# Patient Record
Sex: FEMALE | Race: WHITE | NOT HISPANIC OR LATINO | Employment: FULL TIME | URBAN - METROPOLITAN AREA
[De-identification: names, ages, dates, MRNs, and addresses within clinical notes are randomized per-mention and may not be internally consistent; named-entity substitution may affect disease eponyms.]

---

## 2017-01-24 ENCOUNTER — OFFICE VISIT (OUTPATIENT)
Dept: MEDICAL GROUP | Facility: MEDICAL CENTER | Age: 32
End: 2017-01-24
Payer: COMMERCIAL

## 2017-01-24 VITALS
WEIGHT: 153.66 LBS | BODY MASS INDEX: 21.51 KG/M2 | HEART RATE: 100 BPM | OXYGEN SATURATION: 98 % | DIASTOLIC BLOOD PRESSURE: 76 MMHG | SYSTOLIC BLOOD PRESSURE: 110 MMHG | HEIGHT: 71 IN | RESPIRATION RATE: 12 BRPM | TEMPERATURE: 98.2 F

## 2017-01-24 DIAGNOSIS — R19.5 CHANGE IN STOOL: ICD-10-CM

## 2017-01-24 PROCEDURE — 99214 OFFICE O/P EST MOD 30 MIN: CPT | Performed by: FAMILY MEDICINE

## 2017-01-24 RX ORDER — LACTOBACILLUS RHAMNOSUS GG 10B CELL
1 CAPSULE ORAL DAILY
Qty: 30 CAP | Refills: 2 | Status: SHIPPED | OUTPATIENT
Start: 2017-01-24 | End: 2018-08-14

## 2017-01-24 NOTE — PROGRESS NOTES
"Subjective:   Amelia Love is a 31 y.o. female here today for   Chief Complaint   Patient presents with   • Stool Color Change       1. Change in stool  This is a new problem. Patient complains of about 3 weeks of change in her stool pattern. She has had yellowish sticky stool. It started with several bouts of diarrhea. Now the stool is more formed and that it does follow to the top of the toilet bowl and his spouse smelling. She sees food particles that are undigested. She denies any abdominal pain, nausea, vomiting. Generally, she has one to 2 bowel movements per day. She does not feel constipated, however, she does have a sensation of incomplete defecation every time she uses the bathroom. She does eat a generally healthy diet. She does not remove her eating anything bad.    Current medicines (including changes today)  Current Outpatient Prescriptions   Medication Sig Dispense Refill   • Lactobacillus-Inulin ([facility] DIGESTIVE [facility]) Cap Take 1 Cap by mouth every day. 30 Cap 2   • levonorgestrel-ethinyl estradiol (AVIANE, ALESSE, LESSINA) 0.1-20 MG-MCG per tablet Take 1 Tab by mouth every day. 84 Tab 4     No current facility-administered medications for this visit.     She  has a past medical history of Seasonal allergic rhinitis (11/15/2013).    ROS   No chest pain, no shortness of breath, no abdominal pain       Objective:     Blood pressure 110/76, pulse 100, temperature 36.8 °C (98.2 °F), resp. rate 12, height 1.803 m (5' 10.98\"), weight 69.7 kg (153 lb 10.6 oz), SpO2 98 %. Body mass index is 21.44 kg/(m^2).   Physical Exam:  Constitutional: Alert, no distress.  Skin: Warm, dry, good turgor, no rashes in visible areas.  Eye: Equal, round and reactive, conjunctiva clear, lids normal.  ENMT: Lips without lesions, good dentition, oropharynx clear.  Neck: Trachea midline, no masses, no thyromegaly. No cervical or supraclavicular lymphadenopathy  Respiratory: Unlabored respiratory effort, lungs clear " to auscultation, no wheezes, no ronchi.  Cardiovascular: Normal S1, S2, no murmur, no edema.  Abdomen: Soft, non-tender, no masses, no hepatosplenomegaly.  Psych: Alert and oriented x3, normal affect and mood.        Assessment and Plan:   The following treatment plan was discussed    1. Change in stool  This is a new problem. Etiology at this point is unclear. I do suspect an acute gastroenteritis that has changed her gut danielito. Other differential diagnoses include celiac disease and irritable bowel, pancreatic insufficiency   Prescription for lactobacillus  Increased fiber intake  Return in 4 weeks or sooner if no improvement  - Lactobacillus-Inulin ([facility] DIGESTIVE [facility]) Cap; Take 1 Cap by mouth every day.  Dispense: 30 Cap; Refill: 2      Followup: Return in about 4 weeks (around 2/21/2017) for stool habits.

## 2017-01-24 NOTE — MR AVS SNAPSHOT
"        Amelia Love   2017 9:40 AM   Office Visit   MRN: [id number]    Department:  [place] [address]   Dept Phone:  [phone]    Description:  Female : 1985   Provider:  Glenny Ni M.D.           Reason for Visit     Stool Color Change           Allergies as of 2017     Allergen Noted Reactions    Minocycline 11/15/2013       Tetracycline 11/15/2013         You were diagnosed with     Change in stool   [[id number]]         Vital Signs     Blood Pressure Pulse Temperature Respirations Height Weight    110/76 mmHg 100 36.8 °C (98.2 °F) 12 1.803 m (5' 10.98\") 69.7 kg (153 lb 10.6 oz)    Body Mass Index Oxygen Saturation Smoking Status             21.44 kg/m2 98% Never Smoker          Basic Information     Date Of Birth Sex Race Ethnicity Preferred Language    1985 Female White Non- English      Your appointments     2017  9:00 AM   Established Patient with Glenny Ni M.D.   [facility])    [address] [facility] [address]  [facility] [id number] [phone]           You will be receiving a confirmation call a few days before your appointment from our automated call confirmation system.            2017 10:00 AM   Established Patient with Glenny Ni M.D.   [facility])    [address]  [facility] [id number] [phone]           You will be receiving a confirmation call a few days before your appointment from our automated call confirmation system.              Problem List              ICD-10-CM Priority Class Noted - Resolved    Abnormal uterine bleeding N93.9   2016 - Present    Multiple nevi D22.9   2016 - Present      Health Maintenance        Date Due Completion Dates    IMM DTaP/Tdap/Td Vaccine (1 - Tdap) 2004 ---    IMM INFLUENZA (1) 2016 ---    PAP SMEAR 2019, 11/15/2013            "   Current Immunizations     No immunizations on file.      Below and/or attached are the medications your provider expects you to take. Review all of your home medications and newly ordered medications with your provider and/or pharmacist. Follow medication instructions as directed by your provider and/or pharmacist. Please keep your medication list with you and share with your provider. Update the information when medications are discontinued, doses are changed, or new medications (including over-the-counter products) are added; and carry medication information at all times in the event of emergency situations     Allergies:  MINOCYCLINE - (reactions not documented)     TETRACYCLINE - (reactions not documented)               Medications  Valid as of: January 24, 2017 - 10:16 AM    Generic Name Brand Name Tablet Size Instructions for use    Lactobacillus-Inulin (Cap) CULTURELLE DIGESTIVE [facility]  Take 1 Cap by mouth every day.        Levonorgestrel-Ethinyl Estrad (Tab) AVIANE, ALEVENKATESH, LESSINA 0.1-20 MG-MCG Take 1 Tab by mouth every day.        .                 Medicines prescribed today were sent to:     [facility][id number] - [facility] [address] [facility]    [address] [address]    Phone: [phone] Fax: [phone]    Open 24 Hours?: No      Medication refill instructions:       If your prescription bottle indicates you have medication refills left, it is not necessary to call your provider’s office. Please contact your pharmacy and they will refill your medication.    If your prescription bottle indicates you do not have any refills left, you may request refills at any time through one of the following ways: The online Tixers system (except Urgent Care), by calling your provider’s office, or by asking your pharmacy to contact your provider’s office with a refill request. Medication refills are processed only during regular business hours and may not be available until the next business day. Your  provider may request additional information or to have a follow-up visit with you prior to refilling your medication.   *Please Note: Medication refills are assigned a new Rx number when refilled electronically. Your pharmacy may indicate that no refills were authorized even though a new prescription for the same medication is available at the pharmacy. Please request the medicine by name with the pharmacy before contacting your provider for a refill.           MyChart Access Code: Activation code not generated  Current Omnil[employer] Systemshart Status: Active

## 2017-08-11 ENCOUNTER — HOSPITAL ENCOUNTER (OUTPATIENT)
Facility: MEDICAL CENTER | Age: 32
End: 2017-08-11
Attending: FAMILY MEDICINE
Payer: COMMERCIAL

## 2017-08-11 ENCOUNTER — OFFICE VISIT (OUTPATIENT)
Dept: MEDICAL GROUP | Facility: LAB | Age: 32
End: 2017-08-11
Payer: COMMERCIAL

## 2017-08-11 VITALS
BODY MASS INDEX: 20.44 KG/M2 | OXYGEN SATURATION: 94 % | TEMPERATURE: 98.2 F | WEIGHT: 146 LBS | HEART RATE: 104 BPM | RESPIRATION RATE: 16 BRPM | SYSTOLIC BLOOD PRESSURE: 120 MMHG | DIASTOLIC BLOOD PRESSURE: 78 MMHG | HEIGHT: 71 IN

## 2017-08-11 DIAGNOSIS — Z01.419 WELL WOMAN EXAM WITH ROUTINE GYNECOLOGICAL EXAM: ICD-10-CM

## 2017-08-11 DIAGNOSIS — Z00.00 HEALTH MAINTENANCE EXAMINATION: ICD-10-CM

## 2017-08-11 DIAGNOSIS — Z30.8 ENCOUNTER FOR OTHER CONTRACEPTIVE MANAGEMENT: ICD-10-CM

## 2017-08-11 DIAGNOSIS — B07.9 VIRAL WARTS, UNSPECIFIED TYPE: ICD-10-CM

## 2017-08-11 DIAGNOSIS — Z11.51 SCREENING FOR HPV (HUMAN PAPILLOMAVIRUS): ICD-10-CM

## 2017-08-11 DIAGNOSIS — Z12.4 SCREENING FOR MALIGNANT NEOPLASM OF CERVIX: ICD-10-CM

## 2017-08-11 DIAGNOSIS — N93.9 ABNORMAL UTERINE BLEEDING: ICD-10-CM

## 2017-08-11 PROCEDURE — 88175 CYTOPATH C/V AUTO FLUID REDO: CPT

## 2017-08-11 PROCEDURE — 99213 OFFICE O/P EST LOW 20 MIN: CPT | Mod: 25 | Performed by: FAMILY MEDICINE

## 2017-08-11 PROCEDURE — 99000 SPECIMEN HANDLING OFFICE-LAB: CPT | Performed by: FAMILY MEDICINE

## 2017-08-11 PROCEDURE — 87624 HPV HI-RISK TYP POOLED RSLT: CPT

## 2017-08-11 PROCEDURE — 17110 DESTRUCTION B9 LES UP TO 14: CPT | Performed by: FAMILY MEDICINE

## 2017-08-11 RX ORDER — LEVONORGESTREL AND ETHINYL ESTRADIOL 0.1-0.02MG
1 KIT ORAL DAILY
Qty: 84 TAB | Refills: 4 | Status: SHIPPED | OUTPATIENT
Start: 2017-08-11 | End: 2017-12-07 | Stop reason: SDUPTHER

## 2017-08-11 ASSESSMENT — PATIENT HEALTH QUESTIONNAIRE - PHQ9: CLINICAL INTERPRETATION OF PHQ2 SCORE: 0

## 2017-08-11 NOTE — PROGRESS NOTES
SUBJECTIVE: 32 y.o.  female for annual routine gynecologic exam    1. Well woman exam with routine gynecological exam  2. Screening for malignant neoplasm of cervix  3. Screening for HPV (human papillomavirus)  4. Health maintenance examination    Obstetric History       T0      TAB0   SAB0   E0   M0   L0       Last Pap: 1 year ago   History   Sexual Activity   • Sexual Activity:   • Partners: Male   • Birth Control/ Protection: Pill     Sexual history: currently sexually active, single partner   H/O Abnormal Pap yes  She  reports that she has never smoked. She has never used smokeless tobacco.        Allergies: Minocycline and Tetracycline     ROS:      PREMENOPAUSAL  Menses every month with 7 days heavy bleeding   Cramping is moderate.       No significant bloating/fluid retention, pelvic pain, or dyspareunia. No vaginal discharge   No breast tenderness, mass, nipple discharge, changes in size or contour, or abnormal cyclic discomfort.  No urinary tract symptoms, no incontinence, no polydipsia, polyuria,  No abdominal pain, change in bowel habits, black or bloody stools.    No unusual headaches, no visual changes, menstrual migraines   No prolonged cough. No dyspnea or chest pain on exertion.  No depression, labile mood, anxiety, libido changes, insomnia.  No temperature intolerance.  No new/concerning skin lesions, concerns.     Exercise: sporadic irregular exercise    5. Abnormal uterine bleeding  This is chronic. Patient reports 1-1/2 years of heavy menstrual bleeding. She is having menses every month now that she is on birth control but there are clots and she is bleeding through tampons. There is no significant pain.    6. Viral warts, unspecified type  Nutrition discussed. Patient has a wart on her left hand. She has been trying over-the-counter medications which have not helped. It is not painful. No discharge.    7. Encounter for other contraceptive management  Currently on combined oral  "contraceptives. No side effects. She continues to have heavy menstrual bleeding.  No family or personal history of clotting disorders, PEs, or DVTs.  Patient does not smoke. She is not sedentary.           Current medicines (including changes today)  Current Outpatient Prescriptions   Medication Sig Dispense Refill   • levonorgestrel-ethinyl estradiol (AVIANE, ALESSE, LESSINA) 0.1-20 MG-MCG per tablet Take 1 Tab by mouth every day. 84 Tab 4   • Lactobacillus-Inulin ([facility] DIGESTIVE [facility]) Cap Take 1 Cap by mouth every day. 30 Cap 2     No current facility-administered medications for this visit.     She  has a past medical history of Seasonal allergic rhinitis (11/15/2013).  She  has past surgical history that includes dental extraction(s).     Family History:   Family History   Problem Relation Age of Onset   • Hypertension Father    • Cancer Maternal Grandmother 50     breast   • Alcohol/Drug Neg Hx    • Diabetes Neg Hx    • Heart Disease Neg Hx    • Psychiatry Neg Hx    • Stroke Neg Hx    • Thyroid Neg Hx    • Allergies Sister    • Hyperlipidemia Mother    • Other Mother 45     MS        OBJECTIVE:   /78 mmHg  Pulse 104  Temp(Src) 36.8 °C (98.2 °F)  Resp 16  Ht 1.803 m (5' 10.98\")  Wt 66.225 kg (146 lb)  BMI 20.37 kg/m2  SpO2 94%  Body mass index is 20.37 kg/(m^2).    HEENT: NC/AT, MMM, oropharynx clear  NECK:  No cervical or supraclavicular JULIETH  NEURO: Cranial nerves II-XII grossly intact  CARDIOVASCULAR:  Regular rate and rhythm.  S1 and S2 normal.  No edema  LUNGS: CTAB  ABDOMEN:  Soft without tenderness, guarding, mass or organomegaly.  No CVA tenderness or inguinal adenopathy.   EXTREMITIES:  peripheral pulses are 2+.   SKIN: color normal, vascularity normal, temperature normal. No rashes or suspicious skin lesions noted.  BREAST: Performed with instruction during examination. No axillary lymphadenopathy, no skin changes, no dominant masses. No nipple retraction  Pelvic Exam -  Normal " external genitalia with no lesions. Normal vaginal mucosa with normal rugation and scant discharge. Cervix with no visible lesions. No cervical motion tenderness. Uterus is normal sized with no masses. No adnexal tenderness or enlargement appreciated. Thin Prep Pap is obtained, vaginal swab is not obtained and specimen(s) sent to lab    <ASSESSMENT and PLAN>  1. Well woman exam with routine gynecological exam  2. Screening for malignant neoplasm of cervix  3. Screening for HPV (human papillomavirus)  Pap, breast exam done today. Healthcare maintenance up-to-date  - THINPREP PAP WITH HPV; Future    4. Health maintenance examination  Labs ordered  - LIPID PROFILE; Future  - COMP METABOLIC PANEL; Future  - CBC WITH DIFFERENTIAL; Future  - TSH WITH REFLEX TO FT4; Future    5. Abnormal uterine bleeding  Chronic, check labs, check transvaginal ultrasound if labs are normal  - TSH WITH REFLEX TO FT4; Future  - US-GYN-PELVIS TRANSVAGINAL; Future    6. Viral warts, unspecified type  CRYOTHERAPY:  Discussed risks and benefits of cryotherapy. Patient verbally agreed. 3 applications of cryotherapy were applied to wart lesion on left hand. Patient tolerated procedure well. Aftercare instructions given.    7. Encounter for other contraceptive management  Discussed OCPs at length. Instructed on importance of taking pill at the same time every day. Use back-up for up to 2 weeks. Discussed risks associated with OCPs including: blood clot, heart attack, and stroke. Advised not to smoke while on hormonal birth control.  - levonorgestrel-ethinyl estradiol (AVIANE, ALESSE, LESSINA) 0.1-20 MG-MCG per tablet; Take 1 Tab by mouth every day.  Dispense: 84 Tab; Refill: 4      Discussed  breast self exam, use and side effects of OCP's, family planning choices, adequate intake of calcium and vitamin D, diet and exercise   Follow-up in 3 years for next Pap if results are normal.   Next office visit for recheck of chronic medical conditions is  due in  1 year

## 2017-08-11 NOTE — MR AVS SNAPSHOT
"        Amelia Love   2017 10:00 AM   Office Visit   MRN: [id number]    Department:  [facility]   Dept Phone:  [phone]    Description:  Female : 1985   Provider:  Glenny Ni M.D.           Reason for Visit     Annual Exam           Allergies as of 2017     Allergen Noted Reactions    Minocycline 11/15/2013       Tetracycline 11/15/2013         You were diagnosed with     Well woman exam with routine gynecological exam   [099043]       Screening for malignant neoplasm of cervix   [113155]       Screening for HPV (human papillomavirus)   [617938]       Health maintenance examination   [583364]       Abnormal uterine bleeding   [472140]       Viral warts, unspecified type   [6087173]       Encounter for other contraceptive management   [0741454]         Vital Signs     Blood Pressure Pulse Temperature Respirations Height Weight    120/78 mmHg 104 36.8 °C (98.2 °F) 16 1.803 m (5' 10.98\") 66.225 kg (146 lb)    Body Mass Index Oxygen Saturation Smoking Status             20.37 kg/m2 94% Never Smoker          Basic Information     Date Of Birth Sex Race Ethnicity Preferred Language    1985 Female White Non- English      Your appointments     Aug 14, 2018  8:20 AM   ANNUAL EXAM PREVENTATIVE with Glenny Ni M.D.   [facility] (--)    [address] [id number] [phone]              Problem List              ICD-10-CM Priority Class Noted - Resolved    Abnormal uterine bleeding N93.9   2016 - Present    Multiple nevi D22.9   2016 - Present      Health Maintenance        Date Due Completion Dates    IMM DTaP/Tdap/Td Vaccine (1 - Tdap) 2004 ---    IMM INFLUENZA (1) 2017 ---    PAP SMEAR 2019, 11/15/2013            Current Immunizations     No immunizations on file.      Below and/or attached are the medications your provider expects you to take. Review all of your " home medications and newly ordered medications with your provider and/or pharmacist. Follow medication instructions as directed by your provider and/or pharmacist. Please keep your medication list with you and share with your provider. Update the information when medications are discontinued, doses are changed, or new medications (including over-the-counter products) are added; and carry medication information at all times in the event of emergency situations     Allergies:  MINOCYCLINE - (reactions not documented)     TETRACYCLINE - (reactions not documented)               Medications  Valid as of: August 11, 2017 - 10:38 AM    Generic Name Brand Name Tablet Size Instructions for use    Lactobacillus-Inulin (Cap) CULTURELLE DIGESTIVE [facility]  Take 1 Cap by mouth every day.        Levonorgestrel-Ethinyl Estrad (Tab) JOB MCBRIDE LESSINA 0.1-20 MG-MCG Take 1 Tab by mouth every day.        .                 Medicines prescribed today were sent to:     [facility]/PHARM[facility]Y #[id number] - [place] - [address]    [address] [facility] [address]    Phone: [phone] Fax: [phone]    Open 24 Hours?: No      Medication refill instructions:       If your prescription bottle indicates you have medication refills left, it is not necessary to call your provider’s office. Please contact your pharmacy and they will refill your medication.    If your prescription bottle indicates you do not have any refills left, you may request refills at any time through one of the following ways: The online [employer]at[employer] system (except Urgent Care), by calling your provider’s office, or by asking your pharmacy to contact your provider’s office with a refill request. Medication refills are processed only during regular business hours and may not be available until the next business day. Your provider may request additional information or to have a follow-up visit with you prior to refilling your medication.   *Please Note: Medication refills are  assigned a new Rx number when refilled electronically. Your pharmacy may indicate that no refills were authorized even though a new prescription for the same medication is available at the pharmacy. Please request the medicine by name with the pharmacy before contacting your provider for a refill.        Your To Do List     Future Labs/Procedures Complete By Expires    CBC WITH DIFFERENTIAL  As directed 8/12/2018    COMP METABOLIC PANEL  As directed 8/12/2018    LIPID PROFILE  As directed 8/12/2018    THINPREP PAP WITH HPV  As directed 8/12/2018    TSH WITH REFLEX TO FT4  As directed 8/11/2018    US-GYN-PELVIS TRANSVAGINAL  As directed 2/11/2018         Remarkhart Access Code: Activation code not generated  Current [email or web address] (D[employer]) Status: Active

## 2017-08-15 ENCOUNTER — HOSPITAL ENCOUNTER (OUTPATIENT)
Dept: LAB | Facility: MEDICAL CENTER | Age: 32
End: 2017-08-15
Attending: FAMILY MEDICINE
Payer: COMMERCIAL

## 2017-08-15 DIAGNOSIS — N93.9 ABNORMAL UTERINE BLEEDING: ICD-10-CM

## 2017-08-15 DIAGNOSIS — E03.8 SUBCLINICAL HYPOTHYROIDISM: ICD-10-CM

## 2017-08-15 DIAGNOSIS — Z00.00 HEALTH MAINTENANCE EXAMINATION: ICD-10-CM

## 2017-08-15 LAB
ALBUMIN SERPL BCP-MCNC: 4.1 G/DL (ref 3.2–4.9)
ALBUMIN/GLOB SERPL: 1.6 G/DL
ALP SERPL-CCNC: 51 U/L (ref 30–99)
ALT SERPL-CCNC: 12 U/L (ref 2–50)
ANION GAP SERPL CALC-SCNC: 7 MMOL/L (ref 0–11.9)
AST SERPL-CCNC: 12 U/L (ref 12–45)
BASOPHILS # BLD AUTO: 0.9 % (ref 0–1.8)
BASOPHILS # BLD: 0.05 K/UL (ref 0–0.12)
BILIRUB SERPL-MCNC: 0.4 MG/DL (ref 0.1–1.5)
BUN SERPL-MCNC: 9 MG/DL (ref 8–22)
CALCIUM SERPL-MCNC: 9.1 MG/DL (ref 8.5–10.5)
CHLORIDE SERPL-SCNC: 107 MMOL/L (ref 96–112)
CHOLEST SERPL-MCNC: 164 MG/DL (ref 100–199)
CO2 SERPL-SCNC: 26 MMOL/L (ref 20–33)
CREAT SERPL-MCNC: 0.78 MG/DL (ref 0.5–1.4)
CYTOLOGY REG CYTOL: NORMAL
EOSINOPHIL # BLD AUTO: 0.13 K/UL (ref 0–0.51)
EOSINOPHIL NFR BLD: 2.3 % (ref 0–6.9)
ERYTHROCYTE [DISTWIDTH] IN BLOOD BY AUTOMATED COUNT: 38.2 FL (ref 35.9–50)
GFR SERPL CREATININE-BSD FRML MDRD: >60 ML/MIN/1.73 M 2
GLOBULIN SER CALC-MCNC: 2.6 G/DL (ref 1.9–3.5)
GLUCOSE SERPL-MCNC: 83 MG/DL (ref 65–99)
HCT VFR BLD AUTO: 43.5 % (ref 37–47)
HDLC SERPL-MCNC: 52 MG/DL
HGB BLD-MCNC: 14.7 G/DL (ref 12–16)
HPV HR 12 DNA CVX QL NAA+PROBE: NEGATIVE
HPV16 DNA SPEC QL NAA+PROBE: NEGATIVE
HPV18 DNA SPEC QL NAA+PROBE: NEGATIVE
IMM GRANULOCYTES # BLD AUTO: 0.01 K/UL (ref 0–0.11)
IMM GRANULOCYTES NFR BLD AUTO: 0.2 % (ref 0–0.9)
LDLC SERPL CALC-MCNC: 94 MG/DL
LYMPHOCYTES # BLD AUTO: 1.53 K/UL (ref 1–4.8)
LYMPHOCYTES NFR BLD: 27 % (ref 22–41)
MCH RBC QN AUTO: 30.5 PG (ref 27–33)
MCHC RBC AUTO-ENTMCNC: 33.8 G/DL (ref 33.6–35)
MCV RBC AUTO: 90.2 FL (ref 81.4–97.8)
MONOCYTES # BLD AUTO: 0.35 K/UL (ref 0–0.85)
MONOCYTES NFR BLD AUTO: 6.2 % (ref 0–13.4)
NEUTROPHILS # BLD AUTO: 3.59 K/UL (ref 2–7.15)
NEUTROPHILS NFR BLD: 63.4 % (ref 44–72)
NRBC # BLD AUTO: 0 K/UL
NRBC BLD AUTO-RTO: 0 /100 WBC
PLATELET # BLD AUTO: 170 K/UL (ref 164–446)
PMV BLD AUTO: 13 FL (ref 9–12.9)
POTASSIUM SERPL-SCNC: 3.9 MMOL/L (ref 3.6–5.5)
PROT SERPL-MCNC: 6.7 G/DL (ref 6–8.2)
RBC # BLD AUTO: 4.82 M/UL (ref 4.2–5.4)
SODIUM SERPL-SCNC: 140 MMOL/L (ref 135–145)
SPECIMEN SOURCE: NORMAL
T4 FREE SERPL-MCNC: 1.09 NG/DL (ref 0.53–1.43)
TRIGL SERPL-MCNC: 89 MG/DL (ref 0–149)
TSH SERPL DL<=0.005 MIU/L-ACNC: 3.97 UIU/ML (ref 0.3–3.7)
WBC # BLD AUTO: 5.7 K/UL (ref 4.8–10.8)

## 2017-08-15 PROCEDURE — 85025 COMPLETE CBC W/AUTO DIFF WBC: CPT

## 2017-08-15 PROCEDURE — 80061 LIPID PANEL: CPT

## 2017-08-15 PROCEDURE — 80053 COMPREHEN METABOLIC PANEL: CPT

## 2017-08-15 PROCEDURE — 84439 ASSAY OF FREE THYROXINE: CPT

## 2017-08-15 PROCEDURE — 36415 COLL VENOUS BLD VENIPUNCTURE: CPT

## 2017-08-15 PROCEDURE — 84443 ASSAY THYROID STIM HORMONE: CPT

## 2017-08-15 NOTE — PROGRESS NOTES
Quick Note:    [email or web address] message sent to the patient    Glenny Ni M.D.    Subclinical hypothyroid    ______

## 2017-12-05 ENCOUNTER — APPOINTMENT (RX ONLY)
Dept: URBAN - METROPOLITAN AREA CLINIC 31 | Facility: CLINIC | Age: 32
Setting detail: DERMATOLOGY
End: 2017-12-05

## 2017-12-05 DIAGNOSIS — L82.1 OTHER SEBORRHEIC KERATOSIS: ICD-10-CM

## 2017-12-05 DIAGNOSIS — L91.0 HYPERTROPHIC SCAR: ICD-10-CM

## 2017-12-05 DIAGNOSIS — D18.0 HEMANGIOMA: ICD-10-CM

## 2017-12-05 DIAGNOSIS — D22 MELANOCYTIC NEVI: ICD-10-CM

## 2017-12-05 DIAGNOSIS — L81.4 OTHER MELANIN HYPERPIGMENTATION: ICD-10-CM

## 2017-12-05 DIAGNOSIS — L56.5 DISSEMINATED SUPERFICIAL ACTINIC POROKERATOSIS (DSAP): ICD-10-CM

## 2017-12-05 DIAGNOSIS — Z71.89 OTHER SPECIFIED COUNSELING: ICD-10-CM

## 2017-12-05 PROBLEM — D18.01 HEMANGIOMA OF SKIN AND SUBCUTANEOUS TISSUE: Status: ACTIVE | Noted: 2017-12-05

## 2017-12-05 PROBLEM — D22.72 MELANOCYTIC NEVI OF LEFT LOWER LIMB, INCLUDING HIP: Status: ACTIVE | Noted: 2017-12-05

## 2017-12-05 PROBLEM — Q82.8 OTHER SPECIFIED CONGENITAL MALFORMATIONS OF SKIN: Status: ACTIVE | Noted: 2017-12-05

## 2017-12-05 PROBLEM — D22.9 MELANOCYTIC NEVI, UNSPECIFIED: Status: ACTIVE | Noted: 2017-12-05

## 2017-12-05 PROBLEM — D22.5 MELANOCYTIC NEVI OF TRUNK: Status: ACTIVE | Noted: 2017-12-05

## 2017-12-05 PROCEDURE — 99395 PREV VISIT EST AGE 18-39: CPT

## 2017-12-05 PROCEDURE — ? COUNSELING

## 2017-12-05 PROCEDURE — ? OBSERVATION AND MEASURE

## 2017-12-05 PROCEDURE — ? ADDITIONAL NOTES

## 2017-12-05 PROCEDURE — ? PRESCRIPTION

## 2017-12-05 RX ORDER — POLYDIMETHYLSILOXANES/SILICON
GEL (GRAM) TOPICAL
Qty: 1 | Refills: 1 | Status: ERX

## 2017-12-05 ASSESSMENT — LOCATION ZONE DERM
LOCATION ZONE: LEG
LOCATION ZONE: FACE
LOCATION ZONE: TRUNK
LOCATION ZONE: TOE

## 2017-12-05 ASSESSMENT — LOCATION SIMPLE DESCRIPTION DERM
LOCATION SIMPLE: LEFT GREAT TOE
LOCATION SIMPLE: LEFT UPPER BACK
LOCATION SIMPLE: RIGHT UPPER BACK
LOCATION SIMPLE: RIGHT THIGH
LOCATION SIMPLE: ABDOMEN
LOCATION SIMPLE: LEFT THIGH
LOCATION SIMPLE: RIGHT PRETIBIAL REGION
LOCATION SIMPLE: RIGHT FOREHEAD
LOCATION SIMPLE: LEFT 4TH TOE

## 2017-12-05 ASSESSMENT — LOCATION DETAILED DESCRIPTION DERM
LOCATION DETAILED: RIGHT ANTERIOR PROXIMAL THIGH
LOCATION DETAILED: LEFT ANTERIOR PROXIMAL THIGH
LOCATION DETAILED: LEFT DORSAL 4TH TOE
LOCATION DETAILED: RIGHT SUPERIOR LATERAL FOREHEAD
LOCATION DETAILED: RIGHT DISTAL PRETIBIAL REGION
LOCATION DETAILED: RIGHT SUPERIOR MEDIAL UPPER BACK
LOCATION DETAILED: EPIGASTRIC SKIN
LOCATION DETAILED: LEFT MID-UPPER BACK
LOCATION DETAILED: RIGHT MEDIAL UPPER BACK
LOCATION DETAILED: LEFT DORSAL GREAT TOE

## 2017-12-05 NOTE — HPI: SCAR
Is This A New Presentation, Or A Follow-Up?: Scars
Additional History: Pt had biopsies last year and formed thicker scars, per pt tried otc topical and pads with no difference.  The scars are not tender or itching, just raised.

## 2017-12-05 NOTE — PROCEDURE: OBSERVATION
Size Of Lesion: 9 mm
Detail Level: Detailed
Size Of Lesion: 6 mm
Size Of Lesion: 3 mm
Size Of Lesion: 4 mm

## 2017-12-07 DIAGNOSIS — Z30.8 ENCOUNTER FOR OTHER CONTRACEPTIVE MANAGEMENT: ICD-10-CM

## 2017-12-07 RX ORDER — LEVONORGESTREL AND ETHINYL ESTRADIOL 0.1-0.02MG
1 KIT ORAL DAILY
Qty: 84 TAB | Refills: 4 | Status: SHIPPED | OUTPATIENT
Start: 2017-12-07 | End: 2018-01-04 | Stop reason: SDUPTHER

## 2018-01-04 DIAGNOSIS — Z30.8 ENCOUNTER FOR OTHER CONTRACEPTIVE MANAGEMENT: ICD-10-CM

## 2018-01-04 RX ORDER — LEVONORGESTREL AND ETHINYL ESTRADIOL 0.1-0.02MG
1 KIT ORAL DAILY
Qty: 84 TAB | Refills: 4 | Status: SHIPPED | OUTPATIENT
Start: 2018-01-04 | End: 2018-03-14

## 2018-02-05 ENCOUNTER — SLEEP CENTER VISIT (OUTPATIENT)
Dept: SLEEP MEDICINE | Facility: MEDICAL CENTER | Age: 33
End: 2018-02-05
Payer: COMMERCIAL

## 2018-02-05 VITALS
OXYGEN SATURATION: 96 % | WEIGHT: 160 LBS | SYSTOLIC BLOOD PRESSURE: 130 MMHG | BODY MASS INDEX: 22.9 KG/M2 | RESPIRATION RATE: 16 BRPM | HEART RATE: 70 BPM | HEIGHT: 70 IN | DIASTOLIC BLOOD PRESSURE: 80 MMHG

## 2018-02-05 DIAGNOSIS — G47.30 SLEEP DISORDER BREATHING: ICD-10-CM

## 2018-02-05 PROCEDURE — 99203 OFFICE O/P NEW LOW 30 MIN: CPT | Performed by: FAMILY MEDICINE

## 2018-02-05 RX ORDER — POLYDIMETHYLSILOXANES/SILICON
GEL (GRAM) TOPICAL
COMMUNITY
Start: 2017-12-06 | End: 2018-03-14

## 2018-02-05 NOTE — PROGRESS NOTES
"     [facility]  Consult Note     Date: 2/5/2018 / Time: 11:18 AM    Patient ID:   Name:             Amelia Love   YOB: 1985  Age:                 32 y.o.  female   MRN:               [id number]      Thank you for requesting a sleep medicine consultation on Amelia Love at the sleep center. she presents today with the chief complaints of snoring, gasping/choking occasional excessive daytime sleepiness (EDS). She is slef referred.     HISTORY OF PRESENT ILLNESS:       At night,  Amelia Love goes to bed around 8:30 pm on weekdays and around 10 pm on weekends. She gets out of bed at 4:30 on weekdays and at 8 am on weekends. She  averages 8 hrs of sleep on a good night Pt has bad nights rarely. She falls asleep within few minutes. She awakens occasionally. It takes her few min to fall back asleep.    She is aware of snoring/witnessed apneas/gasping or choking in sleep.  She  denies any symptoms of restless legs syndrome such as an \"urge to move\"  She  legs in the evening or bedtime. She  denies any symptoms of narcolepsy such as sleep paralysis or cataplexy, or any symptoms to suggest parasomnias such as sleep walking or acting out of dreams. She  has not used any medications for her sleep problem.    Overall,  She doesnot finds her sleep refreshing. When She  wakes up in the morning, She  does does feel tired. In terms of  excessive daytime sleepiness,  She complains of sleepiness while  at work, while reading or watching TV and occasionally while driving. She does take regular naps on the weekends. The naps are usually 1-2 min long.      REVIEW OF SYSTEMS:       Constitutional: Denies fevers, Denies weight changes  Eyes: Denies changes in vision, no eye pain  Ears/Nose/Throat/Mouth: Denies nasal congestion or sore throat   Cardiovascular: Denies chest pain or palpitations   Respiratory: Denies shortness of breath , Denies cough  Gastrointestinal/Hepatic: Denies abdominal " "pain, nausea, vomiting, diarrhea, constipation or GI bleeding   Genitourinary: Denies bladder dysfunction, dysuria or frequency  Musculoskeletal/Rheum: Denies  joint pain and swelling   Skin/Breast: Denies rash, denies breast lumps or discharge  Neurological: Denies headache, confusion, memory loss or focal weakness/parasthesias  Psychiatric: denies mood disorder     Comprehensive review of systems form is reviewed with the patient and is attached in the EMR.     PMH:  has a past medical history of Seasonal allergic rhinitis (11/15/2013).  MEDS:   Current Outpatient Prescriptions:   •  Scar Treatment Products (RECEDO) Gel, , Disp: , Rfl:   •  SRONYX 0.1-20 MG-MCG per tablet, TAKE 1 TAB BY MOUTH EVERY DAY., Disp: 84 Tab, Rfl: 4  •  Lactobacillus-Inulin ([employer] DIGESTIVE HEALTH) Cap, Take 1 Cap by mouth every day., Disp: 30 Cap, Rfl: 2  ALLERGIES:   Allergies   Allergen Reactions   • Minocycline    • Tetracycline      SURGHX:   Past Surgical History:   Procedure Laterality Date   • DENTAL EXTRACTION(S)       SOCHX:  reports that she has never smoked. She has never used smokeless tobacco. She reports that she drinks about 0.5 oz of alcohol per week . She reports that she does not use drugs.   FH:   Family History   Problem Relation Age of Onset   • Hypertension Father    • Hyperlipidemia Mother    • Other Mother 45     MS    • Cancer Maternal Grandmother 50     breast   • Allergies Sister    • Alcohol/Drug Neg Hx    • Diabetes Neg Hx    • Heart Disease Neg Hx    • Psychiatry Neg Hx    • Thyroid Neg Hx            Physical Exam:  Vitals/ General Appearance:   Weight/BMI: Body mass index is 22.96 kg/m².  Blood pressure 130/80, pulse 70, resp. rate 16, height 1.778 m (5' 10\"), weight 72.6 kg (160 lb), SpO2 96 %.  Vitals:    02/05/18 1056   BP: 130/80   Pulse: 70   Resp: 16   SpO2: 96%   Weight: 72.6 kg (160 lb)   Height: 1.778 m (5' 10\")       Pt. is alert and oriented to time, place and person. Cooperative and in no " apparent distress.       1. Head: Atraumatic, normocephalic. There is no mandibular hypoplasia or maxillary over-jut.   2. Ears: Normal tympanic membrane and no discharge  3. Nose: No inferior turbinate hypertophy, no septal deviation, no polyp.   4. Throat: Oropharynx appears crowded in that the palate is overhanging (Malam Destinee scale 4. Tonsils are not enlarged, uvula is large, pharynx not inflamed. Tongue is large. She has intact dentition and oral hygiene is fair.  5. Neck: Supple. No thyromegaly  6. Chest: Trachea central, no spine deformity (Scoliosis/Lordosis/Kyphosis)  7. Lungs auscultation: B/L good air entry, vesicular breath sounds, no adventitious sounds  8. Heart auscultation: 1st and 2nd heart sounds normal, regular rhythm. No appreciable murmur.  9. Abdomen: Soft, non tender, no organomegaly. Bowel sounds present  10. Extremities: No, no deformity, no clubbing, no pedal edema.   Peripheral pulses felt.  11. Skin: No rash  12. NEUROLOGICAL EXAMINATION: On neurological exam, the patient was alert and oriented x3. speech was clear and fluent without dysarthria.  Cranial nerve exam II through XII was normal. Motor exam revealed normal bulk, tone and strength 5/5, which was symmetrical in the upper and lower extremities bilaterally.  Deep tendon reflexes are 2+ and symmetric throughout.    INVESTIGATIONS:       ASSESSMENT AND PLAN     1. She  has symptoms of Obstructive Sleep Apnea (ENRRIQUE). She  has excessive daytime sleepiness that interferes with activites of daily living. She  risk factors for ENRRIQUE include crowded oropharynx.     The pathophysiology of ENRRIQUE and the increased risk of cardiovascular morbidity from untreated ENRRIQUE is discussed in detail with the patient.    We have discussed diagnostic options including in-laboratory, attended polysomnography and home sleep testing. We have also discussed treatment options including airway pressurization, reconstructive otolaryngologic surgery, dental  appliances and weight management.     She  will be scheduled for an overnight PSG to assess sleep related breathing disorder.     Subsequently,treatment options will be discussed based on the diagnostic study. Meanwhile, She is urged to avoid supine sleep, weight gain and alcoholic beverages since all of these can worsen ENRRIQUE. She is cautioned against drowsy driving. If She feels sleepy while driving, She must pull over for a break/nap, rather than persist on the road, in the interest of She own safety and that of others on the road.    2.  Regarding treatment of other past medical problems and general health maintenance,  She is urged to follow up with PCP.        Answers for HPI/ROS submitted by the patient on 1/22/2018   Year of your last physical exam: 2017  Results of exam: underactive thyroid  Occupation :   Height: 5'11''  Current weight: 155  6 months ago: 153  At age 20: 120  What is the reason for your visit today?: Snoring, possible sleep apnea?  Name of person referring you to the Sleep Center: No referral; but I discussed my visit with my primary care physician, Rupali Calvin  Have you ever been hospitalized?: No  Have you ever had problems with anesthesia?: No  Have you experienced post-operative delirium?: No  Any complications with surgery?: No  What year did you receive your last Flu shot?: None  What year did you receive you last Pneumonia shot?: None  Have you had a TB skin test? If so, please list the year and result:: No  Have you had Allergy skin testing? If so, please list the year and result:: Yes, 2014, negative  Please briefly describe your sleep problem and how old you were when it began.: I snore off and on throughout the night, sometimes very loudly. Sometimes I wake myself up gasping for air (possible apnea?). I have had these symptoms the last 2-3 years, but it has gotten worse the past 1 year to 6 months or so.  How does this affect your daily life and  activities? Please also rate how serious of a problem this is (1 = Not at all, 10 = Very Serious).: 4  Have you had any previous evaluations, examinations, or treatment for this sleep problem or any other problems with your sleep? If so, please describe the evaluation, treatment, and results.: No  Have you used any medications (prescribed or otherwise) to help your sleep problem? If yes, include name, amount, frequency, and the prescribing physician.: No  If employed, what time do you usually start and end work?: 6am-5pm  Do you ever change work shifts? If yes, describe how often (never, infrequently, regularly).: no  What time do you usually go to bed and wake up on: Weekdays? Weekends?: Weekdays: 8:30pm; Weekends: 10pm  Do you have a regular bed partner?: Yes  How many minutes does it usually take to fall asleep at night after turning off the lights?: Immediately; never any issues with falling alseep  What do you ordinarily do just prior to turning out the lights and attempting to go to sleep (e.g., reading, TV, baths, etc.)?: Usually tv/ipad and then brush teeth, wash face, etc.  On average, how many times do you wake up during the night?: 1 to 2; sometimes never that I know of  On average, how many times do you wake up to use the bathroom?: Occassionally I will get up before my alarm to use the bathroom, but it does not happen everynight. Maybe once a week at most.  Do you often wake up too early in the morning and are unable to return to sleep?: No  On average, how many hours of sleep do you get per night?: 8 to 10, sometimes 11 to 12 on weekends  How do you usually awaken?  Alarm, spontaneously, or other?: Alarm and/or my cat  Is it difficult for you to awaken and get out of bed after sleeping? (Not at all, Sometimes, Very): Sometimes  Do you nap or return to bed after arising?: Sometimes  Are you bothered by sleepiness during the day?: Yes  Do you feel that you get too much sleep at night?: No, but other  "people tell me I \"sleep too much\"  Do you feel that you get too little sleep at night?: Sometimes  Do you usually feel tired during the day? If so, what do you attribute this to?: Yes. I have always had low energy as long as I can remember  Do you find yourself falling asleep when you don't mean to? : Yes  If yes, how long does your sleep episode last?: Sometimes I just need 15-30 minute rest and that helps with my energy level  Do you feel rested or refreshed after the sleep episode?: Yes  Have you ever suddenly fallen?: No  Have you ever experienced sudden body weakness?: No  Have you ever experienced weakness or paralysis upon going to sleep?: Yes  Have you ever experienced weakness or paralysis upon awakening from sleep?: Yes  If yes for either of the above two questions, please indicate how many times per week does this occur?: Occassionally, I have not kept track. But I do recall sometimes not being able to move as in sleep but that my mind is somewhat awake as I fall in/out of sleep  Have you ever experienced seeing things or hearing voices/noises: That weren't real? On going to sleep? During the night? On awakening from sleep? During the day?: Yes. On going to sleep sounds are louder than they seem. For example, the other night I heard a loud bang as I was falling asleep and I lept out of bed but I could not find the source of the sound. This same occurence has happened to me multiple times.  Do you have difficulty breathing at night? If yes, briefly describe.: Yes; I sometimes wake up gasping for air  How many times per week?: I have not kept track, it seems to happen at least once a week  How did you become aware of this, at what age did this first occur, and how many years has this occurred?: I first remember this sensation a few years ago; probably 2013 or 2014?  Have you been told you snore while asleep? If so, does it disturb a bed partner (or someone in the same room), or someone in the next room?: " "Yes; yes it disturbs him significantly  Have you ever experienced doing something without being aware of the action? If yes, please describe.: I have been told by my partner I sometimes stick my arm straight up in the air when I am sleeping; I have no recollection of this.  How many times per week does this occur?: I'm not sure; occassionally  Have you ever experienced upon lying in bed before sleep or on awakening from sleep: Restlessness of legs, \"nervous legs\", \"creeping crawling\" sensation of legs, or twitching of legs?: No. But sometimes my extremeties, sometimes a foot or mostly my hands\"fall alseep\" because I am cutting of circulation due to sleep positions and I am jolted awake by the tingly sensation and try to get feeling back in them.  How many times per week does this occur, and how many minutes does the sensation last?: 2-3; 1-2 minutes or so until the blood returns to my extremeties  Does anything relieve the sensations (e.g., getting out of bed, medication, massage)?: Yes  At what age did this first occur, and how many years has this occurred?: I'm not sure, possibly the last 2 years?  Have you ever been told that your arms or legs jerk or twitch while you are asleep? If yes, how many times per night does this occur?: No  Do you know, or have you ever been told that you do any of the following while sleeping: talk, walk, grit teeth, wet the bed, wake up screaming or seemingly afraid, have disturbing dreams, have unusual movements, wake up with headaches, (males) have erections? If yes to any of these, please indicate how many times per week, age started, last occurrence, treatment received.: Yes, unusual movements such as placing my arm in the air; on occassion. The last time this happened was maybe 2 weeks ago.  Has anyone in your family been known to have any sleep problems? If yes, please list the type of problem (e.g., trouble getting to sleep, too sleepy, bed wetting, etc.), the relationship of " this person to you, and the treatment received.: No

## 2018-03-03 ENCOUNTER — SLEEP STUDY (OUTPATIENT)
Dept: SLEEP MEDICINE | Facility: MEDICAL CENTER | Age: 33
End: 2018-03-03
Attending: FAMILY MEDICINE
Payer: COMMERCIAL

## 2018-03-03 DIAGNOSIS — G47.30 SLEEP DISORDER BREATHING: ICD-10-CM

## 2018-03-03 PROCEDURE — 95810 POLYSOM 6/> YRS 4/> PARAM: CPT | Performed by: FAMILY MEDICINE

## 2018-03-05 NOTE — PROCEDURES
Clinical Comments:  The patient underwent a comprehensive polysomnogram using the standard montage for measurement of parameters of sleep, respiratory events, movement abnormalities, heart rate and rhythm. A microphone was used to monitor snoring.      INTERPRETATION:  The study was started at 9:14 PM.  The total recording time was 525.1 minutes with a sleep period of 511.1 minutes and the total sleep time was 384.5 minutes with a sleep efficiency of 73.2%.  The sleep latency was 14.0 minutes, and REM latency was 173.5 minutes.  The patient experienced 97 arousals in total, for an arousal index of 15.1    RESPIRATORY: The patient had 5 apneas in total.  Of these, 3 were obstructive apneas, and 2 were central apneas.  This resulted in an apnea index (AI) of 0.8.  The patient had 36 hypopneas, for a hypopnea index of 5.6.  The overall AHI was 6.4, while the AHI during Stage R sleep was 7.7.  AHI while supine was 12.1.    OXIMETRY: Oxygen saturation monitoring showed a mean SpO2 of 95.0%, with a minimum oxygen saturation of 91.0%.  Oxygen saturations were less than or = 89% for 0.0 minutes of sleep time.    CARDIAC: The highest heart rate during the recording was 110.0 beats per minute.  The average heart rate during sleep was 64.7 bpm.    LIMB MOVEMENTS: There were a total of 19 PLMs during sleep, of which 3 were PLMs arousals.  This resulted in a PLMS index of 3.0.    Technical summary:The patient underwent a diagnostic polysomnogram. This was a 16 channel montage study to include a 6 channel EEG, a 2 channel EOG, and chin EMG, left and right leg EMG, a snore channel, a nasal pressure transducer, and a nasal oral airflow semester.   Respiratory effort was assessed with the use of a thoracic and abdominal monitor and overnight oximetry was obtained. Audio and video recordings were reviewed. This was a fully attended study and sleep stage scoring was performed. The test was technically adequate.     General sleep  summary:  General sleep summary:  During the overnight study, the sleep latency was 14 min which is normal. The REM latency was 93 which is increased. The total sleep time was 384 min and sleep efficiency was 73% which is decreased.  Sleep stage proportions showed decreased REM, increased N# and increased WASO of 126 min.  In regards to sleep quality there was a mild degree of sleep fragmentation as shown by the arousal index of 15 an hour which is increased. The arousals were due to spontaneous arousal.    Respiratory summary: During the overnight study, the patient demonstrated mild obstructive sleep apnea as shown by the apnea hypopnea index of 6.4/hr. There was a total of 5 apneas and 36 hypopneas. In the supine position the respiratory disturbance index was 12.6 an hour and in the non-supine position the respiratory disturbance index was 4.3 per hour. The respiratory events were associated with O2 brad of 91% and averahe O2 saturation of 95%. There was mild snoring. The patient demonstrated a NSR with an average heartbeat of 65 bpm.     Periodic limb movement summary: The patient demonstrated an normal degree of periodic limb movements as shown by the PLM index of 0.2 per hour.      Impression:  1.  Mild obstructive sleep apnea      Recommendations:  Recommend the patient return for a CPAP titration vs Auto CPAP trial. In some cases alternative treatment options may prove effective in resolving sleep apnea and these options include upper airway surgery, the use of a dental orthotic or weight loss and positional therapy. Clinical correlation is required. In general patients with sleep apnea are advised to avoid alcohol and sedatives and to not operate a motor vehicle while drowsy and are at a greater risk for cardiovascular disease.

## 2018-03-14 ENCOUNTER — OFFICE VISIT (OUTPATIENT)
Dept: MEDICAL GROUP | Facility: PHYSICIAN GROUP | Age: 33
End: 2018-03-14
Payer: COMMERCIAL

## 2018-03-14 VITALS
TEMPERATURE: 98.6 F | BODY MASS INDEX: 22.62 KG/M2 | HEART RATE: 82 BPM | RESPIRATION RATE: 16 BRPM | HEIGHT: 70 IN | WEIGHT: 158 LBS | SYSTOLIC BLOOD PRESSURE: 118 MMHG | OXYGEN SATURATION: 97 % | DIASTOLIC BLOOD PRESSURE: 60 MMHG

## 2018-03-14 DIAGNOSIS — J01.40 ACUTE NON-RECURRENT PANSINUSITIS: ICD-10-CM

## 2018-03-14 PROCEDURE — 99214 OFFICE O/P EST MOD 30 MIN: CPT | Performed by: FAMILY MEDICINE

## 2018-03-14 RX ORDER — AZITHROMYCIN 250 MG/1
TABLET, FILM COATED ORAL
Qty: 6 TAB | Refills: 0 | Status: SHIPPED | OUTPATIENT
Start: 2018-03-14 | End: 2018-08-14

## 2018-03-14 ASSESSMENT — PAIN SCALES - GENERAL: PAINLEVEL: NO PAIN

## 2018-03-14 NOTE — PATIENT INSTRUCTIONS
Sinus Rinse  WHAT IS A SINUS RINSE?  A sinus rinse is a home treatment. It rinses your sinuses with a mixture of salt and water (saline solution). Sinuses are air-filled spaces in your skull behind the bones of your face and forehead. They open into your nasal cavity.  To do a sinus rinse, you will need:  · Saline solution.  · Neti pot or spray bottle. This releases the saline solution into your nose and through your sinuses. You can buy neti pots and spray bottles at:  ¨ Your local pharmacy.  ¨ A [employer] food store.  ¨ Online.  WHEN WOULD I DO A SINUS RINSE?  A sinus rinse can help to clear your nasal cavity. It can clear:  · Mucus.  · Dirt.  · Dust.  · Pollen.  You may do a sinus rinse when you have:  · A cold.  · A virus.  · Allergies.  · A sinus infection.  · A stuffy nose.  If you are considering a sinus rinse:  · Ask your child's doctor before doing a sinus rinse on your child.  · Do not do a sinus rinse if you have had:  ¨ Ear or nasal surgery.  ¨ An ear infection.  ¨ Blocked ears.  HOW DO I DO A SINUS RINSE?  · Wash your hands.  · Disinfect your device using the directions that came with the device.  · Dry your device.  · Use the solution that comes with your device or one that is sold separately in stores. Follow the mixing directions on the package.  · Fill your device with the amount of saline solution as stated in the device instructions.  · Stand over a sink and tilt your head sideways over the sink.  · Place the spout of the device in your upper nostril (the one closer to the ceiling).  · Gently pour or squeeze the saline solution into the nasal cavity. The liquid should drain to the lower nostril if you are not too congested.  · Gently blow your nose. Blowing too hard may cause ear pain.  · Repeat in the other nostril.  · Clean and rinse your device with clean water.  · Air-dry your device.  ARE THERE RISKS OF A SINUS RINSE?  Sinus rinse is normally very safe and helpful. However, there are a few  risks, which include:  · A burning feeling in the sinuses. This may happen if you do not make the saline solution as instructed. Make sure to follow all directions when making the saline solution.  · Infection from unclean water. This is rare, but possible.  · Nasal irritation.  This information is not intended to replace advice given to you by your health care provider. Make sure you discuss any questions you have with your health care provider.  Document Released: 07/15/2015 Document Revised: 08/13/2017 Document Reviewed: 05/05/2015  Elsevier Interactive Patient Education © 2017 [employer]

## 2018-03-14 NOTE — PROGRESS NOTES
"Chief Complaint   Patient presents with   • Sinusitis     poss sinus infection        HPI: 10+ days of illness including: nasal congestion, rhinorrhea, facial pain, ear pain. Mucus is: green.  Symptoms negative for fever, chills, chest pain, dyspnea, wheezing.   Similarly ill exposures: yes.  Medical history negative for recurrent OM, tonsillitis, sinusitis, asthma, bronchitis, pneumonia.   She  reports that she has never smoked. She has never used smokeless tobacco.     Patient made her appointment today, because she will be traveling to [place] for a couple of weeks to visit her sister. She wanted to make sure that she was well enough to travel.    ROS:  No fever, cough, nausea, changes in bowel movements or skin rash.    I reviewed the patient's medical history:  Past Medical History:   Diagnosis Date   • Seasonal allergic rhinitis 11/15/2013       EXAM:  Blood pressure 118/60, pulse 82, temperature 37 °C (98.6 °F), resp. rate 16, height 1.778 m (5' 10\"), weight 71.7 kg (158 lb), last menstrual period 03/11/2018, SpO2 97 %, not currently breastfeeding.  General: Alert, no conversational dyspnea or audible wheeze, non-toxic appearance.  Eyes: PERRL, conjunctiva slightly injected, no eye discharge.  Ears: Normal pinnae,TM's normal bilaterally.  Nares: Patent with thin mucus.  Sinuses: Non tender over maxillary and frontal sinuses.  Throat: Erythematous injection without exudate.   Neck: Supple, with shotty anterior cervical lymphadenopathy.  Lungs: Clear to auscultation bilaterally, no wheeze, crackles or rhonchi.   Heart: Regular rate without murmur.  Skin: Warm and dry without rash.    ASSESSMENT: Acute pansinusitis    PLAN:  1. Educated patient that majority of sinus infections are viral and do not need antibiotics. Provided her with a prescription for azithromycin to take with her as she will be traveling out of the country. Patient reports she has taken azithromycin in the past for sinus infections.  2. Twice " daily use of nasal saline rinse or Neti-Pot.  3. OTC anti-pyretics and decongestants as needed.  4. Follow-up for worsening symptoms, difficulty breathing, lack of expected recovery, or should new symptoms or problems arise.

## 2018-05-02 ENCOUNTER — SLEEP CENTER VISIT (OUTPATIENT)
Dept: SLEEP MEDICINE | Facility: MEDICAL CENTER | Age: 33
End: 2018-05-02
Payer: COMMERCIAL

## 2018-05-02 VITALS
RESPIRATION RATE: 15 BRPM | SYSTOLIC BLOOD PRESSURE: 118 MMHG | BODY MASS INDEX: 22.62 KG/M2 | OXYGEN SATURATION: 96 % | WEIGHT: 158 LBS | DIASTOLIC BLOOD PRESSURE: 82 MMHG | HEART RATE: 80 BPM | HEIGHT: 70 IN

## 2018-05-02 DIAGNOSIS — G47.33 OSA (OBSTRUCTIVE SLEEP APNEA): ICD-10-CM

## 2018-05-02 PROCEDURE — 99213 OFFICE O/P EST LOW 20 MIN: CPT | Performed by: FAMILY MEDICINE

## 2018-05-02 NOTE — PROGRESS NOTES
[facility] Follow Up Note     Date: 5/2/2018 / Time: 11:10 AM    Patient ID:   Name:             Amelia Love   YOB: 1985  Age:                 32 y.o.  female   MRN:               [id number]      Thank you for requesting a sleep medicine consultation on Amelia Love at the sleep center. She presents today with the chief complaints of ENRRIQUE follow up.     HISTORY OF PRESENT ILLNESS:       Pt is currently not on PAP therapy. No change in sleep schedule. She goes to sleep around 8:30 pm on weekdays and around 10 pm on weekends. She gets out of bed at 4:30 on weekdays and at 8 am on weekends. She  averages 8 hrs of sleep on a good night Pt has bad nights rarelyThe symptoms of excessive daytime, snoring  Persist    She had PSG on 03/03/18 it was mild obstructive sleep apnea as shown by the apnea hypopnea index of 6.4/hr. There was a total of 5 apneas and 36 hypopneas. In the supine position the respiratory disturbance index was 12.6 an hour and in the non-supine position the respiratory disturbance index was 4.3 per hour. The respiratory events were associated with O2 brad of 91% and averahe O2 saturation of 95%.            REVIEW OF SYSTEMS:       Constitutional: Denies fevers, Denies weight changes  Eyes: Denies changes in vision, no eye pain  Ears/Nose/Throat/Mouth: Denies nasal congestion or sore throat   Cardiovascular: Denies chest pain or palpitations   Respiratory: Denies shortness of breath , Denies cough  Gastrointestinal/Hepatic: Denies abdominal pain, nausea, vomiting, diarrhea, constipation or GI bleeding   Genitourinary: Denies bladder dysfunction, dysuria or frequency  Musculoskeletal/Rheum: Denies  joint pain and swelling   Skin/Breast: Denies rash,   Neurological: Denies headache, confusion, memory loss or focal weakness/parasthesias  Psychiatric: denies mood disorder     Comprehensive review of systems form is reviewed with the patient and is attached in the  "EMR.     PMH:  has a past medical history of Seasonal allergic rhinitis (11/15/2013).  MEDS:   Current Outpatient Prescriptions:   •  azithromycin (ZITHROMAX) 250 MG Tab, Take 2 tablets PO on day #1, then 1 tablet PO daily on days #2-5., Disp: 6 Tab, Rfl: 0  •  Lactobacillus-Inulin ([facility] DIGESTIVE [facility]) Cap, Take 1 Cap by mouth every day., Disp: 30 Cap, Rfl: 2  ALLERGIES:   Allergies   Allergen Reactions   • Minocycline    • Tetracycline      SURGHX:   Past Surgical History:   Procedure Laterality Date   • DENTAL EXTRACTION(S)       SOCHX:  reports that she has never smoked. She has never used smokeless tobacco. She reports that she drinks about 0.5 oz of alcohol per week . She reports that she does not use drugs..  FH:   Family History   Problem Relation Age of Onset   • Hypertension Father    • Hyperlipidemia Mother    • Other Mother 45     MS    • Cancer Maternal Grandmother 50     breast   • Allergies Sister    • Alcohol/Drug Neg Hx    • Diabetes Neg Hx    • Heart Disease Neg Hx    • Psychiatry Neg Hx    • Thyroid Neg Hx          Physical Exam:  Vitals/ General Appearance:   Weight/BMI: Body mass index is 22.67 kg/m².  Blood pressure 118/82, pulse 80, resp. rate 15, height 1.778 m (5' 10\"), weight 71.7 kg (158 lb), SpO2 96 %.  Vitals:    05/02/18 1047   BP: 118/82   Pulse: 80   Resp: 15   SpO2: 96%   Weight: 71.7 kg (158 lb)   Height: 1.778 m (5' 10\")       Pt. is alert and oriented to time, place and person. Cooperative and in no apparent distress.       1. Head: Atraumatic, normocephalic.   2. Ears: Normal tympanic membrane and no discharge  3. Nose: No inferior turbinate hypertophy, no septal deviation, no polyp.   4. Throat: Oropharynx appears crowded in that the palate is overhanging (Malam Destinee scale 4. Tonsils are not enlarged, uvula is large  5. Neck: Supple. No thyromegaly  6. Chest: Trachea central  7. Lungs auscultation: B/L good air entry, vesicular breath sounds, no adventitious sounds  8. " Heart auscultation: 1st and 2nd heart sounds normal, regular rhythm. No appreciable murmur.  9. Abdomen: Soft, non tender, no organomegaly. Bowel sounds present  10. Extremities:  no pedal edema.   Peripheral pulses felt.  11. Skin: No rash  12. NEUROLOGICAL EXAMINATION: On neurological exam, the patient was alert and oriented x3.      INVESTIGATIONS:           ASSESSMENT AND PLAN     1.Obstructive Sleep Apnea (ENRRIQUE).The symptoms of excessive daytime, snoring has continued      The pathophysiology of ENRRIQUE and the increased risk of cardiovascular morbidity from untreated ENRRIQUE is discussed in detail with the patient.      She is urged to avoid supine sleep, weight gain and alcoholic beverages since all of these can worsen ENRRIQUE. She is cautioned against drowsy driving. If She feels sleepy while driving, She must pull over for a break/nap, rather than persist on the road, in the interest of She own safety and that of others on the road.   Plan   - Auto CPAP vs overnight CPAP titration vs OAT. After informed discussion she will try OAT       2. . Regarding treatment of other past medical problems and general health maintenance,  She is urged to follow up with PCP.

## 2018-05-03 DIAGNOSIS — G47.33 OSA (OBSTRUCTIVE SLEEP APNEA): ICD-10-CM

## 2018-06-12 ENCOUNTER — APPOINTMENT (RX ONLY)
Dept: URBAN - METROPOLITAN AREA CLINIC 31 | Facility: CLINIC | Age: 33
Setting detail: DERMATOLOGY
End: 2018-06-12

## 2018-06-12 DIAGNOSIS — D22 MELANOCYTIC NEVI: ICD-10-CM

## 2018-06-12 PROBLEM — D48.5 NEOPLASM OF UNCERTAIN BEHAVIOR OF SKIN: Status: ACTIVE | Noted: 2018-06-12

## 2018-06-12 PROCEDURE — 11100: CPT

## 2018-06-12 PROCEDURE — ? BIOPSY BY SHAVE METHOD

## 2018-06-12 ASSESSMENT — LOCATION DETAILED DESCRIPTION DERM: LOCATION DETAILED: LEFT HYPOTHENAR EMINENCE

## 2018-06-12 ASSESSMENT — LOCATION ZONE DERM: LOCATION ZONE: HAND

## 2018-06-12 ASSESSMENT — LOCATION SIMPLE DESCRIPTION DERM: LOCATION SIMPLE: LEFT HAND

## 2018-06-12 NOTE — PROCEDURE: BIOPSY BY SHAVE METHOD
Additional Anesthesia Volume In Cc (Will Not Render If 0): 0
Anesthesia Volume In Cc: 0.5
Consent: Written consent was obtained and risks were reviewed including but not limited to scarring, infection, bleeding, scabbing, incomplete removal, nerve damage and allergy to anesthesia.
Bill For Surgical Tray: no
Silver Nitrate Text: The wound bed was treated with silver nitrate after the biopsy was performed.
Curettage Text: The wound bed was treated with curettage after the biopsy was performed.
Size Of Lesion In Cm: 0.4
Post-Care Instructions: I reviewed with the patient in detail post-care instructions. Patient is to keep the biopsy site dry overnight, and then apply vaseline twice daily until healed.
Biopsy Type: H and E
Was A Bandage Applied: Yes
Electrodesiccation Text: The wound bed was treated with electrodesiccation after the biopsy was performed.
Wound Care: Vaseline
Cryotherapy Text: The wound bed was treated with cryotherapy after the biopsy was performed.
Dressing: bandage
Detail Level: Detailed
Hemostasis: Drysol and Electrocautery
Lab Facility: 
Anesthesia Type: 1% lidocaine with epinephrine
Billing Type: Third-Party Bill
Type Of Destruction Used: Curettage
Lab: [id number]
Biopsy Method: Personna blade
Notification Instructions: Patient will be notified of biopsy results. However, patient instructed to call the office if not contacted within 2 weeks.
Electrodesiccation And Curettage Text: The wound bed was treated with electrodesiccation and curettage after the biopsy was performed.

## 2018-06-12 NOTE — HPI: SKIN LESION
Is This A New Presentation, Or A Follow-Up?: Mole
How Severe Is Your Skin Lesion?: mild
Has Your Skin Lesion Been Treated?: not been treated
Additional History: Pt states she has had mole for years and has been itchy in the last month.

## 2018-06-25 ENCOUNTER — HOSPITAL ENCOUNTER (OUTPATIENT)
Dept: LAB | Facility: MEDICAL CENTER | Age: 33
End: 2018-06-25
Attending: FAMILY MEDICINE
Payer: COMMERCIAL

## 2018-06-25 DIAGNOSIS — E03.8 SUBCLINICAL HYPOTHYROIDISM: ICD-10-CM

## 2018-06-25 LAB
T4 FREE SERPL-MCNC: 1.04 NG/DL (ref 0.53–1.43)
TSH SERPL DL<=0.005 MIU/L-ACNC: 2.87 UIU/ML (ref 0.38–5.33)

## 2018-06-25 PROCEDURE — 84439 ASSAY OF FREE THYROXINE: CPT

## 2018-06-25 PROCEDURE — 36415 COLL VENOUS BLD VENIPUNCTURE: CPT

## 2018-06-25 PROCEDURE — 84443 ASSAY THYROID STIM HORMONE: CPT

## 2018-08-14 ENCOUNTER — OFFICE VISIT (OUTPATIENT)
Dept: MEDICAL GROUP | Facility: LAB | Age: 33
End: 2018-08-14
Payer: COMMERCIAL

## 2018-08-14 VITALS
DIASTOLIC BLOOD PRESSURE: 62 MMHG | WEIGHT: 158 LBS | SYSTOLIC BLOOD PRESSURE: 104 MMHG | RESPIRATION RATE: 12 BRPM | HEART RATE: 92 BPM | TEMPERATURE: 97.7 F | HEIGHT: 70 IN | BODY MASS INDEX: 22.62 KG/M2 | OXYGEN SATURATION: 99 %

## 2018-08-14 DIAGNOSIS — G47.33 OSA (OBSTRUCTIVE SLEEP APNEA): ICD-10-CM

## 2018-08-14 DIAGNOSIS — Z83.49 FAMILY HISTORY OF HYPOTHYROIDISM: ICD-10-CM

## 2018-08-14 DIAGNOSIS — Z00.00 HEALTH MAINTENANCE EXAMINATION: ICD-10-CM

## 2018-08-14 DIAGNOSIS — E03.8 SUBCLINICAL HYPOTHYROIDISM: ICD-10-CM

## 2018-08-14 DIAGNOSIS — Z30.41 ENCOUNTER FOR SURVEILLANCE OF CONTRACEPTIVE PILLS: ICD-10-CM

## 2018-08-14 PROCEDURE — 99395 PREV VISIT EST AGE 18-39: CPT | Performed by: FAMILY MEDICINE

## 2018-08-14 RX ORDER — LEVONORGESTREL AND ETHINYL ESTRADIOL 0.1-0.02MG
.1-.2 KIT ORAL DAILY
COMMUNITY
Start: 2018-08-05 | End: 2018-08-14 | Stop reason: SDUPTHER

## 2018-08-14 RX ORDER — LEVONORGESTREL AND ETHINYL ESTRADIOL 0.1-0.02MG
1 KIT ORAL DAILY
Qty: 84 TAB | Refills: 4 | Status: SHIPPED | OUTPATIENT
Start: 2018-08-14 | End: 2019-03-13

## 2018-08-14 ASSESSMENT — PATIENT HEALTH QUESTIONNAIRE - PHQ9: CLINICAL INTERPRETATION OF PHQ2 SCORE: 0

## 2018-08-14 NOTE — PROGRESS NOTES
"Subjective:   Amelia Love is a 33 y.o. female here today for   Chief Complaint   Patient presents with   • Annual Exam       1. Health maintenance examination  Pap: Last done 2017, up to date  STD screen: monogamous   Tetanus booster: Last done 2011 patient report  Pneumonia vaccine: Not indicated  Shingles vaccine: Not done   Flu vaccine: Declines  Diet: healthy  Exercise: Regular    2. ENRRIQUE (obstructive sleep apnea)  This is a chronic problem.  Patient had mild sleep apnea on her sleep study.  She is working to try to get a dental appliance.  They are waiting on prior authorization through her insurance.  She also met with ENT to discuss surgery.  She does have a deviated septum and they did discuss doing a septoplasty as well as nasal turbinate conchae trimming.  Right now, she plans to wait to see if the dental appliance helps.  She will follow-up with Dr. Winter next month    3. Subclinical hypothyroidism  This is chronic.  Repeat labs are normal.  She is not having any diarrhea, constipation, weight gain, weight loss, tremor, mood changes, hair changes, skin changes.  She did find out that there is a strong family history of hypothyroidism in her sister, aunt, and maternal grandmother      Current medicines (including changes today)  Current Outpatient Prescriptions   Medication Sig Dispense Refill   • levonorgestrel-ethinyl estradiol (AVIANE, ALESSE, LESSINA) 0.1-20 MG-MCG per tablet Take 1 Tab by mouth every day. 84 Tab 4     No current facility-administered medications for this visit.      She  has a past medical history of Seasonal allergic rhinitis (11/15/2013).    ROS   No fevers  No bowel changes  No LE edema       Objective:     Blood pressure 104/62, pulse 92, temperature 36.5 °C (97.7 °F), resp. rate 12, height 1.778 m (5' 10\"), weight 71.7 kg (158 lb), SpO2 99 %. Body mass index is 22.67 kg/m².   Physical Exam:  Constitutional: Alert, no distress.  Skin: Warm, dry, good turgor, no rashes in " visible areas.  Eye: Equal, round and reactive, conjunctiva clear, lids normal.  ENMT: Lips without lesions, good dentition, oropharynx clear.  Neck: Trachea midline, no masses, no thyromegaly. No cervical or supraclavicular lymphadenopathy  Respiratory: Unlabored respiratory effort, lungs clear to auscultation, no wheezes, no ronchi.  Cardiovascular: Normal S1, S2, RRR, no murmur, no edema.  Abdomen: Soft, non-tender, no masses, no hepatosplenomegaly.  Psych: Alert and oriented x3, normal affect and mood.      Assessment and Plan:   The following treatment plan was discussed    1. Health maintenance examination  Age-appropriate counseling given, labs ordered for her healthy tracts at work.  Discussed diet, exercise, sunscreen.  Follows with dermatology regularly  - LIPID PROFILE; Future  - COMP METABOLIC PANEL; Future  - CBC WITH DIFFERENTIAL; Future    2. ENRRIQUE (obstructive sleep apnea)  Chronic, currently stable and mild.  She is working on getting a dental appliance.  She will have a repeat evaluation after this is approved.  She also met with ENT to evaluate for surgery and they did recommend septoplasty.  She is holding off on this currently  - LIPID PROFILE; Future  - COMP METABOLIC PANEL; Future  - CBC WITH DIFFERENTIAL; Future    3. Subclinical hypothyroidism  Chronic, stable, repeat labs are normal.  We will recheck annually given her strong family history of hypothyroidism  - LIPID PROFILE; Future  - COMP METABOLIC PANEL; Future  - CBC WITH DIFFERENTIAL; Future    4. Encounter for surveillance of contraceptive pills  Chronic, stable without side effects.  Does not miss doses.  Last menstrual period was in 1 month ago.  Discussed OCPs at length. Instructed on importance of taking pill at the same time every day. Use back-up for up to 2 weeks. Discussed risks associated with OCPs including: blood clot, heart attack, and stroke. Advised not to smoke while on hormonal birth control.  - levonorgestrel-ethinyl  estradiol (AVIANE, ALESSE, LESSINA) 0.1-20 MG-MCG per tablet; Take 1 Tab by mouth every day.  Dispense: 84 Tab; Refill: 4  - LIPID PROFILE; Future  - COMP METABOLIC PANEL; Future  - CBC WITH DIFFERENTIAL; Future    5. Family history of hypothyroidism  New, recheck thyroid labs annually or sooner for any symptoms        Followup: Return in about 1 year (around 8/14/2019), or if symptoms worsen or fail to improve, for Annual.       This note was created using voice recognition software. I have made every reasonable attempt to correct errors, however, I do anticipate some grammatical errors.

## 2018-09-12 ENCOUNTER — APPOINTMENT (OUTPATIENT)
Dept: SLEEP MEDICINE | Facility: MEDICAL CENTER | Age: 33
End: 2018-09-12
Payer: COMMERCIAL

## 2018-12-06 ENCOUNTER — APPOINTMENT (RX ONLY)
Dept: URBAN - METROPOLITAN AREA CLINIC 31 | Facility: CLINIC | Age: 33
Setting detail: DERMATOLOGY
End: 2018-12-06

## 2018-12-06 DIAGNOSIS — L82.1 OTHER SEBORRHEIC KERATOSIS: ICD-10-CM

## 2018-12-06 DIAGNOSIS — L81.4 OTHER MELANIN HYPERPIGMENTATION: ICD-10-CM

## 2018-12-06 DIAGNOSIS — Q828 OTHER SPECIFIED ANOMALIES OF SKIN: ICD-10-CM

## 2018-12-06 DIAGNOSIS — Z71.89 OTHER SPECIFIED COUNSELING: ICD-10-CM

## 2018-12-06 DIAGNOSIS — L56.5 DISSEMINATED SUPERFICIAL ACTINIC POROKERATOSIS (DSAP): ICD-10-CM | Status: RESOLVED

## 2018-12-06 DIAGNOSIS — Q819 OTHER SPECIFIED ANOMALIES OF SKIN: ICD-10-CM

## 2018-12-06 DIAGNOSIS — D22 MELANOCYTIC NEVI: ICD-10-CM

## 2018-12-06 DIAGNOSIS — Q826 OTHER SPECIFIED ANOMALIES OF SKIN: ICD-10-CM

## 2018-12-06 DIAGNOSIS — D18.0 HEMANGIOMA: ICD-10-CM

## 2018-12-06 DIAGNOSIS — L91.0 HYPERTROPHIC SCAR: ICD-10-CM

## 2018-12-06 PROBLEM — D23.62 OTHER BENIGN NEOPLASM OF SKIN OF LEFT UPPER LIMB, INCLUDING SHOULDER: Status: ACTIVE | Noted: 2018-12-06

## 2018-12-06 PROBLEM — D18.01 HEMANGIOMA OF SKIN AND SUBCUTANEOUS TISSUE: Status: ACTIVE | Noted: 2018-12-06

## 2018-12-06 PROBLEM — D22.72 MELANOCYTIC NEVI OF LEFT LOWER LIMB, INCLUDING HIP: Status: ACTIVE | Noted: 2018-12-06

## 2018-12-06 PROBLEM — Q82.8 OTHER SPECIFIED CONGENITAL MALFORMATIONS OF SKIN: Status: ACTIVE | Noted: 2018-12-06

## 2018-12-06 PROBLEM — D22.5 MELANOCYTIC NEVI OF TRUNK: Status: ACTIVE | Noted: 2018-12-06

## 2018-12-06 PROCEDURE — 99395 PREV VISIT EST AGE 18-39: CPT

## 2018-12-06 PROCEDURE — ? ADDITIONAL NOTES

## 2018-12-06 PROCEDURE — ? OBSERVATION AND MEASURE

## 2018-12-06 PROCEDURE — ? COUNSELING

## 2018-12-06 PROCEDURE — ? PRESCRIPTION

## 2018-12-06 RX ORDER — POLYDIMETHYLSILOXANES/SILICON
GEL (GRAM) TOPICAL
Qty: 1 | Refills: 1 | Status: ERX

## 2018-12-06 ASSESSMENT — LOCATION SIMPLE DESCRIPTION DERM
LOCATION SIMPLE: LEFT 4TH TOE
LOCATION SIMPLE: LOWER BACK
LOCATION SIMPLE: RIGHT BREAST
LOCATION SIMPLE: ABDOMEN
LOCATION SIMPLE: LEFT GREAT TOE
LOCATION SIMPLE: LEFT POSTERIOR UPPER ARM
LOCATION SIMPLE: RIGHT UPPER BACK
LOCATION SIMPLE: LEFT THIGH
LOCATION SIMPLE: LEFT UPPER BACK
LOCATION SIMPLE: RIGHT POSTERIOR UPPER ARM
LOCATION SIMPLE: RIGHT THIGH

## 2018-12-06 ASSESSMENT — LOCATION ZONE DERM
LOCATION ZONE: ARM
LOCATION ZONE: LEG
LOCATION ZONE: TRUNK
LOCATION ZONE: TOE

## 2018-12-06 ASSESSMENT — LOCATION DETAILED DESCRIPTION DERM
LOCATION DETAILED: RIGHT RIB CAGE
LOCATION DETAILED: LEFT MID-UPPER BACK
LOCATION DETAILED: RIGHT MEDIAL UPPER BACK
LOCATION DETAILED: RIGHT SUPERIOR MEDIAL UPPER BACK
LOCATION DETAILED: PERIUMBILICAL SKIN
LOCATION DETAILED: RIGHT PROXIMAL POSTERIOR UPPER ARM
LOCATION DETAILED: LEFT PROXIMAL POSTERIOR UPPER ARM
LOCATION DETAILED: RIGHT LATERAL BREAST 6-7:00 REGION
LOCATION DETAILED: LEFT ANTERIOR PROXIMAL THIGH
LOCATION DETAILED: RIGHT ANTERIOR PROXIMAL THIGH
LOCATION DETAILED: LEFT DORSAL GREAT TOE
LOCATION DETAILED: LEFT DORSAL 4TH TOE
LOCATION DETAILED: SUBXIPHOID
LOCATION DETAILED: SUPERIOR LUMBAR SPINE

## 2018-12-06 NOTE — HPI: FULL BODY SKIN EXAMINATION
What Is The Reason For Today's Visit?: Full Body Skin Examination
What Is The Reason For Today's Visit? (Being Monitored For X): concerning skin lesions on an annual basis
Additional History: The patient has not noticed any changing moles or any tender or bleeding spots.\\n\\nPatient is here for her annual wellness.

## 2018-12-06 NOTE — PROCEDURE: ADDITIONAL NOTES
Additional Notes: Amlactin lotion daily
Additional Notes: Amlactin cream recommended.
Detail Level: Simple

## 2018-12-06 NOTE — PROCEDURE: OBSERVATION
Size Of Lesion: 3 mm
Detail Level: Detailed
Size Of Lesion: 4 mm
Size Of Lesion: 3mm
Morphology Per Location (Optional): Reticular pattern
Size Of Lesion: 4mm
Size Of Lesion: 6mm

## 2019-02-04 ENCOUNTER — HOSPITAL ENCOUNTER (OUTPATIENT)
Dept: LAB | Facility: MEDICAL CENTER | Age: 34
End: 2019-02-04
Attending: FAMILY MEDICINE
Payer: COMMERCIAL

## 2019-02-04 DIAGNOSIS — G47.33 OSA (OBSTRUCTIVE SLEEP APNEA): ICD-10-CM

## 2019-02-04 DIAGNOSIS — Z00.00 HEALTH MAINTENANCE EXAMINATION: ICD-10-CM

## 2019-02-04 DIAGNOSIS — Z30.41 ENCOUNTER FOR SURVEILLANCE OF CONTRACEPTIVE PILLS: ICD-10-CM

## 2019-02-04 DIAGNOSIS — E03.8 SUBCLINICAL HYPOTHYROIDISM: ICD-10-CM

## 2019-02-04 LAB
ALBUMIN SERPL BCP-MCNC: 4.4 G/DL (ref 3.2–4.9)
ALBUMIN/GLOB SERPL: 1.7 G/DL
ALP SERPL-CCNC: 44 U/L (ref 30–99)
ALT SERPL-CCNC: 11 U/L (ref 2–50)
ANION GAP SERPL CALC-SCNC: 8 MMOL/L (ref 0–11.9)
AST SERPL-CCNC: 11 U/L (ref 12–45)
BASOPHILS # BLD AUTO: 0.8 % (ref 0–1.8)
BASOPHILS # BLD: 0.04 K/UL (ref 0–0.12)
BILIRUB SERPL-MCNC: 0.4 MG/DL (ref 0.1–1.5)
BUN SERPL-MCNC: 10 MG/DL (ref 8–22)
CALCIUM SERPL-MCNC: 9.2 MG/DL (ref 8.5–10.5)
CHLORIDE SERPL-SCNC: 106 MMOL/L (ref 96–112)
CHOLEST SERPL-MCNC: 194 MG/DL (ref 100–199)
CO2 SERPL-SCNC: 25 MMOL/L (ref 20–33)
CREAT SERPL-MCNC: 0.97 MG/DL (ref 0.5–1.4)
EOSINOPHIL # BLD AUTO: 0.15 K/UL (ref 0–0.51)
EOSINOPHIL NFR BLD: 3 % (ref 0–6.9)
ERYTHROCYTE [DISTWIDTH] IN BLOOD BY AUTOMATED COUNT: 39.2 FL (ref 35.9–50)
FASTING STATUS PATIENT QL REPORTED: NORMAL
GLOBULIN SER CALC-MCNC: 2.6 G/DL (ref 1.9–3.5)
GLUCOSE SERPL-MCNC: 95 MG/DL (ref 65–99)
HCT VFR BLD AUTO: 44.2 % (ref 37–47)
HDLC SERPL-MCNC: 49 MG/DL
HGB BLD-MCNC: 14.9 G/DL (ref 12–16)
IMM GRANULOCYTES # BLD AUTO: 0 K/UL (ref 0–0.11)
IMM GRANULOCYTES NFR BLD AUTO: 0 % (ref 0–0.9)
LDLC SERPL CALC-MCNC: 114 MG/DL
LYMPHOCYTES # BLD AUTO: 1.73 K/UL (ref 1–4.8)
LYMPHOCYTES NFR BLD: 34.6 % (ref 22–41)
MCH RBC QN AUTO: 30.9 PG (ref 27–33)
MCHC RBC AUTO-ENTMCNC: 33.7 G/DL (ref 33.6–35)
MCV RBC AUTO: 91.7 FL (ref 81.4–97.8)
MONOCYTES # BLD AUTO: 0.34 K/UL (ref 0–0.85)
MONOCYTES NFR BLD AUTO: 6.8 % (ref 0–13.4)
NEUTROPHILS # BLD AUTO: 2.74 K/UL (ref 2–7.15)
NEUTROPHILS NFR BLD: 54.8 % (ref 44–72)
NRBC # BLD AUTO: 0 K/UL
NRBC BLD-RTO: 0 /100 WBC
PLATELET # BLD AUTO: 200 K/UL (ref 164–446)
PMV BLD AUTO: 12.1 FL (ref 9–12.9)
POTASSIUM SERPL-SCNC: 3.8 MMOL/L (ref 3.6–5.5)
PROT SERPL-MCNC: 7 G/DL (ref 6–8.2)
RBC # BLD AUTO: 4.82 M/UL (ref 4.2–5.4)
SODIUM SERPL-SCNC: 139 MMOL/L (ref 135–145)
TRIGL SERPL-MCNC: 155 MG/DL (ref 0–149)
WBC # BLD AUTO: 5 K/UL (ref 4.8–10.8)

## 2019-02-04 PROCEDURE — 85025 COMPLETE CBC W/AUTO DIFF WBC: CPT

## 2019-02-04 PROCEDURE — 36415 COLL VENOUS BLD VENIPUNCTURE: CPT

## 2019-02-04 PROCEDURE — 80061 LIPID PANEL: CPT

## 2019-02-04 PROCEDURE — 80053 COMPREHEN METABOLIC PANEL: CPT

## 2019-03-13 ENCOUNTER — OFFICE VISIT (OUTPATIENT)
Dept: MEDICAL GROUP | Facility: LAB | Age: 34
End: 2019-03-13
Payer: COMMERCIAL

## 2019-03-13 VITALS
SYSTOLIC BLOOD PRESSURE: 118 MMHG | HEART RATE: 100 BPM | HEIGHT: 70 IN | WEIGHT: 148.2 LBS | BODY MASS INDEX: 21.22 KG/M2 | DIASTOLIC BLOOD PRESSURE: 72 MMHG | TEMPERATURE: 97.9 F | OXYGEN SATURATION: 95 % | RESPIRATION RATE: 12 BRPM

## 2019-03-13 DIAGNOSIS — N93.9 ABNORMAL UTERINE BLEEDING: ICD-10-CM

## 2019-03-13 DIAGNOSIS — Z30.41 ENCOUNTER FOR SURVEILLANCE OF CONTRACEPTIVE PILLS: ICD-10-CM

## 2019-03-13 DIAGNOSIS — Z83.49 FAMILY HISTORY OF HYPOTHYROIDISM: ICD-10-CM

## 2019-03-13 DIAGNOSIS — E03.8 SUBCLINICAL HYPOTHYROIDISM: ICD-10-CM

## 2019-03-13 DIAGNOSIS — G47.33 OSA (OBSTRUCTIVE SLEEP APNEA): ICD-10-CM

## 2019-03-13 DIAGNOSIS — R07.89 OTHER CHEST PAIN: ICD-10-CM

## 2019-03-13 PROCEDURE — 99214 OFFICE O/P EST MOD 30 MIN: CPT | Performed by: FAMILY MEDICINE

## 2019-03-13 RX ORDER — LEVONORGESTREL AND ETHINYL ESTRADIOL 0.15-0.03
1 KIT ORAL DAILY
Qty: 84 TAB | Refills: 4 | Status: SHIPPED | OUTPATIENT
Start: 2019-03-13 | End: 2019-04-25 | Stop reason: SDUPTHER

## 2019-03-13 ASSESSMENT — PATIENT HEALTH QUESTIONNAIRE - PHQ9: CLINICAL INTERPRETATION OF PHQ2 SCORE: 0

## 2019-03-13 NOTE — PROGRESS NOTES
Subjective:   Amelia Love is a 33 y.o. female here today for   Chief Complaint   Patient presents with   • Follow-Up     menstration cycle, not better, flu symtpoms during peroid    • Breast Pain     left side, under armpit, X couple months.        1. Encounter for surveillance of contraceptive pills  Patient would like to talk about birth control and menstruation today.  She reports that she has had fatigue, body aches, and lightheadedness for the last 4 cycles.  She is having normal periods.  She is currently on the same birth control that she has been on for the last year.  She was tolerating this well previously.  Her periods have been slightly heavier.  She does have a history of slightly elevated TSH in the past.    2. Family history of hypothyroidism  3. Subclinical hypothyroidism  Chronic.  Multiple female family members have hypothyroidism.  She did show a slight deviation in her TSH last year but this corrected on its own without therapy.    4. ENRRIQUE (obstructive sleep apnea)  Chronic.  Following with both her dentist and her sleep physician.  They have recommended a dental device.  She has gotten this approved but is having a difficult time keeping this in her mouth.  She follows up with Dr. Winter next week    5. Abnormal uterine bleeding  Chronic.  Patient did try to skip birth control placebo pills in the past but had a lot of breakthrough bleeding.  She started taking the birth control as prescribed and the breakthrough bleeding improved.  She is now having heavier periods    6.  Chest pain  This is a new problem to discuss.  Patient reports left lateral chest pain along the latissimus.  It extends up into the breast.  She has recently started exercising at the gym about 3 months ago.  Her symptoms started about 2 months ago.  She denies any nodules or changes in the breast.  No redness or swelling    Current medicines (including changes today)  Current Outpatient Prescriptions   Medication Sig  "Dispense Refill   • levonorgestrel-ethinyl estradiol (SEASONALE) 0.15-0.03 MG per tablet Take 1 Tab by mouth every day. 84 Tab 4     No current facility-administered medications for this visit.      She  has a past medical history of Seasonal allergic rhinitis (11/15/2013).    ROS   No fevers  No bowel changes  No LE edema       Objective:     Blood pressure 118/72, pulse 100, temperature 36.6 °C (97.9 °F), temperature source Temporal, resp. rate 12, height 1.778 m (5' 10\"), weight 67.2 kg (148 lb 3.2 oz), last menstrual period 03/04/2019, SpO2 95 %, not currently breastfeeding. Body mass index is 21.26 kg/m².   Physical Exam:  Constitutional: Alert, no distress.  Skin: Warm, dry, good turgor, no rashes in visible areas.  Eye: Equal, round and reactive, conjunctiva clear, lids normal.  ENMT: Lips without lesions, good dentition, oropharynx clear.  Neck: Trachea midline, no masses, no thyromegaly. No cervical or supraclavicular lymphadenopathy  Respiratory: Unlabored respiratory effort, lungs clear to auscultation, no wheezes, no ronchi.  Cardiovascular: Normal S1, S2, RRR, no murmur, no edema.  Abdomen: Soft, non-tender, no masses, no hepatosplenomegaly.  Psych: Alert and oriented x3, normal affect and mood.  BREAST EXAM: No skin changes, peau d'orange or nipple retraction. No discharge. No axillary or supraclavicular adenopathy. No masses or nodularity palpable.       Assessment and Plan:   The following treatment plan was discussed    1. Encounter for surveillance of contraceptive pills  Chronic, patient is now having symptoms that may be associated with PMS during her menstrual cycle.  We discussed differential diagnoses including adverse reaction to birth control, heavy periods.,  PMS, thyroid dysfunction.  We are going to check labs and change her to Seasonale to see if skipping periods may help her symptoms.  Discussed OCPs at length. Instructed on importance of taking pill at the same time every day. Use " back-up for up to 2 weeks. Discussed risks associated with OCPs including: blood clot, heart attack, and stroke. Advised not to smoke while on hormonal birth control.  - levonorgestrel-ethinyl estradiol (SEASONALE) 0.15-0.03 MG per tablet; Take 1 Tab by mouth every day.  Dispense: 84 Tab; Refill: 4    2. Family history of hypothyroidism  3. Subclinical hypothyroidism  Chronic, recheck thyroid labs given her new symptoms of fatigue  - TSH; Future  - FREE THYROXINE; Future  - CBC WITH DIFFERENTIAL; Future  - FERRITIN; Future  - IRON/TOTAL IRON BIND; Future    4. ENRRIQUE (obstructive sleep apnea)  Chronic, stable and following with her sleep physician and dentist.  She is working to try to get her dental device to stay in her mouth at nighttime  - TSH; Future  - FREE THYROXINE; Future  - CBC WITH DIFFERENTIAL; Future  - FERRITIN; Future  - IRON/TOTAL IRON BIND; Future    5. Abnormal uterine bleeding  Chronic, resolved with change of birth control therapy taking it monthly.  We are going to try Seasonale to see if we can have her skip periods.  We also discussed other birth control means such as IUD.  - TSH; Future  - FREE THYROXINE; Future  - CBC WITH DIFFERENTIAL; Future  - FERRITIN; Future  - IRON/TOTAL IRON BIND; Future      Followup: Return in about 6 months (around 9/13/2019), or if symptoms worsen or fail to improve, for Annual.       This note was created using voice recognition software. I have made every reasonable attempt to correct errors, however, I do anticipate some grammatical errors.

## 2019-03-20 ENCOUNTER — SLEEP CENTER VISIT (OUTPATIENT)
Dept: SLEEP MEDICINE | Facility: MEDICAL CENTER | Age: 34
End: 2019-03-20
Payer: COMMERCIAL

## 2019-03-20 VITALS
HEIGHT: 71 IN | RESPIRATION RATE: 15 BRPM | HEART RATE: 90 BPM | SYSTOLIC BLOOD PRESSURE: 114 MMHG | OXYGEN SATURATION: 97 % | WEIGHT: 152 LBS | DIASTOLIC BLOOD PRESSURE: 70 MMHG | BODY MASS INDEX: 21.28 KG/M2

## 2019-03-20 DIAGNOSIS — G47.33 OSA (OBSTRUCTIVE SLEEP APNEA): ICD-10-CM

## 2019-03-20 PROCEDURE — 99213 OFFICE O/P EST LOW 20 MIN: CPT | Performed by: FAMILY MEDICINE

## 2019-03-20 NOTE — PROGRESS NOTES
[facility] Follow Up Note     Date: 3/20/2019 / Time: 8:27 AM    Patient ID:   Name:             Amelia Love   YOB: 1985  Age:                 33 y.o.  female   MRN:               [id number]      Thank you for requesting a sleep medicine consultation on Amelia Love at the sleep center. She presents today with the chief complaints of ENRRIQUE follow up.     HISTORY OF PRESENT ILLNESS:       Pt is currently not on dental appliance. She goes to sleep around 8:30 pm and wakes up around 4:30 am. She is getting about 8-9 hrs of sleep on a good night. The bad nights are rare. Overall, she does finds her sleep refreshing. When she wakes up in the morning, She does feel tired. She does not take regular naps.   She is using dental appliance on most days of the week. Pt denies snoring, gasping,choking. The symptoms of excessive daytime, snoring has continued.         SLEEP HISTORY   PSG on 03/03/18 it was mild obstructive sleep apnea as shown by the apnea hypopnea index of 6.4/hr. There was a total of 5 apneas and 36 hypopneas. In the supine position the respiratory disturbance index was 12.6 an hour and in the non-supine position the respiratory disturbance index was 4.3 per hour. The respiratory events were associated with O2 brad of 91% and averahe O2 saturation of 95%      REVIEW OF SYSTEMS:       Constitutional: Denies fevers, Denies weight changes  Eyes: Denies changes in vision, no eye pain  Ears/Nose/Throat/Mouth: Denies nasal congestion or sore throat   Cardiovascular: Denies chest pain or palpitations   Respiratory: Denies shortness of breath , Denies cough  Gastrointestinal/Hepatic: Denies abdominal pain, nausea, vomiting, diarrhea, constipation or GI bleeding   Genitourinary: Deniesdysuria or frequency  Musculoskeletal/Rheum: Denies  joint pain and swelling   Skin/Breast: Denies rash,   Neurological: Denies headache, confusion, memory loss or focal  "weakness/parasthesias  Psychiatric: denies mood disorder   Sleep: + snoring and EDS    Comprehensive review of systems form is reviewed with the patient and is attached in the EMR.     PMH:  has a past medical history of Seasonal allergic rhinitis (11/15/2013).  MEDS:   Current Outpatient Prescriptions:   •  levonorgestrel-ethinyl estradiol (SEASONALE) 0.15-0.03 MG per tablet, Take 1 Tab by mouth every day., Disp: 84 Tab, Rfl: 4  ALLERGIES:   Allergies   Allergen Reactions   • Minocycline    • Tetracycline      SURGHX:   Past Surgical History:   Procedure Laterality Date   • DENTAL EXTRACTION(S)       SOCHX:  reports that she has never smoked. She has never used smokeless tobacco. She reports that she drinks about 0.5 oz of alcohol per week . She reports that she does not use drugs..  FH:   Family History   Problem Relation Age of Onset   • Hypertension Father    • Hyperlipidemia Mother    • Other Mother 45        MS    • Thyroid Sister    • Cancer Maternal Grandmother 50        breast   • Thyroid Maternal Grandmother    • Allergies Sister    • Thyroid Maternal Aunt    • Alcohol/Drug Neg Hx    • Diabetes Neg Hx    • Heart Disease Neg Hx    • Psychiatry Neg Hx          Physical Exam:  Vitals/ General Appearance:   Weight/BMI: Body mass index is 21.2 kg/m².  Blood pressure 114/70, pulse 90, resp. rate 15, height 1.803 m (5' 11\"), weight 68.9 kg (152 lb), last menstrual period 03/04/2019, SpO2 97 %, not currently breastfeeding.  Vitals:    03/20/19 0823   BP: 114/70   BP Location: Left arm   Patient Position: Sitting   BP Cuff Size: Adult   Pulse: 90   Resp: 15   SpO2: 97%   Weight: 68.9 kg (152 lb)   Height: 1.803 m (5' 11\")       Pt. is alert and oriented to time, place and person. Cooperative and in no apparent distress.       1. Head: Atraumatic, normocephalic.   2. Ears: Normal tympanic membrane and no discharge  3. Nose: No inferior turbinate hypertophy  4. Throat: Oropharynx appears crowded in that the palate is " overhanging   5. Neck: Supple. No thyromegaly  6. Chest: Trachea central, no spine deformity   7. Lungs auscultation: B/L good air entry, vesicular breath sounds, no adventitious sounds  8. Heart auscultation: 1st and 2nd heart sounds normal, regular rhythm. No appreciable murmur.  9. . Extremities: no clubbing, no pedal edema.  10. Skin: No rash  11. NEUROLOGICAL EXAMINATION: On neurological exam, the patient was alert and oriented x3. speech was clear and fluent without dysarthria.      ASSESSMENT AND PLAN     1. Sleep Apnea     The pathophysiology of sleep anea and the increased risk of cardiovascular morbidity from untreated sleep apnea is discussed in detail with the patient.      She is urged to avoid supine sleep, weight gain and alcoholic beverages since all of these can worsen sleep apnea. She is cautioned against drowsy driving. If She feels sleepy while driving, She must pull over for a break/nap, rather than persist on the road, in the interest of She own safety and that of others on the road.   Plan   - Continue dental appliance, titration by dentist    - HST will be done on f/u       2. Regarding treatment of other past medical problems and general health maintenance,  She is urged to follow up with PCP.

## 2019-04-25 DIAGNOSIS — Z30.41 ENCOUNTER FOR SURVEILLANCE OF CONTRACEPTIVE PILLS: ICD-10-CM

## 2019-04-25 RX ORDER — LEVONORGESTREL AND ETHINYL ESTRADIOL 0.15-0.03
1 KIT ORAL DAILY
Qty: 28 TAB | Refills: 5 | Status: SHIPPED | OUTPATIENT
Start: 2019-04-25 | End: 2020-01-09 | Stop reason: SDUPTHER

## 2019-04-25 NOTE — TELEPHONE ENCOUNTER
----- Message from Amelia Love sent at 4/25/2019  1:50 PM PDT -----  Regarding: Prescription Question  Contact: [phone]  [place],  I left a voicemail with your assistant, but just thought I would follow up with a message. Can you please send my birth control prescription to the [facility] pharmacy [address], [id number]? I have refilled my prescription with [facility], but they had an issue with mailing it and now it will not arrive on time. I would like to request a hold over prescription of the birth control pills in the mean time if possible. Thanks!    Amelia

## 2019-06-20 ENCOUNTER — HOSPITAL ENCOUNTER (OUTPATIENT)
Dept: LAB | Facility: MEDICAL CENTER | Age: 34
End: 2019-06-20
Attending: FAMILY MEDICINE
Payer: COMMERCIAL

## 2019-06-20 DIAGNOSIS — E03.8 SUBCLINICAL HYPOTHYROIDISM: ICD-10-CM

## 2019-06-20 DIAGNOSIS — N93.9 ABNORMAL UTERINE BLEEDING: ICD-10-CM

## 2019-06-20 DIAGNOSIS — G47.33 OSA (OBSTRUCTIVE SLEEP APNEA): ICD-10-CM

## 2019-06-20 DIAGNOSIS — Z83.49 FAMILY HISTORY OF HYPOTHYROIDISM: ICD-10-CM

## 2019-06-20 LAB
BASOPHILS # BLD AUTO: 0.6 % (ref 0–1.8)
BASOPHILS # BLD: 0.04 K/UL (ref 0–0.12)
EOSINOPHIL # BLD AUTO: 0.03 K/UL (ref 0–0.51)
EOSINOPHIL NFR BLD: 0.4 % (ref 0–6.9)
ERYTHROCYTE [DISTWIDTH] IN BLOOD BY AUTOMATED COUNT: 39.8 FL (ref 35.9–50)
FERRITIN SERPL-MCNC: 32.4 NG/ML (ref 10–291)
HCT VFR BLD AUTO: 44.4 % (ref 37–47)
HGB BLD-MCNC: 14.3 G/DL (ref 12–16)
IMM GRANULOCYTES # BLD AUTO: 0.02 K/UL (ref 0–0.11)
IMM GRANULOCYTES NFR BLD AUTO: 0.3 % (ref 0–0.9)
IRON SATN MFR SERPL: 22 % (ref 15–55)
IRON SERPL-MCNC: 102 UG/DL (ref 40–170)
LYMPHOCYTES # BLD AUTO: 1.16 K/UL (ref 1–4.8)
LYMPHOCYTES NFR BLD: 17.1 % (ref 22–41)
MCH RBC QN AUTO: 29.5 PG (ref 27–33)
MCHC RBC AUTO-ENTMCNC: 32.2 G/DL (ref 33.6–35)
MCV RBC AUTO: 91.7 FL (ref 81.4–97.8)
MONOCYTES # BLD AUTO: 0.28 K/UL (ref 0–0.85)
MONOCYTES NFR BLD AUTO: 4.1 % (ref 0–13.4)
NEUTROPHILS # BLD AUTO: 5.25 K/UL (ref 2–7.15)
NEUTROPHILS NFR BLD: 77.5 % (ref 44–72)
NRBC # BLD AUTO: 0 K/UL
NRBC BLD-RTO: 0 /100 WBC
PLATELET # BLD AUTO: 181 K/UL (ref 164–446)
PMV BLD AUTO: 12.4 FL (ref 9–12.9)
RBC # BLD AUTO: 4.84 M/UL (ref 4.2–5.4)
T4 FREE SERPL-MCNC: 1.24 NG/DL (ref 0.53–1.43)
TIBC SERPL-MCNC: 473 UG/DL (ref 250–450)
TSH SERPL DL<=0.005 MIU/L-ACNC: 1.73 UIU/ML (ref 0.38–5.33)
WBC # BLD AUTO: 6.8 K/UL (ref 4.8–10.8)

## 2019-06-20 PROCEDURE — 83550 IRON BINDING TEST: CPT

## 2019-06-20 PROCEDURE — 84443 ASSAY THYROID STIM HORMONE: CPT

## 2019-06-20 PROCEDURE — 36415 COLL VENOUS BLD VENIPUNCTURE: CPT

## 2019-06-20 PROCEDURE — 82728 ASSAY OF FERRITIN: CPT

## 2019-06-20 PROCEDURE — 84439 ASSAY OF FREE THYROXINE: CPT

## 2019-06-20 PROCEDURE — 85025 COMPLETE CBC W/AUTO DIFF WBC: CPT

## 2019-06-20 PROCEDURE — 83540 ASSAY OF IRON: CPT

## 2019-10-02 ENCOUNTER — TELEPHONE (OUTPATIENT)
Dept: MEDICAL GROUP | Facility: LAB | Age: 34
End: 2019-10-02

## 2019-10-10 ENCOUNTER — OFFICE VISIT (OUTPATIENT)
Dept: MEDICAL GROUP | Facility: LAB | Age: 34
End: 2019-10-10
Payer: COMMERCIAL

## 2019-10-10 VITALS
HEIGHT: 70 IN | WEIGHT: 143 LBS | HEART RATE: 100 BPM | SYSTOLIC BLOOD PRESSURE: 112 MMHG | RESPIRATION RATE: 16 BRPM | BODY MASS INDEX: 20.47 KG/M2 | DIASTOLIC BLOOD PRESSURE: 80 MMHG | OXYGEN SATURATION: 98 % | TEMPERATURE: 98.8 F

## 2019-10-10 DIAGNOSIS — E78.5 DYSLIPIDEMIA: ICD-10-CM

## 2019-10-10 DIAGNOSIS — E03.8 SUBCLINICAL HYPOTHYROIDISM: ICD-10-CM

## 2019-10-10 DIAGNOSIS — N93.9 ABNORMAL UTERINE BLEEDING: ICD-10-CM

## 2019-10-10 PROCEDURE — 99214 OFFICE O/P EST MOD 30 MIN: CPT | Performed by: FAMILY MEDICINE

## 2019-10-10 NOTE — PATIENT INSTRUCTIONS
"Heart-Healthy Eating Plan  Introduction  Heart-healthy meal planning includes:  · Limiting unhealthy fats.  · Increasing healthy fats.  · Making other small dietary changes.  You may need to talk with your doctor or a diet specialist (dietitian) to create an eating plan that is right for you.  What types of fat should I choose?  · Choose healthy fats. These include olive oil and canola oil, flaxseeds, walnuts, almonds, and seeds.  · Eat more omega-3 fats. These include salmon, mackerel, sardines, tuna, flaxseed oil, and ground flaxseeds. Try to eat fish at least twice each week.  · Limit saturated fats.  ¨ Saturated fats are often found in animal products, such as meats, butter, and cream.  ¨ Plant sources of saturated fats include palm oil, palm kernel oil, and coconut oil.  · Avoid foods with partially hydrogenated oils in them. These include stick margarine, some tub margarines, cookies, crackers, and other baked goods. These contain trans fats.  What general guidelines do I need to follow?  · Check food labels carefully. Identify foods with trans fats or high amounts of saturated fat.  · Fill one half of your plate with vegetables and green salads. Eat 4-5 servings of vegetables per day. A serving of vegetables is:  ¨ 1 cup of raw leafy vegetables.  ¨ ½ cup of raw or cooked cut-up vegetables.  ¨ ½ cup of vegetable juice.  · Fill one fourth of your plate with whole grains. Look for the word \"whole\" as the first word in the ingredient list.  · Fill one fourth of your plate with lean protein foods.  · Eat 4-5 servings of fruit per day. A serving of fruit is:  ¨ One medium whole fruit.  ¨ ¼ cup of dried fruit.  ¨ ½ cup of fresh, frozen, or canned fruit.  ¨ ½ cup of 100% fruit juice.  · Eat more foods that contain soluble fiber. These include apples, broccoli, carrots, beans, peas, and barley. Try to get 20-30 g of fiber per day.  · Eat more home-cooked food. Eat less restaurant, buffet, and fast food.  · Limit or " avoid alcohol.  · Limit foods high in starch and sugar.  · Avoid fried foods.  · Avoid frying your food. Try baking, boiling, grilling, or broiling it instead. You can also reduce fat by:  ¨ Removing the skin from poultry.  ¨ Removing all visible fats from meats.  ¨ Skimming the fat off of stews, soups, and gravies before serving them.  ¨ Steaming vegetables in water or broth.  · Lose weight if you are overweight.  · Eat 4-5 servings of nuts, legumes, and seeds per week:  ¨ One serving of dried beans or legumes equals ½ cup after being cooked.  ¨ One serving of nuts equals 1½ ounces.  ¨ One serving of seeds equals ½ ounce or one tablespoon.  · You may need to keep track of how much salt or sodium you eat. This is especially true if you have high blood pressure. Talk with your doctor or dietitian to get more information.  What foods can I eat?  Grains   Breads, including Malagasy, white, khushbu, wheat, raisin, rye, oatmeal, and Italian. Tortillas that are neither fried nor made with lard or trans fat. Low-fat rolls, including hotdog and hamburger buns and English muffins. Biscuits. Muffins. Waffles. Pancakes. Light popcorn. Whole-grain cereals. Flatbread. [place] toast. Pretzels. Breadsticks. Rusks. Low-fat snacks. Low-fat crackers, including oyster, saltine, matzo, aleida, animal, and rye. Rice and pasta, including brown rice and pastas that are made with whole wheat.  Vegetables   All vegetables.  Fruits   All fruits, but limit coconut.  Meats and Other Protein Sources   Lean, well-trimmed beef, veal, pork, and lamb. Chicken and turkey without skin. All fish and shellfish. Wild duck, rabbit, pheasant, and venison. Egg whites or low-cholesterol egg substitutes. Dried beans, peas, lentils, and tofu. Seeds and most nuts.  Dairy   Low-fat or nonfat cheeses, including ricotta, string, and mozzarella. Skim or 1% milk that is liquid, powdered, or evaporated. Buttermilk that is made with low-fat milk. Nonfat or low-fat  yogurt.  Beverages   Mineral water. Diet carbonated beverages.  Sweets and Desserts   Sherbets and fruit ices. Honey, jam, marmalade, jelly, and syrups. Meringues and gelatins. Pure sugar candy, such as hard candy, jelly beans, gumdrops, mints, marshmallows, and small amounts of dark chocolate. [place] food cake.  Eat all sweets and desserts in moderation.  Fats and Oils   Nonhydrogenated (trans-free) margarines. Vegetable oils, including soybean, sesame, sunflower, olive, peanut, safflower, corn, canola, and cottonseed. Salad dressings or mayonnaise made with a vegetable oil. Limit added fats and oils that you use for cooking, baking, salads, and as spreads.  Other   Cocoa powder. Coffee and tea. All seasonings and condiments.  The items listed above may not be a complete list of recommended foods or beverages. Contact your dietitian for more options.   What foods are not recommended?  Grains   Breads that are made with saturated or trans fats, oils, or whole milk. Croissants. Butter rolls. Cheese breads. Sweet rolls. Donuts. Buttered popcorn. Chow mein noodles. High-fat crackers, such as cheese or butter crackers.  Meats and Other Protein Sources   Fatty meats, such as hotdogs, short ribs, sausage, spareribs, yan, rib eye roast or steak, and mutton. High-fat deli meats, such as salami and bologna. Caviar. Domestic duck and goose. Organ meats, such as kidney, liver, sweetbreads, and heart.  Dairy   Cream, sour cream, cream cheese, and creamed cottage cheese. Whole-milk cheeses, including blue (chintan), [place], Brie, Michele, American, Havarti, Swiss, cheddar, Camembert, and [place]. Whole or 2% milk that is liquid, evaporated, or condensed. Whole buttermilk. Cream sauce or high-fat cheese sauce. Yogurt that is made from whole milk.  Beverages   Regular sodas and juice drinks with added sugar.  Sweets and Desserts   Frosting. Pudding. Cookies. Cakes other than harman food cake. Candy that has milk chocolate or  white chocolate, hydrogenated fat, butter, coconut, or unknown ingredients. Buttered syrups. Full-fat ice cream or ice cream drinks.  Fats and Oils   Gravy that has suet, meat fat, or shortening. Cocoa butter, hydrogenated oils, palm oil, coconut oil, palm kernel oil. These can often be found in baked products, candy, fried foods, nondairy creamers, and whipped toppings. Solid fats and shortenings, including yan fat, salt pork, lard, and butter. Nondairy cream substitutes, such as coffee creamers and sour cream substitutes. Salad dressings that are made of unknown oils, cheese, or sour cream.  The items listed above may not be a complete list of foods and beverages to avoid. Contact your dietitian for more information.   This information is not intended to replace advice given to you by your health care provider. Make sure you discuss any questions you have with your health care provider.  Document Released: 06/18/2013 Document Revised: 05/25/2017 Document Reviewed: 06/11/2015  © 2017 [employer]

## 2019-10-15 NOTE — ASSESSMENT & PLAN NOTE
Results for MALIK ENCISO (MRN [id number]) as of 10/15/2019 14:01   Ref. Range 8/15/2017 06:26 6/25/2018 07:27 2/4/2019 06:24   Cholesterol,Tot Latest Ref Range: 100 - 199 mg/dL 164  194   Triglycerides Latest Ref Range: 0 - 149 mg/dL 89  155 (H)   HDL Latest Ref Range: >=40 mg/dL 52  49   LDL Latest Ref Range: <100 mg/dL 94  114 (H)   Patient would like to try dietary modifications

## 2019-10-15 NOTE — PROGRESS NOTES
Subjective:     Chief Complaint   Patient presents with   • Other     lab review     Malik is a regular patient of Dr. Ni's  Malik Jing Love is a 34 y.o. female here today for evaluation and management of:    Abnormal uterine bleeding  She switched to Seasonale and this is working well.    Dyslipidemia  Results for MALIK LOVE (MRN [id number]) as of 10/15/2019 14:01   Ref. Range 8/15/2017 06:26 6/25/2018 07:27 2/4/2019 06:24   Cholesterol,Tot Latest Ref Range: 100 - 199 mg/dL 164  194   Triglycerides Latest Ref Range: 0 - 149 mg/dL 89  155 (H)   HDL Latest Ref Range: >=40 mg/dL 52  49   LDL Latest Ref Range: <100 mg/dL 94  114 (H)   Patient would like to try dietary modifications    Subclinical hypothyroidism  Results for MALIK LOVE (MRN [id number]) as of 10/15/2019 14:01   Ref. Range 8/15/2017 06:26 6/25/2018 07:27 2/4/2019 06:24 6/20/2019 10:34   TSH Latest Ref Range: 0.380 - 5.330 uIU/mL 3.970 (H) 2.870  1.730   Free T-4 Latest Ref Range: 0.53 - 1.43 ng/dL 1.09 1.04  1.24        Allergies   Allergen Reactions   • Minocycline    • Tetracycline        Current medicines (including changes today)  Current Outpatient Medications   Medication Sig Dispense Refill   • levonorgestrel-ethinyl estradiol (SEASONALE) 0.15-0.03 MG per tablet Take 1 Tab by mouth every day. 28 Tab 5     No current facility-administered medications for this visit.        She  has a past medical history of Seasonal allergic rhinitis (11/15/2013).    Patient Active Problem List    Diagnosis Date Noted   • Dyslipidemia 10/10/2019   • Encounter for surveillance of contraceptive pills 03/13/2019   • Family history of hypothyroidism 08/14/2018   • ENRRIQUE (obstructive sleep apnea) 05/02/2018   • Subclinical hypothyroidism 08/15/2017   • Abnormal uterine bleeding 11/18/2016   • Multiple nevi 11/18/2016       ROS   No fever or chills.  No nausea or vomiting.  No chest pain or palpitations.  No cough or SOB.  No pain with  "urination or hematuria.  No black or bloody stools.       Objective:     /80 (BP Location: Right arm, Patient Position: Sitting, BP Cuff Size: Adult)   Pulse 100   Temp 37.1 °C (98.8 °F) (Temporal)   Resp 16   Ht 1.778 m (5' 10\")   Wt 64.9 kg (143 lb)   SpO2 98%  Body mass index is 20.52 kg/m².   Physical Exam:  Well developed, well nourished.  Alert, oriented in no acute distress.  Eye contact is good, speech goal directed, affect calm  Eyes: conjunctiva non-injected, sclera non-icteric.  Neck Supple.  No adenopathy or masses in the neck or supraclavicular regions. No thyromegaly  Lungs: clear to auscultation bilaterally with good excursion. No wheezes or rhonchi  CV: regular rate and rhythm. No murmur            Assessment and Plan:   The following treatment plan was discussed    1. Dyslipidemia  Dietary counseling done.  Recheck in 6 months  - Lipid Profile; Future    2. Subclinical hypothyroidism  Continue to monitor labs    3. Abnormal uterine bleeding  Continue Seasonique    Any change or worsening of signs or symptoms, patient encouraged to follow-up or report to the emergency room for further evaluation. Patient understands and agrees.    Followup: Return in about 6 months (around 4/10/2020).           "

## 2019-10-15 NOTE — ASSESSMENT & PLAN NOTE
Results for MALIK ENCISO (MRN [id number]) as of 10/15/2019 14:01   Ref. Range 8/15/2017 06:26 6/25/2018 07:27 2/4/2019 06:24 6/20/2019 10:34   TSH Latest Ref Range: 0.380 - 5.330 uIU/mL 3.970 (H) 2.870  1.730   Free T-4 Latest Ref Range: 0.53 - 1.43 ng/dL 1.09 1.04  1.24

## 2019-11-21 ENCOUNTER — HOSPITAL ENCOUNTER (OUTPATIENT)
Dept: LAB | Facility: MEDICAL CENTER | Age: 34
End: 2019-11-21
Attending: FAMILY MEDICINE
Payer: COMMERCIAL

## 2019-11-21 DIAGNOSIS — E78.5 DYSLIPIDEMIA: ICD-10-CM

## 2019-11-21 LAB
CHOLEST SERPL-MCNC: 171 MG/DL (ref 100–199)
FASTING STATUS PATIENT QL REPORTED: NORMAL
HDLC SERPL-MCNC: 47 MG/DL
LDLC SERPL CALC-MCNC: 108 MG/DL
TRIGL SERPL-MCNC: 80 MG/DL (ref 0–149)

## 2019-11-21 PROCEDURE — 80061 LIPID PANEL: CPT

## 2019-11-21 PROCEDURE — 36415 COLL VENOUS BLD VENIPUNCTURE: CPT

## 2019-12-09 ENCOUNTER — APPOINTMENT (RX ONLY)
Dept: URBAN - METROPOLITAN AREA CLINIC 31 | Facility: CLINIC | Age: 34
Setting detail: DERMATOLOGY
End: 2019-12-09

## 2019-12-09 DIAGNOSIS — L82.1 OTHER SEBORRHEIC KERATOSIS: ICD-10-CM

## 2019-12-09 DIAGNOSIS — Q826 OTHER SPECIFIED ANOMALIES OF SKIN: ICD-10-CM

## 2019-12-09 DIAGNOSIS — Q828 OTHER SPECIFIED ANOMALIES OF SKIN: ICD-10-CM

## 2019-12-09 DIAGNOSIS — L81.4 OTHER MELANIN HYPERPIGMENTATION: ICD-10-CM

## 2019-12-09 DIAGNOSIS — Q819 OTHER SPECIFIED ANOMALIES OF SKIN: ICD-10-CM

## 2019-12-09 DIAGNOSIS — D18.0 HEMANGIOMA: ICD-10-CM

## 2019-12-09 DIAGNOSIS — Z71.89 OTHER SPECIFIED COUNSELING: ICD-10-CM

## 2019-12-09 DIAGNOSIS — D22 MELANOCYTIC NEVI: ICD-10-CM

## 2019-12-09 PROBLEM — D23.62 OTHER BENIGN NEOPLASM OF SKIN OF LEFT UPPER LIMB, INCLUDING SHOULDER: Status: ACTIVE | Noted: 2019-12-09

## 2019-12-09 PROBLEM — D22.5 MELANOCYTIC NEVI OF TRUNK: Status: ACTIVE | Noted: 2019-12-09

## 2019-12-09 PROBLEM — D18.01 HEMANGIOMA OF SKIN AND SUBCUTANEOUS TISSUE: Status: ACTIVE | Noted: 2019-12-09

## 2019-12-09 PROBLEM — Q82.8 OTHER SPECIFIED CONGENITAL MALFORMATIONS OF SKIN: Status: ACTIVE | Noted: 2019-12-09

## 2019-12-09 PROCEDURE — ? ADDITIONAL NOTES

## 2019-12-09 PROCEDURE — 99395 PREV VISIT EST AGE 18-39: CPT

## 2019-12-09 PROCEDURE — ? COUNSELING

## 2019-12-09 PROCEDURE — ? OBSERVATION AND MEASURE

## 2019-12-09 ASSESSMENT — LOCATION ZONE DERM
LOCATION ZONE: ARM
LOCATION ZONE: TRUNK

## 2019-12-09 ASSESSMENT — LOCATION DETAILED DESCRIPTION DERM
LOCATION DETAILED: LEFT MEDIAL SUPERIOR CHEST
LOCATION DETAILED: LEFT RIB CAGE
LOCATION DETAILED: RIGHT PROXIMAL POSTERIOR UPPER ARM
LOCATION DETAILED: LEFT PROXIMAL POSTERIOR UPPER ARM
LOCATION DETAILED: RIGHT LATERAL BREAST 6-7:00 REGION
LOCATION DETAILED: EPIGASTRIC SKIN
LOCATION DETAILED: LEFT MEDIAL UPPER BACK

## 2019-12-09 ASSESSMENT — LOCATION SIMPLE DESCRIPTION DERM
LOCATION SIMPLE: CHEST
LOCATION SIMPLE: RIGHT POSTERIOR UPPER ARM
LOCATION SIMPLE: LEFT POSTERIOR UPPER ARM
LOCATION SIMPLE: ABDOMEN
LOCATION SIMPLE: RIGHT BREAST
LOCATION SIMPLE: LEFT UPPER BACK

## 2019-12-09 NOTE — HPI: FULL BODY SKIN EXAMINATION
How Severe Are Your Spot(S)?: mild
What Is The Reason For Today's Visit?: Full Body Skin Examination with No Concerns
What Is The Reason For Today's Visit? (Being Monitored For X): concerning skin lesions on an annual basis
Additional History: Patient has not noticed any changing moles or any tender or bleeding spots.

## 2019-12-09 NOTE — PROCEDURE: OBSERVATION
Size Of Lesion: 1mm
Detail Level: Detailed
Size Of Lesion: 3mm
Morphology Per Location (Optional): Reticular pattern

## 2020-01-02 ENCOUNTER — OFFICE VISIT (OUTPATIENT)
Dept: MEDICAL GROUP | Facility: LAB | Age: 35
End: 2020-01-02
Payer: COMMERCIAL

## 2020-01-02 VITALS
BODY MASS INDEX: 20.33 KG/M2 | WEIGHT: 142 LBS | HEART RATE: 74 BPM | TEMPERATURE: 89.9 F | SYSTOLIC BLOOD PRESSURE: 116 MMHG | HEIGHT: 70 IN | OXYGEN SATURATION: 99 % | DIASTOLIC BLOOD PRESSURE: 82 MMHG | RESPIRATION RATE: 19 BRPM

## 2020-01-02 DIAGNOSIS — L03.116 CELLULITIS OF LEFT LOWER EXTREMITY: ICD-10-CM

## 2020-01-02 DIAGNOSIS — L60.8 NAIL DEFORMITY: ICD-10-CM

## 2020-01-02 PROBLEM — L03.90 CELLULITIS: Status: ACTIVE | Noted: 2020-01-02

## 2020-01-02 PROCEDURE — 99214 OFFICE O/P EST MOD 30 MIN: CPT | Performed by: FAMILY MEDICINE

## 2020-01-02 RX ORDER — AMOXICILLIN AND CLAVULANATE POTASSIUM 875; 125 MG/1; MG/1
1 TABLET, FILM COATED ORAL 2 TIMES DAILY
Qty: 14 TAB | Refills: 0 | Status: SHIPPED | OUTPATIENT
Start: 2020-01-02 | End: 2020-01-22

## 2020-01-02 NOTE — PROGRESS NOTES
"Subjective:     Chief Complaint   Patient presents with   • Foot Swelling     left foot, Couple weeks, some bilsters,pervius foot injury        Amelia Love is a 34 y.o. female here today for evaluation and management of:    Nail deformity  Patient injured her toe years ago and developed a blood blister.  Since then she is has injured it multiple times and now has stopped developed redness and swelling that worsens at nighttime.  She developed blisters on another toe after wearing boots when her feet were swollen.  She denies any fever or chills.  No nausea or vomiting.       Allergies   Allergen Reactions   • Minocycline    • Tetracycline        Current medicines (including changes today)  Current Outpatient Medications   Medication Sig Dispense Refill   • amoxicillin-clavulanate (AUGMENTIN) 875-125 MG Tab Take 1 Tab by mouth 2 times a day. 14 Tab 0   • levonorgestrel-ethinyl estradiol (SEASONALE) 0.15-0.03 MG per tablet Take 1 Tab by mouth every day. 28 Tab 5     No current facility-administered medications for this visit.        She  has a past medical history of Seasonal allergic rhinitis (11/15/2013).    Patient Active Problem List    Diagnosis Date Noted   • Cellulitis of left lower extremity 01/02/2020   • Nail deformity 01/02/2020   • Dyslipidemia 10/10/2019   • Encounter for surveillance of contraceptive pills 03/13/2019   • Family history of hypothyroidism 08/14/2018   • ENRRIQUE (obstructive sleep apnea) 05/02/2018   • Subclinical hypothyroidism 08/15/2017   • Abnormal uterine bleeding 11/18/2016   • Multiple nevi 11/18/2016       ROS   No fever or chills.  No nausea or vomiting.  No chest pain or palpitations.  No cough or SOB.  No pain with urination or hematuria.  No black or bloody stools.       Objective:     /82 (BP Location: Right arm, Patient Position: Sitting, BP Cuff Size: Adult)   Pulse 74   Temp (!) 32.2 °C (89.9 °F) (Temporal)   Resp 19   Ht 1.778 m (5' 10\")   Wt 64.4 kg (142 " lb)   SpO2 99%  Body mass index is 20.37 kg/m².   Physical Exam:  Well developed, well nourished.  Alert, oriented in no acute distress.  Eye contact is good, speech goal directed, affect calm  Eyes: conjunctiva non-injected, sclera non-icteric.  Lungs: clear to auscultation bilaterally with good excursion. No wheezes or rhonchi  CV: regular rate and rhythm. No murmur  Left great toenail yellow thickened with deformity.  There is surrounding erythema and induration.  It is warm to touch.  There is no fluctuance present.  There is also erythema and blistering on the fourth toe at the dorsum.  No lymphatic streaking        Assessment and Plan:   The following treatment plan was discussed    1. Nail deformity  Refer to podiatry for further evaluation and treatment  - REFERRAL TO PODIATRY    2. Cellulitis of left lower extremity  Start Augmentin 875 twice a day for 7 days.  Increase fluids and rest.  Warm soaks of the area.  Call if not improving  - amoxicillin-clavulanate (AUGMENTIN) 875-125 MG Tab; Take 1 Tab by mouth 2 times a day.  Dispense: 14 Tab; Refill: 0    Any change or worsening of signs or symptoms, patient encouraged to follow-up or report to the emergency room for further evaluation. Patient understands and agrees.    Followup: Return if symptoms worsen or fail to improve.

## 2020-01-02 NOTE — ASSESSMENT & PLAN NOTE
Patient injured her toe years ago and developed a blood blister.  Since then she is has injured it multiple times and now has stopped developed redness and swelling that worsens at nighttime.  She developed blisters on another toe after wearing boots when her feet were swollen.  She denies any fever or chills.  No nausea or vomiting.

## 2020-01-02 NOTE — PATIENT INSTRUCTIONS
Cellulitis, Adult  Cellulitis is a skin infection. The infected area is usually red and tender. This condition occurs most often in the arms and lower legs. The infection can travel to the muscles, blood, and underlying tissue and become serious. It is very important to get treated for this condition.  What are the causes?  Cellulitis is caused by bacteria. The bacteria enter through a break in the skin, such as a cut, burn, insect bite, open sore, or crack.  What increases the risk?  This condition is more likely to occur in people who:  · Have a weak defense system (immune system).  · Have open wounds on the skin such as cuts, burns, bites, and scrapes. Bacteria can enter the body through these open wounds.  · Are older.  · Have diabetes.  · Have a type of long-lasting (chronic) liver disease (cirrhosis) or kidney disease.  · Use IV drugs.  What are the signs or symptoms?  Symptoms of this condition include:  · Redness, streaking, or spotting on the skin.  · Swollen area of the skin.  · Tenderness or pain when an area of the skin is touched.  · Warm skin.  · Fever.  · Chills.  · Blisters.  How is this diagnosed?  This condition is diagnosed based on a medical history and physical exam. You may also have tests, including:  · Blood tests.  · Lab tests.  · Imaging tests.  How is this treated?  Treatment for this condition may include:  · Medicines, such as antibiotic medicines or antihistamines.  · Supportive care, such as rest and application of cold or warm cloths (cold or warm compresses) to the skin.  · Hospital care, if the condition is severe.  The infection usually gets better within 1-2 days of treatment.  Follow these instructions at home:  · Take over-the-counter and prescription medicines only as told by your health care provider.  · If you were prescribed an antibiotic medicine, take it as told by your health care provider. Do not stop taking the antibiotic even if you start to feel better.  · Drink  enough fluid to keep your urine clear or pale yellow.  · Do not touch or rub the infected area.  · Raise (elevate) the infected area above the level of your heart while you are sitting or lying down.  · Apply warm or cold compresses to the affected area as told by your health care provider.  · Keep all follow-up visits as told by your health care provider. This is important. These visits let your health care provider make sure a more serious infection is not developing.  Contact a health care provider if:  · You have a fever.  · Your symptoms do not improve within 1-2 days of starting treatment.  · Your bone or joint underneath the infected area becomes painful after the skin has healed.  · Your infection returns in the same area or another area.  · You notice a swollen bump in the infected area.  · You develop new symptoms.  · You have a general ill feeling (malaise) with muscle aches and pains.  Get help right away if:  · Your symptoms get worse.  · You feel very sleepy.  · You develop vomiting or diarrhea that persists.  · You notice red streaks coming from the infected area.  · Your red area gets larger or turns dark in color.  This information is not intended to replace advice given to you by your health care provider. Make sure you discuss any questions you have with your health care provider.  Document Released: 09/27/2006 Document Revised: 04/27/2017 Document Reviewed: 10/26/2016  [employer] Interactive Patient Education © 2017 [employer]

## 2020-01-07 ENCOUNTER — HOSPITAL ENCOUNTER (OUTPATIENT)
Dept: RADIOLOGY | Facility: MEDICAL CENTER | Age: 35
End: 2020-01-07
Attending: FAMILY MEDICINE
Payer: COMMERCIAL

## 2020-01-07 ENCOUNTER — PATIENT MESSAGE (OUTPATIENT)
Dept: MEDICAL GROUP | Facility: LAB | Age: 35
End: 2020-01-07

## 2020-01-07 DIAGNOSIS — R22.42 LOCALIZED SWELLING OF LEFT LOWER EXTREMITY: ICD-10-CM

## 2020-01-07 PROCEDURE — 93971 EXTREMITY STUDY: CPT | Mod: LT

## 2020-01-09 DIAGNOSIS — Z30.41 ENCOUNTER FOR SURVEILLANCE OF CONTRACEPTIVE PILLS: ICD-10-CM

## 2020-01-09 RX ORDER — LEVONORGESTREL AND ETHINYL ESTRADIOL 0.15-0.03
1 KIT ORAL DAILY
Qty: 28 TAB | Refills: 5 | Status: SHIPPED | OUTPATIENT
Start: 2020-01-09 | End: 2020-03-31 | Stop reason: SDUPTHER

## 2020-01-09 NOTE — TELEPHONE ENCOUNTER
Was the patient seen in the last year in this department? Yes  1/2/20  Does patient have an active prescription for medications requested? No     Received Request Via: Pharmacy

## 2020-01-10 ENCOUNTER — HOSPITAL ENCOUNTER (OUTPATIENT)
Dept: LAB | Facility: MEDICAL CENTER | Age: 35
End: 2020-01-10
Attending: NURSE PRACTITIONER
Payer: COMMERCIAL

## 2020-01-10 ENCOUNTER — OFFICE VISIT (OUTPATIENT)
Dept: MEDICAL GROUP | Facility: LAB | Age: 35
End: 2020-01-10
Payer: COMMERCIAL

## 2020-01-10 VITALS
OXYGEN SATURATION: 95 % | TEMPERATURE: 98 F | SYSTOLIC BLOOD PRESSURE: 118 MMHG | DIASTOLIC BLOOD PRESSURE: 70 MMHG | RESPIRATION RATE: 14 BRPM | WEIGHT: 143 LBS | HEIGHT: 70 IN | BODY MASS INDEX: 20.47 KG/M2 | HEART RATE: 95 BPM

## 2020-01-10 DIAGNOSIS — L60.8 NAIL DEFORMITY: ICD-10-CM

## 2020-01-10 DIAGNOSIS — M25.572 ARTHRALGIA OF LEFT FOOT: ICD-10-CM

## 2020-01-10 LAB
CRP SERPL HS-MCNC: 0.11 MG/DL (ref 0–0.75)
ERYTHROCYTE [SEDIMENTATION RATE] IN BLOOD BY WESTERGREN METHOD: 2 MM/HOUR (ref 0–20)
RHEUMATOID FACT SER IA-ACNC: <10 IU/ML (ref 0–14)

## 2020-01-10 PROCEDURE — 36415 COLL VENOUS BLD VENIPUNCTURE: CPT

## 2020-01-10 PROCEDURE — 85652 RBC SED RATE AUTOMATED: CPT

## 2020-01-10 PROCEDURE — 86431 RHEUMATOID FACTOR QUANT: CPT

## 2020-01-10 PROCEDURE — 86140 C-REACTIVE PROTEIN: CPT

## 2020-01-10 PROCEDURE — 86200 CCP ANTIBODY: CPT

## 2020-01-10 PROCEDURE — 99213 OFFICE O/P EST LOW 20 MIN: CPT | Performed by: NURSE PRACTITIONER

## 2020-01-10 ASSESSMENT — PATIENT HEALTH QUESTIONNAIRE - PHQ9: CLINICAL INTERPRETATION OF PHQ2 SCORE: 0

## 2020-01-10 NOTE — PROGRESS NOTES
"CC:Diagnoses of Arthralgia of left foot and Nail deformity were pertinent to this visit.    HISTORY OF PRESENT ILLNESS: Amelia Love is a 34 y.o. female established patient who presents today to discuss the following problems:    Amelia is a 34-year-old female who is a patient of Dr. Laura Esquivel who is here today for follow-up up on recent visit with Dr. Esquivel related to toe cellulitis.  Patient reports that she has finished the dose of antibiotics and there is no improvement in her swelling and redness that happens at night in her left foot.  She reports this has been going on for several months and has brought in a picture at night where she is standing and it is quite notable that her left foot is very erythematous/edematous on her right foot is her normal skin tone.  She reports that it is not painful however it is bothersome because it swollen and it feels \"hot\".  She was given a referral to podiatry by Dr. Esquivel at her last visit and she is scheduled to follow-up with them next week related to her toenail deformity.  She does report a family history of rheumatoid arthritis and multiple members.  She did have a negative DVT ultrasound study of that extremity.      Health Maintenance: Completed    Patient Active Problem List    Diagnosis Date Noted   • Cellulitis of left lower extremity 01/02/2020   • Nail deformity 01/02/2020   • Dyslipidemia 10/10/2019   • Encounter for surveillance of contraceptive pills 03/13/2019   • Family history of hypothyroidism 08/14/2018   • ENRRIQUE (obstructive sleep apnea) 05/02/2018   • Subclinical hypothyroidism 08/15/2017   • Abnormal uterine bleeding 11/18/2016   • Multiple nevi 11/18/2016        Allergies:Minocycline and Tetracycline    Current Outpatient Medications   Medication Sig Dispense Refill   • levonorgestrel-ethinyl estradiol (SEASONALE) 0.15-0.03 MG per tablet Take 1 Tab by mouth every day. 28 Tab 5   • amoxicillin-clavulanate (AUGMENTIN) 875-125 MG Tab Take 1 " Tab by mouth 2 times a day. (Patient not taking: Reported on 1/10/2020) 14 Tab 0     No current facility-administered medications for this visit.        Social History     Tobacco Use   • Smoking status: Never Smoker   • Smokeless tobacco: Never Used   Substance Use Topics   • Alcohol use: Yes     Alcohol/week: 0.5 oz     Types: 1 Glasses of wine per week     Comment: occasional   • Drug use: No     Social History     Patient does not qualify to have social determinant information on file (likely too young).   Social History Narrative    Lives with her partner Willy for 1 y.    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       Family History   Problem Relation Age of Onset   • Hypertension Father    • Hyperlipidemia Mother    • Other Mother 45        MS    • Thyroid Sister    • Cancer Maternal Grandmother 50        breast   • Thyroid Maternal Grandmother    • Allergies Sister    • Thyroid Maternal Aunt    • Alcohol/Drug Neg Hx    • Diabetes Neg Hx    • Heart Disease Neg Hx    • Psychiatric Illness Neg Hx        ROS:     Constitutional:  Negative for fever, chills, unexpected weight change, and fatigue/generalized weakness.  HEENT:  Negative for headaches, vision changes, hearing changes, ear pain, ear discharge, rhinorrhea, sinus congestion, sore throat, and neck pain.    Respiratory: Negative for cough, sputum production, chest congestion, dyspnea, wheezing, and crackles.    Cardiovascular:Negative for chest pain, palpitations, orthopnea, and bilateral lower extremity edema.   Gastrointestinal:Negative for heartburn, nausea, vomiting, abdominal pain, hematochezia, melena, diarrhea, constipation, and greasy/foul-smelling stools.   Genitourinary: Negative for dysuria, polyuria, hematuria, pyuria, urinary urgency, and urinary incontinence.   Musculoskeletal:Negative for myalgias, back pain, and joint pain.   Skin: Negative for rash, itching, cyanotic skin color change.   Neurological: Negative for  "dizziness, tingling, tremors, focal sensory deficit, focal weakness and headaches.   Endo/Heme/Allergies: Does not bruise/bleed easily.   Psychiatric/Behavioral: Negative for depression, suicidal/homicidal ideation and memory loss.        EXAM:   /70   Pulse 95   Temp 36.7 °C (98 °F)   Resp 14   Ht 1.778 m (5' 10\")   Wt 64.9 kg (143 lb)   SpO2 95%  Body mass index is 20.52 kg/m².    Constitutional: Well-developed and well-nourished. Not diaphoretic. No distress.   Skin: Skin is warm and dry. No rash noted.  Head: Atraumatic without lesions.  Neck: Supple, trachea midline. No thyromegaly present. No cervical or supraclavicular lymphadenopathy.  Cardiovascular: Regular rate and rhythm.   Chest: Effort normal. Clear to auscultation throughout. No adventitious sounds.   Abdomen: Soft, non tender, and without distention. Active bowel sounds in all four quadrants. No rebound, guarding, masses or hepatosplenomegaly.  Extremities: No cyanosis, clubbing, erythema, nor edema.  Left great toenail yellow thickened with deformity.  There is NO surrounding erythema and induration  Neurological: Alert and oriented x 3. Sensation intact.   Psychiatric:  Behavior, mood, and affect are appropriate.      ASSESSMENT/PLAN:    1. Arthralgia of left foot  R/o Rheumatoid arthritis.  - WESTERGREN SED RATE; Future  - RHEUMATOID ARTHRITIS FACTOR; Future  - CRP QUANTITIVE (NON-CARDIAC); Future  - CCP ANTIBODY; Future    2. Nail Bed deformity  Patient has appt with rheumatology and will follow up with them as scheduled.     Return if symptoms worsen or fail to improve.       Please note that this dictation was created using voice recognition software. I have made every reasonable attempt to correct obvious errors, but I expect that there are errors of grammar and possibly content that I did not discover before finalizing the note.  "

## 2020-01-12 LAB — CCP IGG SERPL-ACNC: 2 UNITS (ref 0–19)

## 2020-01-21 ENCOUNTER — PATIENT MESSAGE (OUTPATIENT)
Dept: MEDICAL GROUP | Facility: LAB | Age: 35
End: 2020-01-21

## 2020-01-21 DIAGNOSIS — R22.42 LOCALIZED SWELLING OF LEFT LOWER EXTREMITY: ICD-10-CM

## 2020-01-22 DIAGNOSIS — R22.42 LOCALIZED SWELLING OF LEFT LOWER EXTREMITY: ICD-10-CM

## 2020-01-22 RX ORDER — LIDOCAINE 50 MG/G
1 PATCH TOPICAL EVERY 24 HOURS
Qty: 10 PATCH | Refills: 1 | Status: SHIPPED | OUTPATIENT
Start: 2020-01-22 | End: 2020-08-06

## 2020-01-23 ENCOUNTER — HOSPITAL ENCOUNTER (OUTPATIENT)
Dept: LAB | Facility: MEDICAL CENTER | Age: 35
End: 2020-01-23
Attending: FAMILY MEDICINE
Payer: COMMERCIAL

## 2020-01-23 DIAGNOSIS — R22.42 LOCALIZED SWELLING OF LEFT LOWER EXTREMITY: ICD-10-CM

## 2020-01-23 LAB
BASOPHILS # BLD AUTO: 0.5 % (ref 0–1.8)
BASOPHILS # BLD: 0.03 K/UL (ref 0–0.12)
EOSINOPHIL # BLD AUTO: 0.05 K/UL (ref 0–0.51)
EOSINOPHIL NFR BLD: 0.8 % (ref 0–6.9)
ERYTHROCYTE [DISTWIDTH] IN BLOOD BY AUTOMATED COUNT: 37.4 FL (ref 35.9–50)
HCT VFR BLD AUTO: 40.9 % (ref 37–47)
HGB BLD-MCNC: 13.9 G/DL (ref 12–16)
IMM GRANULOCYTES # BLD AUTO: 0.01 K/UL (ref 0–0.11)
IMM GRANULOCYTES NFR BLD AUTO: 0.2 % (ref 0–0.9)
LYMPHOCYTES # BLD AUTO: 1.62 K/UL (ref 1–4.8)
LYMPHOCYTES NFR BLD: 26.3 % (ref 22–41)
MCH RBC QN AUTO: 30.5 PG (ref 27–33)
MCHC RBC AUTO-ENTMCNC: 34 G/DL (ref 33.6–35)
MCV RBC AUTO: 89.7 FL (ref 81.4–97.8)
MONOCYTES # BLD AUTO: 0.37 K/UL (ref 0–0.85)
MONOCYTES NFR BLD AUTO: 6 % (ref 0–13.4)
NEUTROPHILS # BLD AUTO: 4.08 K/UL (ref 2–7.15)
NEUTROPHILS NFR BLD: 66.2 % (ref 44–72)
NRBC # BLD AUTO: 0 K/UL
NRBC BLD-RTO: 0 /100 WBC
PLATELET # BLD AUTO: 198 K/UL (ref 164–446)
PMV BLD AUTO: 11.8 FL (ref 9–12.9)
RBC # BLD AUTO: 4.56 M/UL (ref 4.2–5.4)
WBC # BLD AUTO: 6.2 K/UL (ref 4.8–10.8)

## 2020-01-23 PROCEDURE — 85025 COMPLETE CBC W/AUTO DIFF WBC: CPT

## 2020-01-23 PROCEDURE — 36415 COLL VENOUS BLD VENIPUNCTURE: CPT

## 2020-02-03 ENCOUNTER — PATIENT MESSAGE (OUTPATIENT)
Dept: MEDICAL GROUP | Facility: LAB | Age: 35
End: 2020-02-03

## 2020-02-11 ENCOUNTER — PATIENT MESSAGE (OUTPATIENT)
Dept: MEDICAL GROUP | Facility: LAB | Age: 35
End: 2020-02-11

## 2020-02-11 DIAGNOSIS — I73.81 ERYTHROMELALGIA (HCC): ICD-10-CM

## 2020-02-11 DIAGNOSIS — R22.42 LOCALIZED SWELLING OF LEFT LOWER EXTREMITY: ICD-10-CM

## 2020-03-09 ENCOUNTER — HOSPITAL ENCOUNTER (OUTPATIENT)
Dept: RADIOLOGY | Facility: MEDICAL CENTER | Age: 35
End: 2020-03-09
Attending: SURGERY
Payer: COMMERCIAL

## 2020-03-09 DIAGNOSIS — M79.605 PAIN OF LEFT LOWER EXTREMITY: ICD-10-CM

## 2020-03-09 PROCEDURE — 93971 EXTREMITY STUDY: CPT | Mod: 26 | Performed by: INTERNAL MEDICINE

## 2020-03-09 PROCEDURE — 93971 EXTREMITY STUDY: CPT | Mod: LT

## 2020-03-31 DIAGNOSIS — Z30.40 CONTRACEPTIVE SURVEILLANCE: ICD-10-CM

## 2020-04-01 RX ORDER — LEVONORGESTREL AND ETHINYL ESTRADIOL 0.15-0.03
1 KIT ORAL DAILY
Qty: 84 TAB | Refills: 3 | Status: SHIPPED | OUTPATIENT
Start: 2020-04-01 | End: 2021-03-11

## 2020-05-27 ENCOUNTER — HOSPITAL ENCOUNTER (OUTPATIENT)
Dept: RADIOLOGY | Facility: MEDICAL CENTER | Age: 35
End: 2020-05-27
Attending: ORTHOPAEDIC SURGERY
Payer: COMMERCIAL

## 2020-05-27 DIAGNOSIS — I73.9 PERIPHERAL VASCULAR DISEASE, UNSPECIFIED (HCC): ICD-10-CM

## 2020-05-27 DIAGNOSIS — G57.62 MORTON'S NEUROMA, LEFT: ICD-10-CM

## 2020-05-27 DIAGNOSIS — M79.672 LEFT FOOT PAIN: ICD-10-CM

## 2020-05-27 PROCEDURE — 93922 UPR/L XTREMITY ART 2 LEVELS: CPT

## 2020-05-27 PROCEDURE — 93922 UPR/L XTREMITY ART 2 LEVELS: CPT | Mod: 26 | Performed by: INTERNAL MEDICINE

## 2020-06-19 ENCOUNTER — HOSPITAL ENCOUNTER (OUTPATIENT)
Dept: RADIOLOGY | Facility: MEDICAL CENTER | Age: 35
End: 2020-06-19
Attending: ORTHOPAEDIC SURGERY
Payer: COMMERCIAL

## 2020-06-19 DIAGNOSIS — M79.672 LEFT FOOT PAIN: ICD-10-CM

## 2020-06-19 DIAGNOSIS — G57.62 MORTON'S NEUROMA, LEFT: ICD-10-CM

## 2020-06-19 PROCEDURE — A9576 INJ PROHANCE MULTIPACK: HCPCS

## 2020-06-19 PROCEDURE — 700117 HCHG RX CONTRAST REV CODE 255

## 2020-06-19 PROCEDURE — 73720 MRI LWR EXTREMITY W/O&W/DYE: CPT | Mod: LT

## 2020-06-19 RX ADMIN — GADOTERIDOL 13 ML: 279.3 INJECTION, SOLUTION INTRAVENOUS at 09:26

## 2020-08-06 ENCOUNTER — OFFICE VISIT (OUTPATIENT)
Dept: VASCULAR LAB | Facility: MEDICAL CENTER | Age: 35
End: 2020-08-06
Attending: FAMILY MEDICINE
Payer: COMMERCIAL

## 2020-08-06 VITALS
HEIGHT: 71 IN | DIASTOLIC BLOOD PRESSURE: 79 MMHG | WEIGHT: 148.3 LBS | BODY MASS INDEX: 20.76 KG/M2 | SYSTOLIC BLOOD PRESSURE: 121 MMHG | HEART RATE: 81 BPM

## 2020-08-06 DIAGNOSIS — I77.6 SMALL VESSEL VASCULITIS (HCC): ICD-10-CM

## 2020-08-06 DIAGNOSIS — I73.9 PERIPHERAL VASCULAR DISEASE (HCC): ICD-10-CM

## 2020-08-06 DIAGNOSIS — R93.7 BONE MARROW EDEMA: ICD-10-CM

## 2020-08-06 DIAGNOSIS — R93.89 ABNORMAL MRI: ICD-10-CM

## 2020-08-06 PROCEDURE — 99212 OFFICE O/P EST SF 10 MIN: CPT | Performed by: FAMILY MEDICINE

## 2020-08-06 PROCEDURE — 99204 OFFICE O/P NEW MOD 45 MIN: CPT | Performed by: FAMILY MEDICINE

## 2020-08-06 ASSESSMENT — ENCOUNTER SYMPTOMS
COUGH: 0
FOCAL WEAKNESS: 0
DEPRESSION: 0
BLURRED VISION: 0
DIZZINESS: 0
CHILLS: 0
MYALGIAS: 0
BLOOD IN STOOL: 0
HEMOPTYSIS: 0
ABDOMINAL PAIN: 0
PALPITATIONS: 0
ORTHOPNEA: 0
WHEEZING: 0
DOUBLE VISION: 0
SHORTNESS OF BREATH: 0
NERVOUS/ANXIOUS: 0
TREMORS: 0
HEADACHES: 0
BRUISES/BLEEDS EASILY: 0
NAUSEA: 0
FEVER: 0
WEAKNESS: 0
DIARRHEA: 0
SORE THROAT: 0
SEIZURES: 0
INSOMNIA: 0
VOMITING: 0

## 2020-08-06 ASSESSMENT — FIBROSIS 4 INDEX: FIB4 SCORE: 0.59

## 2020-08-06 NOTE — PROGRESS NOTES
INITIAL VASCULAR VISIT  Subjective:   Amelia Love is a 35 y.o. y.o. female  who presents today 8/6/20 for   Chief Complaint   Patient presents with   • New Patient     Peripheral vascular disease     initially referred by Dr. Boss (ortho) for eval for vascular disease in left foot     HPI:    Abnormal Left foot MRI:   Has recent evaluation with Dr. Boss (foot ortho) due to chronic left forefoot pain.  (requested notes for review).   Reports after standing for a while, gets hot sensation at big toe and dorsum of distal foot, then gets redness.    See patient's write up on symptoms (scanned into media).     Last year, 11-12/2019 developed recurrent redness at distal left foot.  Had been tx for cellulitis with augmentin - did not help.  Had normal LLE venous duplex and DENIZ normal earlier this year.  More recently MRI completed showing marrow edema with ddx including raynaud's and vasculitis.  Prior eval with Dr. Middleton (vasc surg, notes reviewed) in 3/2020 indicated no surgically interventions indicated  Prior labs for rheum conditions negative, CRP normal.  CBC has been normal.    HTN:  No prior dx or antiHTN meds.     Hyperlipidemia:    Mild high LDL, no prior med therapy  No ascvd.     Antiplatelet/anticoagulation:   No, no bleeding noted     Type 2 DM:  No     CKD:    No     Hypothyroidism:   No     Clinical evidence of ASCVD:    1) hx of MI or other ACS:  no  2) coronary or other revasc procedure: no  3) TIA/ischemic CVA: no  4) AS-related PAD (including DENIZ <0.9): no  5) other AS diseases (renal AS, AA due to AS, carotid plaque >50% stenosis): no  6) evidence of AS from imaging (angiography, stress tests, CAC >300 or >74%ile for age/gender): no    Major ASCVD risk factors:    1) Age (Men>44, women>54):  no   2) Fhx early CHD (MI, SCD, coronary revasc procedures in men <55, women <65):  no   3) cigarette smoking:   reports that she has never smoked. She has never used smokeless tobacco.   4) high  BP >139/89 or on tx: no   5) Low HDL-C (<40 men, <50 women): yes   HDL   Date Value Ref Range Status   11/21/2019 47 >=40 mg/dL Final       Past Medical History:   Diagnosis Date   • Seasonal allergic rhinitis 11/15/2013     Past Surgical History:   Procedure Laterality Date   • DENTAL EXTRACTION(S)        Current Outpatient Medications on File Prior to Visit   Medication Sig Dispense Refill   • VITAMIN D PO Take  by mouth.     • Calcium-Magnesium-Vitamin D 600-300-400 Liquid Take  by mouth.     • aspirin EC (ECOTRIN) 325 MG Tablet Delayed Response Take 1 Tab by mouth every day. 60 Tab    • levonorgestrel-ethinyl estradiol (SEASONALE) 0.15-0.03 MG per tablet Take 1 Tab by mouth every day. 84 Tab 3     No current facility-administered medications on file prior to visit.      Allergies   Allergen Reactions   • Minocycline    • Tetracycline        Family History   Problem Relation Age of Onset   • Hypertension Father    • Hyperlipidemia Mother    • Other Mother 45        MS    • Thyroid Sister    • Cancer Maternal Grandmother 50        breast   • Thyroid Maternal Grandmother    • Allergies Sister    • Thyroid Maternal Aunt    • Other Maternal Aunt         Raynaud's    • Alcohol/Drug Neg Hx    • Diabetes Neg Hx    • Heart Disease Neg Hx    • Psychiatric Illness Neg Hx    • Clotting Disorder Neg Hx         Social History     Tobacco Use   • Smoking status: Never Smoker   • Smokeless tobacco: Never Used   Substance Use Topics   • Alcohol use: Yes     Alcohol/week: 0.5 oz     Types: 1 Glasses of wine per week     Comment: occasional   • Drug use: No     DIET AND EXERCISE:  Weight Change:stable   BMI Readings from Last 5 Encounters:   08/06/20 20.68 kg/m²   01/10/20 20.52 kg/m²   01/02/20 20.37 kg/m²   10/10/19 20.52 kg/m²   03/20/19 21.20 kg/m²      Diet: common adult  Exercise: minimal exercise   Review of Systems   Constitutional: Negative for chills, fever and malaise/fatigue.   HENT: Negative for nosebleeds, sore  "throat and tinnitus.    Eyes: Negative for blurred vision and double vision.   Respiratory: Negative for cough, hemoptysis, shortness of breath and wheezing.    Cardiovascular: Negative for chest pain, palpitations, orthopnea and leg swelling.   Gastrointestinal: Negative for abdominal pain, blood in stool, diarrhea, melena, nausea and vomiting.   Genitourinary: Negative for hematuria.   Musculoskeletal: Negative for joint pain and myalgias.   Skin: Negative for itching and rash.   Neurological: Negative for dizziness, tremors, focal weakness, seizures, weakness and headaches.   Endo/Heme/Allergies: Does not bruise/bleed easily.   Psychiatric/Behavioral: Negative for depression. The patient is not nervous/anxious and does not have insomnia.       Objective:     Vitals:    08/06/20 1327   BP: 121/79   BP Location: Left arm   Patient Position: Sitting   BP Cuff Size: Adult   Pulse: 81   Weight: 67.3 kg (148 lb 4.8 oz)   Height: 1.803 m (5' 11\")      BP Readings from Last 5 Encounters:   08/06/20 121/79   01/10/20 118/70   01/02/20 116/82   10/10/19 112/80   03/20/19 114/70      Body mass index is 20.68 kg/m².  Physical Exam  Vitals signs reviewed.   Constitutional:       Appearance: Normal appearance.   HENT:      Head: Normocephalic and atraumatic.      Nose: Nose normal.      Mouth/Throat:      Mouth: Mucous membranes are moist.      Pharynx: Oropharynx is clear.   Eyes:      Extraocular Movements: Extraocular movements intact.      Conjunctiva/sclera: Conjunctivae normal.   Neck:      Musculoskeletal: Normal range of motion and neck supple.   Cardiovascular:      Rate and Rhythm: Normal rate and regular rhythm.      Pulses: Normal pulses.           Carotid pulses are 2+ on the right side and 2+ on the left side.       Radial pulses are 2+ on the right side and 2+ on the left side.        Dorsalis pedis pulses are 2+ on the right side and 2+ on the left side.        Posterior tibial pulses are 2+ on the right side " Bladder non-tender and non-distended. and 2+ on the left side.      Heart sounds: Normal heart sounds.      Comments:    Spider telangectasia:       RLE:  None      LLE: none   Varicosities:           RLE: none      LLE: none   Corona phlebectatica:      RLE:  None        LLE:  None   Cording:         RLE:  None     LLE: None     Pulmonary:      Effort: Pulmonary effort is normal.      Breath sounds: Normal breath sounds.   Abdominal:      General: Abdomen is flat. Bowel sounds are normal.      Palpations: Abdomen is soft.   Musculoskeletal:      Right lower leg: No edema.      Left lower leg: No edema.   Skin:     General: Skin is warm and dry.      Capillary Refill: Capillary refill takes less than 2 seconds.      Coloration: Skin is not ashen, cyanotic, mottled or pale.      Findings: No erythema, lesion, petechiae or rash. Rash is not scaling or vesicular.      Nails: There is no clubbing.     Neurological:      General: No focal deficit present.      Mental Status: She is alert and oriented to person, place, and time. Mental status is at baseline.   Psychiatric:         Mood and Affect: Mood normal.         Behavior: Behavior normal.       Lab Results   Component Value Date    CHOLSTRLTOT 171 11/21/2019     (H) 11/21/2019    HDL 47 11/21/2019    TRIGLYCERIDE 80 11/21/2019             Lab Results   Component Value Date    SODIUM 139 02/04/2019    POTASSIUM 3.8 02/04/2019    CHLORIDE 106 02/04/2019    CO2 25 02/04/2019    GLUCOSE 95 02/04/2019    BUN 10 02/04/2019    CREATININE 0.97 02/04/2019    IFAFRICA >60 02/04/2019    IFNOTAFR >60 02/04/2019        Lab Results   Component Value Date    WBC 6.2 01/23/2020    RBC 4.56 01/23/2020    HEMOGLOBIN 13.9 01/23/2020    HEMATOCRIT 40.9 01/23/2020    MCV 89.7 01/23/2020    MCH 30.5 01/23/2020    MCHC 34.0 01/23/2020    MPV 11.8 01/23/2020      Ref. Range 6/20/2019 10:34   Ferritin Latest Ref Range: 10.0 - 291.0 ng/mL 32.4   TSH Latest Ref Range: 0.380 - 5.330 uIU/mL 1.730   Free T-4 Latest Ref Range:  0.53 - 1.43 ng/dL 1.24      Ref. Range 1/10/2020 15:07   Rheumatoid Factor -Neph- Latest Ref Range: 0 - 14 IU/mL <10   Stat C-Reactive Protein Latest Ref Range: 0.00 - 0.75 mg/dL 0.11   Ccp Antibodies Latest Ref Range: 0 - 19 Units 2     VASCULAR IMAGING:   Last EKG: No results found for this or any previous visit.    LLE venous 1/7/20  No evidence of left lower extremity deep venous thrombosis.    LLE venous 3/9/20   Normal left lower extremity superficial and deep venous examination.    No evidence of superficial or deep venous reflux.    DENIZ 5/27/20                  RIGHT      Waveform            Systolic BPs (mmHg)                              115           Brachial   Triphasic                                Common Femoral   Triphasic                  151           Posterior Tibial   Triphasic                  155           Dorsalis Pedis                                            Peroneal                              1.27          DENIZ                                            TBI                           LEFT   Waveform        Systolic BPs (mmHg)                              122           Brachial   Triphasic                                Common Femoral   Triphasic                  149           Posterior Tibial   Triphasic                  146           Dorsalis Pedis                                            Peroneal                              1.22          DENIZ                                              TBI    Left MRI foot 6/19/20  1.  Intense bone marrow edema with enhancement throughout the 1st distal phalanx, 2nd distal phalanx, and 3rd distal phalanx.   2.  Most likely/common etiology would be of edema from overuse/altered biomechanics. If that does not correlate with the history than other potential etiologies including severe Raynaud's phenomenon, vasculitis or thermal injury could be considered.   3.  1st through 5th metatarsophalangeal joint effusion and 1st and 2nd interphalangeal joint  effusion   4.  Inflammatory arthritis should also be considered within the differential diagnosis   5.  No evidence for neuroma     Medical Decision Making:  Today's Assessment / Status / Plan:     1. Bone marrow edema  Anti-Neutrophil Cytoplasmic Antibodies Screen    C-REACTIVE PROTEIN, QUANT C    CRYOGLOBULIN QL SERUM RFLX    PIETER REFLEXIVE PROFILE    CBC WITH DIFFERENTIAL    Sed Rate    Comp Metabolic Panel    URIC ACID    SPEP W/REFLEX TO EDWIN, A, G, M    VITAMIN D,25 HYDROXY   2. Peripheral vascular disease (HCC)  Anti-Neutrophil Cytoplasmic Antibodies Screen    C-REACTIVE PROTEIN, QUANT C    CRYOGLOBULIN QL SERUM RFLX    PIETER REFLEXIVE PROFILE    CBC WITH DIFFERENTIAL    Sed Rate    Comp Metabolic Panel    URIC ACID    SPEP W/REFLEX TO EDWIN, A, G, M   3. Abnormal MRI  Anti-Neutrophil Cytoplasmic Antibodies Screen    C-REACTIVE PROTEIN, QUANT C    CRYOGLOBULIN QL SERUM RFLX    PIETER REFLEXIVE PROFILE    CBC WITH DIFFERENTIAL    Sed Rate    Comp Metabolic Panel    URIC ACID    SPEP W/REFLEX TO EDWIN, A, G, M    VITAMIN D,25 HYDROXY   4. Small vessel vasculitis (HCC)  Anti-Neutrophil Cytoplasmic Antibodies Screen    C-REACTIVE PROTEIN, QUANT C    CRYOGLOBULIN QL SERUM RFLX    PIETER REFLEXIVE PROFILE    CBC WITH DIFFERENTIAL    Sed Rate    Comp Metabolic Panel    URIC ACID    SPEP W/REFLEX TO EDWIN, A, G, M     Patient Type: Primary Prevention    Etiology of Established CVD if Present:   None     Antithrombotic therapy:  Indication: possible small vessel vasculitis vs remote chance of erythromelalgia   Anti-Platelet/Anti-Coagulant Tx: yes  - start ASA 325mg daily for now, monitor response.      Lipid Management: Qualifies for Statin Therapy Based on 2018 ACC/AHA Guidelines: no  Calculated 10-Year Risk of ASCVD (if LDL <189, no CKD G3b/4/5): low  Currently on Statin: No  Low:  0-1 major ASCVD risk factors, consider other risk indicators  Tx threshold:  non-HDL-C >189, LDL-C >159  Tx goal:  non-HDL <130, LDL-C <100 (optional  apoB<90)  At goal:  no  Plan:   - reinforced ongoing TLC measures   - lab surveillance annually, defer to PCP     Blood Pressure Management:Goal: ACC/AHA (2017) goal <130/80  Home BP at goal: yes  Office BP at goal:  yes  Echo: n/a  UACR: n/a   Plan:   Monitoring:   - start/continue home BP monitoring, reviewed correct technique:  Yes   - order 24h ABPM:  NO  Medications: no meds indicated at this time     Glycemic Status: Normal    Smoking:   reports that she has never smoked. She has never used smokeless tobacco.   - continued complete avoidance of all tobacco products     Physical Activity: continue healthy activity to improve CV fitness, see care instructions for additional details     Weight Management and Nutrition: Dietary plan was discussed with patient at this visit including DASH, low sodium and/or as outlined in care instructions     Other:   1) recurrent Left foot erythema/swelling with intensive bone marrow edema on MRI  Unclear etiology, unusual presentation with complicated DDX.   Ddx: erythromelalgia (EM) (primary vs secondary), bone marrow edema syndrome (BMES) of foot, uncommon small vessel vasculitis, CRPS, trauma, low prob infection, low prob rheum disorders, osteomyelitis unlikely based upon MRI and prior abx treatment w/o relief and no prior fractures/foot ulcers, etc   EM can be associated with myeloprolif syndrome but CBC w/ diff has been normal.  No other identified rheum or neurologic issues.  Mother has MS.    BMES is common in females in her age arange, usu unilateral - unknown pathogenesis, usually this is self-limited to 3 to 9 months with spontaneous regression   Large vessel disease precluded based upon normal DENIZ and venous duplex.  Does not fit traditional pattern of Raynaud's or other vasospastic disorders.   Low prob primary vasculitis but will complete further assessment   Majority of rheum labs normal, but PIETER not tested yet.    CRP has been normal   Important characteristics  include isolated to L foot, no symptoms with cold exposure, improves with cold compresses.  Largely erythema with swelling.  No other focal neuro s/s.   Completely normal foot exam today   - for EM:  identify triggers and avoid, nonpharm measures:  Cooling, elevation, fan   - trial of aspirin 325mg daily for 1 month and monitor - this is primary therapy for EM  - could consider any number of additional therapies based upon case reports if truly is EM  - may need neurology eval for NCS/EMG assessment   For BMES: will trial offloading, aspirin, other options include nifedpine, bisphosphonate with Vit D, in some cases iloprost has been used   - check additional labs to eval for vasculitis, cryoglob  - consider repeat MRI in future, o/w no specific vasc imaging recommended     Instructed to follow-up with PCP for remainder of adult medical needs: yes  We will partner with other providers in the management of established vascular disease and cardiometabolic risk factors.    Studies to Be Obtained: repeat MRI foot at next visit   Labs to Be Obtained: as noted above     Follow up in: 1 month    Darshan Marcelino M.D.  [facility]   [facility]

## 2020-08-25 ENCOUNTER — HOSPITAL ENCOUNTER (OUTPATIENT)
Dept: LAB | Facility: MEDICAL CENTER | Age: 35
End: 2020-08-25
Attending: FAMILY MEDICINE
Payer: COMMERCIAL

## 2020-08-25 DIAGNOSIS — R93.89 ABNORMAL MRI: ICD-10-CM

## 2020-08-25 DIAGNOSIS — R93.7 BONE MARROW EDEMA: ICD-10-CM

## 2020-08-25 DIAGNOSIS — I73.9 PERIPHERAL VASCULAR DISEASE (HCC): ICD-10-CM

## 2020-08-25 DIAGNOSIS — I77.6 SMALL VESSEL VASCULITIS (HCC): ICD-10-CM

## 2020-08-25 LAB
25(OH)D3 SERPL-MCNC: 39 NG/ML (ref 30–100)
ALBUMIN SERPL BCP-MCNC: 4.3 G/DL (ref 3.2–4.9)
ALBUMIN/GLOB SERPL: 1.5 G/DL
ALP SERPL-CCNC: 45 U/L (ref 30–99)
ALT SERPL-CCNC: 12 U/L (ref 2–50)
ANION GAP SERPL CALC-SCNC: 12 MMOL/L (ref 7–16)
AST SERPL-CCNC: 13 U/L (ref 12–45)
BASOPHILS # BLD AUTO: 0.7 % (ref 0–1.8)
BASOPHILS # BLD: 0.04 K/UL (ref 0–0.12)
BILIRUB SERPL-MCNC: 0.5 MG/DL (ref 0.1–1.5)
BUN SERPL-MCNC: 10 MG/DL (ref 8–22)
CALCIUM SERPL-MCNC: 9.3 MG/DL (ref 8.5–10.5)
CHLORIDE SERPL-SCNC: 103 MMOL/L (ref 96–112)
CO2 SERPL-SCNC: 22 MMOL/L (ref 20–33)
CREAT SERPL-MCNC: 0.8 MG/DL (ref 0.5–1.4)
CRP SERPL HS-MCNC: 0.12 MG/DL (ref 0–0.75)
EOSINOPHIL # BLD AUTO: 0.11 K/UL (ref 0–0.51)
EOSINOPHIL NFR BLD: 1.9 % (ref 0–6.9)
ERYTHROCYTE [DISTWIDTH] IN BLOOD BY AUTOMATED COUNT: 38.7 FL (ref 35.9–50)
ERYTHROCYTE [SEDIMENTATION RATE] IN BLOOD BY WESTERGREN METHOD: 1 MM/HOUR (ref 0–20)
FASTING STATUS PATIENT QL REPORTED: NORMAL
GLOBULIN SER CALC-MCNC: 2.8 G/DL (ref 1.9–3.5)
GLUCOSE SERPL-MCNC: 99 MG/DL (ref 65–99)
HCT VFR BLD AUTO: 46.8 % (ref 37–47)
HGB BLD-MCNC: 15.6 G/DL (ref 12–16)
IMM GRANULOCYTES # BLD AUTO: 0.01 K/UL (ref 0–0.11)
IMM GRANULOCYTES NFR BLD AUTO: 0.2 % (ref 0–0.9)
LYMPHOCYTES # BLD AUTO: 1.88 K/UL (ref 1–4.8)
LYMPHOCYTES NFR BLD: 32.4 % (ref 22–41)
MCH RBC QN AUTO: 30.3 PG (ref 27–33)
MCHC RBC AUTO-ENTMCNC: 33.3 G/DL (ref 33.6–35)
MCV RBC AUTO: 90.9 FL (ref 81.4–97.8)
MONOCYTES # BLD AUTO: 0.33 K/UL (ref 0–0.85)
MONOCYTES NFR BLD AUTO: 5.7 % (ref 0–13.4)
NEUTROPHILS # BLD AUTO: 3.44 K/UL (ref 2–7.15)
NEUTROPHILS NFR BLD: 59.1 % (ref 44–72)
NRBC # BLD AUTO: 0 K/UL
NRBC BLD-RTO: 0 /100 WBC
PLATELET # BLD AUTO: 207 K/UL (ref 164–446)
PMV BLD AUTO: 11.7 FL (ref 9–12.9)
POTASSIUM SERPL-SCNC: 4 MMOL/L (ref 3.6–5.5)
PROT SERPL-MCNC: 7.1 G/DL (ref 6–8.2)
RBC # BLD AUTO: 5.15 M/UL (ref 4.2–5.4)
SODIUM SERPL-SCNC: 137 MMOL/L (ref 135–145)
URATE SERPL-MCNC: 3.6 MG/DL (ref 1.9–8.2)
WBC # BLD AUTO: 5.8 K/UL (ref 4.8–10.8)

## 2020-08-25 PROCEDURE — 85025 COMPLETE CBC W/AUTO DIFF WBC: CPT

## 2020-08-25 PROCEDURE — 86140 C-REACTIVE PROTEIN: CPT

## 2020-08-25 PROCEDURE — 82306 VITAMIN D 25 HYDROXY: CPT

## 2020-08-25 PROCEDURE — 36415 COLL VENOUS BLD VENIPUNCTURE: CPT

## 2020-08-25 PROCEDURE — 86255 FLUORESCENT ANTIBODY SCREEN: CPT

## 2020-08-25 PROCEDURE — 84165 PROTEIN E-PHORESIS SERUM: CPT

## 2020-08-25 PROCEDURE — 85652 RBC SED RATE AUTOMATED: CPT

## 2020-08-25 PROCEDURE — 84550 ASSAY OF BLOOD/URIC ACID: CPT

## 2020-08-25 PROCEDURE — 84155 ASSAY OF PROTEIN SERUM: CPT

## 2020-08-25 PROCEDURE — 80053 COMPREHEN METABOLIC PANEL: CPT

## 2020-08-25 PROCEDURE — 82595 ASSAY OF CRYOGLOBULIN: CPT

## 2020-08-25 PROCEDURE — 86038 ANTINUCLEAR ANTIBODIES: CPT

## 2020-08-25 SDOH — ECONOMIC STABILITY: TRANSPORTATION INSECURITY
IN THE PAST 12 MONTHS, HAS LACK OF RELIABLE TRANSPORTATION KEPT YOU FROM MEDICAL APPOINTMENTS, MEETINGS, WORK OR FROM GETTING THINGS NEEDED FOR DAILY LIVING?: NO

## 2020-08-25 SDOH — ECONOMIC STABILITY: HOUSING INSECURITY: IN THE LAST 12 MONTHS, HOW MANY PLACES HAVE YOU LIVED?: 1

## 2020-08-25 SDOH — HEALTH STABILITY: PHYSICAL HEALTH: ON AVERAGE, HOW MANY DAYS PER WEEK DO YOU ENGAGE IN MODERATE TO STRENUOUS EXERCISE (LIKE A BRISK WALK)?: 4 DAYS

## 2020-08-25 SDOH — ECONOMIC STABILITY: HOUSING INSECURITY
IN THE LAST 12 MONTHS, WAS THERE A TIME WHEN YOU DID NOT HAVE A STEADY PLACE TO SLEEP OR SLEPT IN A SHELTER (INCLUDING NOW)?: NO

## 2020-08-25 SDOH — HEALTH STABILITY: PHYSICAL HEALTH: ON AVERAGE, HOW MANY MINUTES DO YOU ENGAGE IN EXERCISE AT THIS LEVEL?: 30 MINUTES

## 2020-08-25 SDOH — HEALTH STABILITY: MENTAL HEALTH
STRESS IS WHEN SOMEONE FEELS TENSE, NERVOUS, ANXIOUS, OR CAN'T SLEEP AT NIGHT BECAUSE THEIR MIND IS TROUBLED. HOW STRESSED ARE YOU?: NOT AT ALL

## 2020-08-25 SDOH — ECONOMIC STABILITY: TRANSPORTATION INSECURITY
IN THE PAST 12 MONTHS, HAS THE LACK OF TRANSPORTATION KEPT YOU FROM MEDICAL APPOINTMENTS OR FROM GETTING MEDICATIONS?: NO

## 2020-08-25 SDOH — ECONOMIC STABILITY: INCOME INSECURITY: IN THE LAST 12 MONTHS, WAS THERE A TIME WHEN YOU WERE NOT ABLE TO PAY THE MORTGAGE OR RENT ON TIME?: NO

## 2020-08-25 ASSESSMENT — SOCIAL DETERMINANTS OF HEALTH (SDOH)
HOW OFTEN DO YOU ATTENT MEETINGS OF THE CLUB OR ORGANIZATION YOU BELONG TO?: NEVER
HOW OFTEN DO YOU GET TOGETHER WITH FRIENDS OR RELATIVES?: NEVER
HOW OFTEN DO YOU HAVE A DRINK CONTAINING ALCOHOL: 2-3 TIMES A WEEK
HOW OFTEN DO YOU HAVE SIX OR MORE DRINKS ON ONE OCCASION: NEVER
HOW OFTEN DO YOU ATTEND CHURCH OR RELIGIOUS SERVICES?: NEVER
HOW MANY DRINKS CONTAINING ALCOHOL DO YOU HAVE ON A TYPICAL DAY WHEN YOU ARE DRINKING: 1 OR 2
WITHIN THE PAST 12 MONTHS, THE FOOD YOU BOUGHT JUST DIDN'T LAST AND YOU DIDN'T HAVE MONEY TO GET MORE: NEVER TRUE
IN A TYPICAL WEEK, HOW MANY TIMES DO YOU TALK ON THE PHONE WITH FAMILY, FRIENDS, OR NEIGHBORS?: TWICE A WEEK
HOW HARD IS IT FOR YOU TO PAY FOR THE VERY BASICS LIKE FOOD, HOUSING, MEDICAL CARE, AND HEATING?: NOT VERY HARD
DO YOU BELONG TO ANY CLUBS OR ORGANIZATIONS SUCH AS CHURCH GROUPS UNIONS, FRATERNAL OR ATHLETIC GROUPS, OR SCHOOL GROUPS?: NO
WITHIN THE PAST 12 MONTHS, YOU WORRIED THAT YOUR FOOD WOULD RUN OUT BEFORE YOU GOT THE MONEY TO BUY MORE: NEVER TRUE

## 2020-08-27 LAB
ANCA IGG TITR SER IF: NORMAL {TITER}
NUCLEAR IGG SER QL IA: NORMAL

## 2020-08-29 LAB
ALBUMIN SERPL ELPH-MCNC: 4.08 G/DL (ref 3.75–5.01)
ALPHA1 GLOB SERPL ELPH-MCNC: 0.31 G/DL (ref 0.19–0.46)
ALPHA2 GLOB SERPL ELPH-MCNC: 0.65 G/DL (ref 0.48–1.05)
B-GLOBULIN SERPL ELPH-MCNC: 0.74 G/DL (ref 0.48–1.1)
GAMMA GLOB SERPL ELPH-MCNC: 0.92 G/DL (ref 0.62–1.51)
INTERPRETATION SERPL IFE-IMP: NORMAL
MONOCLON BAND OBS SERPL: NORMAL
PATHOLOGY STUDY: NORMAL
PROT SERPL-MCNC: 6.7 G/DL (ref 6.3–8.2)

## 2020-08-30 LAB — CRYOGLOB SER QL 3D COLD INC: NORMAL

## 2020-08-31 ENCOUNTER — HOSPITAL ENCOUNTER (OUTPATIENT)
Facility: MEDICAL CENTER | Age: 35
End: 2020-08-31
Attending: FAMILY MEDICINE
Payer: COMMERCIAL

## 2020-08-31 ENCOUNTER — OFFICE VISIT (OUTPATIENT)
Dept: MEDICAL GROUP | Facility: LAB | Age: 35
End: 2020-08-31
Payer: COMMERCIAL

## 2020-08-31 VITALS
HEART RATE: 85 BPM | WEIGHT: 149 LBS | BODY MASS INDEX: 20.86 KG/M2 | DIASTOLIC BLOOD PRESSURE: 70 MMHG | OXYGEN SATURATION: 97 % | RESPIRATION RATE: 16 BRPM | HEIGHT: 71 IN | TEMPERATURE: 98.6 F | SYSTOLIC BLOOD PRESSURE: 110 MMHG

## 2020-08-31 DIAGNOSIS — Z12.4 SCREENING FOR CERVICAL CANCER: ICD-10-CM

## 2020-08-31 DIAGNOSIS — Z01.419 WELL WOMAN EXAM WITH ROUTINE GYNECOLOGICAL EXAM: ICD-10-CM

## 2020-08-31 PROCEDURE — 88175 CYTOPATH C/V AUTO FLUID REDO: CPT

## 2020-08-31 PROCEDURE — 99395 PREV VISIT EST AGE 18-39: CPT | Performed by: FAMILY MEDICINE

## 2020-08-31 PROCEDURE — 87624 HPV HI-RISK TYP POOLED RSLT: CPT

## 2020-08-31 ASSESSMENT — FIBROSIS 4 INDEX: FIB4 SCORE: 0.63

## 2020-08-31 NOTE — PROGRESS NOTES
SUBJECTIVE:   Chief Complaint   Patient presents with   • Gynecologic Exam     annual       35 y.o. female for annual routine gynecologic exam    OB History    Para Term  AB Living   0 0 0 0 0 0   SAB TAB Ectopic Molar Multiple Live Births   0 0 0 0 0 0      Social History     Substance and Sexual Activity   Sexual Activity Yes   • Partners: Male   • Birth control/protection: Pill         H/O Abnormal Pap yes - 2 Pap's ago but it was normal last time  She is on 84-day birth control pill but is having some intermittent spotting.  She prefers to spotting to her periods because she gets severe cramps with menses..  No significant bloating/fluid retention, pelvic pain, or dyspareunia. No vaginal discharge   No breast tenderness, mass, nipple discharge, changes in size or contour, or abnormal cyclic discomfort.        ROS:    No urinary tract symptoms, no incontinence.   No abdominal pain, change in bowel habits, black or bloody stools.    No unusual headaches, no visual changes, menstrual migraines   No prolonged cough. No dyspnea or chest pain on exertion.  No depression, labile mood, anxiety ,libido changes, insomnia.  No new/concerning skin lesions,        Social History     Tobacco Use   • Smoking status: Never Smoker   • Smokeless tobacco: Never Used   Substance Use Topics   • Alcohol use: Yes     Alcohol/week: 0.5 oz     Types: 1 Glasses of wine per week     Frequency: 2-3 times a week     Drinks per session: 1 or 2     Binge frequency: Never     Comment: occasional   • Drug use: No       Patient Active Problem List    Diagnosis Date Noted   • Cellulitis of left lower extremity 2020   • Nail deformity 2020   • Dyslipidemia 10/10/2019   • Encounter for surveillance of contraceptive pills 2019   • Family history of hypothyroidism 2018   • ENRRIQUE (obstructive sleep apnea) 2018   • Subclinical hypothyroidism 08/15/2017   • Abnormal uterine bleeding 2016   • Multiple nevi  "11/18/2016       Past Medical History:   Diagnosis Date   • Seasonal allergic rhinitis 11/15/2013     Past Surgical History:   Procedure Laterality Date   • DENTAL EXTRACTION(S)           Family History   Problem Relation Age of Onset   • Hypertension Father    • Hyperlipidemia Mother    • Other Mother 45        MS    • Thyroid Sister    • Cancer Maternal Grandmother 50        breast   • Thyroid Maternal Grandmother    • Allergies Sister    • Thyroid Maternal Aunt    • Other Maternal Aunt         Raynaud's    • Alcohol/Drug Neg Hx    • Diabetes Neg Hx    • Heart Disease Neg Hx    • Psychiatric Illness Neg Hx    • Clotting Disorder Neg Hx      Family Status   Relation Name Status   • Fa  Alive   • Mo  Alive   • Sis  Alive   • Sis  Alive   • MGMo  (Not Specified)   • Sis  (Not Specified)   • MAunt  Alive   • Neg Hx  (Not Specified)         Current medicines (including changes today)  Current Outpatient Medications   Medication Sig Dispense Refill   • VITAMIN D PO Take  by mouth.     • Calcium-Magnesium-Vitamin D 600-300-400 Liquid Take  by mouth.     • aspirin EC (ECOTRIN) 325 MG Tablet Delayed Response Take 1 Tab by mouth every day. 60 Tab    • levonorgestrel-ethinyl estradiol (SEASONALE) 0.15-0.03 MG per tablet Take 1 Tab by mouth every day. 84 Tab 3     No current facility-administered medications for this visit.            Allergies: Minocycline and Tetracycline     Preventive Care:  Health Maintenance Topics with due status: Overdue       Topic Date Due    PAP SMEAR 08/11/2020     Health Maintenance Topics with due status: Due Soon       Topic Date Due    IMM INFLUENZA 09/01/2020       OBJECTIVE:   /70 (BP Location: Right arm, Patient Position: Sitting, BP Cuff Size: Adult)   Pulse 85   Temp 37 °C (98.6 °F) (Temporal)   Resp 16   Ht 1.803 m (5' 11\")   Wt 67.6 kg (149 lb)   LMP 06/29/2020 (Approximate) Comment: using birth control  SpO2 97%   BMI 20.78 kg/m²   Body mass index is 20.78 " kg/m².      Gen: Well developed, well nourished in no acute distress.   Skin: Pink, warm, and dry. No rashes  Eyes: conjunctiva non-injected, sclera non-icteric. EOMs intact.   Nasal mucosa without edema nor erythema. No facial tenderness  Ears:Pinna normal. TM pearly gray.   Neck: Supple, trachea midline. No adenopathy or masses in the neck or supraclavicular regions. No thyromegaly.  Lungs: Effort is normal. Clear to auscultation bilaterally with good excursion.  CV: regular rate and rhythm. No murmur.  Abdomen: soft, nontender, + BS. No HSM.  No CVAT  Ext: no edema, color normal, vascularity normal, temperature normal  Alert and oriented Eye contact is good, speech goal directed, affect calm     Breast Exam: Performed with instruction during examination. No axillary lymphadenopathy, no skin changes, no dominant masses. No nipple retraction  Pelvic Exam:  Normal external genitalia with no lesions. Normal vaginal mucosa with normal rugation and no discharge. Cervix with no visible lesions. No cervical motion tenderness. Uterus is normal sized with no masses. No adnexal tenderness or enlargement appreciated. Pap is obtained and the specimen was sent to lab  .    <ASSESSMENT and PLAN>  1. Well woman exam with routine gynecological exam     2. Screening for cervical cancer  THINPREP PAP WITH HPV       Discussed  breast self exam, mammography screening, adequate intake of calcium and vitamin D, diet and exercise     Return in about 1 year (around 8/31/2021).

## 2020-09-01 LAB
CYTOLOGY REG CYTOL: ABNORMAL
HPV HR 12 DNA CVX QL NAA+PROBE: POSITIVE
HPV16 DNA SPEC QL NAA+PROBE: NEGATIVE
HPV18 DNA SPEC QL NAA+PROBE: NEGATIVE
SPECIMEN SOURCE: ABNORMAL

## 2020-09-10 ENCOUNTER — OFFICE VISIT (OUTPATIENT)
Dept: VASCULAR LAB | Facility: MEDICAL CENTER | Age: 35
End: 2020-09-10
Attending: FAMILY MEDICINE
Payer: COMMERCIAL

## 2020-09-10 VITALS
WEIGHT: 149.6 LBS | HEIGHT: 71 IN | DIASTOLIC BLOOD PRESSURE: 81 MMHG | BODY MASS INDEX: 20.94 KG/M2 | SYSTOLIC BLOOD PRESSURE: 124 MMHG | HEART RATE: 86 BPM

## 2020-09-10 DIAGNOSIS — R93.7 BONE MARROW EDEMA: ICD-10-CM

## 2020-09-10 DIAGNOSIS — R93.89 ABNORMAL MRI: ICD-10-CM

## 2020-09-10 DIAGNOSIS — R25.3 MUSCLE TWITCHING: ICD-10-CM

## 2020-09-10 DIAGNOSIS — L53.9 ERYTHEMA OF FOOT: ICD-10-CM

## 2020-09-10 PROCEDURE — 99212 OFFICE O/P EST SF 10 MIN: CPT | Performed by: FAMILY MEDICINE

## 2020-09-10 PROCEDURE — 99214 OFFICE O/P EST MOD 30 MIN: CPT | Performed by: FAMILY MEDICINE

## 2020-09-10 RX ORDER — NIFEDIPINE 30 MG
30 TABLET, EXTENDED RELEASE ORAL DAILY
Qty: 90 TAB | Refills: 0 | Status: SHIPPED | OUTPATIENT
Start: 2020-09-10 | End: 2020-09-25

## 2020-09-10 ASSESSMENT — ENCOUNTER SYMPTOMS
FOCAL WEAKNESS: 0
ABDOMINAL PAIN: 0
HEADACHES: 0
SORE THROAT: 0
SHORTNESS OF BREATH: 0
BLOOD IN STOOL: 0
DEPRESSION: 0
WEAKNESS: 0
INSOMNIA: 0
TREMORS: 0
MYALGIAS: 0
NAUSEA: 0
DOUBLE VISION: 0
DIARRHEA: 0
CHILLS: 0
BLURRED VISION: 0
BRUISES/BLEEDS EASILY: 0
HEMOPTYSIS: 0
SEIZURES: 0
VOMITING: 0
PALPITATIONS: 0
COUGH: 0
DIZZINESS: 0
NERVOUS/ANXIOUS: 0
FEVER: 0
WHEEZING: 0
ORTHOPNEA: 0

## 2020-09-10 ASSESSMENT — FIBROSIS 4 INDEX: FIB4 SCORE: 0.63

## 2020-09-10 NOTE — PROGRESS NOTES
FOLLOW-UP VASCULAR VISIT  Subjective:   Amelia Love is a 35 y.o. y.o. female  who presents today 09/10/20  for   Chief Complaint   Patient presents with   • Follow-Up     vasculitis, edema, L foot     initially referred by Dr. Boss (ortho) for eval for vascular disease in left foot     HPI:    Abnormal Left foot MRI:   Reports less frequency of needing to ice foot to 6x over last 1 month.  Prior was 3-4x/week.   Still having hot, tightness, redness, itching, swelling.  Mostly after taking shower.    Triggering events often when standing still doing dishes in evening and sometimes when lying flat.     Reports after standing for a while, gets hot sensation at big toe and dorsum of distal foot, then gets redness.    See patient's write up on symptoms (scanned into media).     Last year, 11-12/2019 developed recurrent redness at distal left foot.  Had been tx for cellulitis with augmentin - did not help.  Had normal LLE venous duplex and DENIZ normal earlier this year.  More recently MRI completed showing marrow edema with ddx including raynaud's and vasculitis.  Prior eval with Dr. Middleton (vasc surg, notes reviewed) in 3/2020 indicated no surgically interventions indicated  Prior labs for rheum conditions negative, CRP normal.  CBC has been normal.    HTN:  No prior dx or antiHTN meds.     Hyperlipidemia:    Mild high LDL, no prior med therapy  No ascvd.     Antiplatelet/anticoagulation:   ASA 325mg daily, no bleeding     Clinical evidence of ASCVD:  NONE     Major ASCVD risk factors:    1) Age (Men>44, women>54):  no   2) Fhx early CHD (MI, SCD, coronary revasc procedures in men <55, women <65):  no   3) cigarette smoking:   reports that she has never smoked. She has never used smokeless tobacco.   4) high BP >139/89 or on tx: no   5) Low HDL-C (<40 men, <50 women): yes   HDL   Date Value Ref Range Status   11/21/2019 47 >=40 mg/dL Final      Current Outpatient Medications on File Prior to Visit    Medication Sig Dispense Refill   • VITAMIN D PO Take  by mouth.     • Calcium-Magnesium-Vitamin D 600-300-400 Liquid Take  by mouth.     • aspirin EC (ECOTRIN) 325 MG Tablet Delayed Response Take 1 Tab by mouth every day. 60 Tab    • levonorgestrel-ethinyl estradiol (SEASONALE) 0.15-0.03 MG per tablet Take 1 Tab by mouth every day. 84 Tab 3     No current facility-administered medications on file prior to visit.      Allergies   Allergen Reactions   • Minocycline    • Tetracycline      Social History     Tobacco Use   • Smoking status: Never Smoker   • Smokeless tobacco: Never Used   Substance Use Topics   • Alcohol use: Yes     Alcohol/week: 0.5 oz     Types: 1 Glasses of wine per week     Frequency: 2-3 times a week     Drinks per session: 1 or 2     Binge frequency: Never     Comment: occasional   • Drug use: No     DIET AND EXERCISE:  Weight Change:stable   BMI Readings from Last 5 Encounters:   09/10/20 20.86 kg/m²   08/31/20 20.78 kg/m²   08/06/20 20.68 kg/m²   01/10/20 20.52 kg/m²   01/02/20 20.37 kg/m²      Diet: common adult  Exercise: minimal exercise   Review of Systems   Constitutional: Negative for chills, fever and malaise/fatigue.   HENT: Negative for nosebleeds, sore throat and tinnitus.    Eyes: Negative for blurred vision and double vision.   Respiratory: Negative for cough, hemoptysis, shortness of breath and wheezing.    Cardiovascular: Negative for chest pain, palpitations, orthopnea and leg swelling.   Gastrointestinal: Negative for abdominal pain, blood in stool, diarrhea, melena, nausea and vomiting.   Genitourinary: Negative for hematuria.   Musculoskeletal: Negative for joint pain and myalgias.   Skin: Negative for itching and rash.   Neurological: Negative for dizziness, tremors, focal weakness, seizures, weakness and headaches.   Endo/Heme/Allergies: Does not bruise/bleed easily.   Psychiatric/Behavioral: Negative for depression. The patient is not nervous/anxious and does not have  "insomnia.       Objective:     Vitals:    09/10/20 0951   BP: 124/81   BP Location: Left arm   Patient Position: Sitting   BP Cuff Size: Adult   Pulse: 86   Weight: 67.9 kg (149 lb 9.6 oz)   Height: 1.803 m (5' 11\")      BP Readings from Last 5 Encounters:   09/10/20 124/81   08/31/20 110/70   08/06/20 121/79   01/10/20 118/70   01/02/20 116/82      Body mass index is 20.86 kg/m².  Physical Exam  Vitals signs reviewed.   Constitutional:       Appearance: Normal appearance.   HENT:      Head: Normocephalic and atraumatic.      Nose: Nose normal.      Mouth/Throat:      Mouth: Mucous membranes are moist.      Pharynx: Oropharynx is clear.   Eyes:      Extraocular Movements: Extraocular movements intact.      Conjunctiva/sclera: Conjunctivae normal.   Neck:      Musculoskeletal: Normal range of motion and neck supple.   Cardiovascular:      Rate and Rhythm: Normal rate and regular rhythm.      Pulses: Normal pulses.           Carotid pulses are 2+ on the right side and 2+ on the left side.       Radial pulses are 2+ on the right side and 2+ on the left side.        Dorsalis pedis pulses are 2+ on the right side and 2+ on the left side.        Posterior tibial pulses are 2+ on the right side and 2+ on the left side.      Heart sounds: Normal heart sounds.      Comments:    Spider telangectasia:       RLE:  None      LLE: none   Varicosities:           RLE: none      LLE: none   Corona phlebectatica:      RLE:  None        LLE:  None   Cording:         RLE:  None     LLE: None     Pulmonary:      Effort: Pulmonary effort is normal.      Breath sounds: Normal breath sounds.   Abdominal:      General: Abdomen is flat. Bowel sounds are normal.      Palpations: Abdomen is soft.   Musculoskeletal:      Right lower leg: No edema.      Left lower leg: No edema.   Skin:     General: Skin is warm and dry.      Capillary Refill: Capillary refill takes less than 2 seconds.      Coloration: Skin is not ashen, cyanotic, mottled or " pale.      Findings: No erythema, lesion, petechiae or rash. Rash is not scaling or vesicular.      Nails: There is no clubbing.     Neurological:      General: No focal deficit present.      Mental Status: She is alert and oriented to person, place, and time. Mental status is at baseline.   Psychiatric:         Mood and Affect: Mood normal.         Behavior: Behavior normal.       Lab Results   Component Value Date    CHOLSTRLTOT 171 11/21/2019     (H) 11/21/2019    HDL 47 11/21/2019    TRIGLYCERIDE 80 11/21/2019             Lab Results   Component Value Date    SODIUM 137 08/25/2020    POTASSIUM 4.0 08/25/2020    CHLORIDE 103 08/25/2020    CO2 22 08/25/2020    GLUCOSE 99 08/25/2020    BUN 10 08/25/2020    CREATININE 0.80 08/25/2020    IFAFRICA >60 08/25/2020    IFNOTAFR >60 08/25/2020        Lab Results   Component Value Date    WBC 5.8 08/25/2020    RBC 5.15 08/25/2020    HEMOGLOBIN 15.6 08/25/2020    HEMATOCRIT 46.8 08/25/2020    MCV 90.9 08/25/2020    MCH 30.3 08/25/2020    MCHC 33.3 (L) 08/25/2020    MPV 11.7 08/25/2020      Ref. Range 6/20/2019 10:34   Ferritin Latest Ref Range: 10.0 - 291.0 ng/mL 32.4   TSH Latest Ref Range: 0.380 - 5.330 uIU/mL 1.730   Free T-4 Latest Ref Range: 0.53 - 1.43 ng/dL 1.24      Ref. Range 1/10/2020 15:07   Rheumatoid Factor -Neph- Latest Ref Range: 0 - 14 IU/mL <10   Stat C-Reactive Protein Latest Ref Range: 0.00 - 0.75 mg/dL 0.11   Ccp Antibodies Latest Ref Range: 0 - 19 Units 2      Ref. Range 8/25/2020 06:18   Sed Rate Westergren Latest Ref Range: 0 - 20 mm/hour 1      Ref. Range 8/25/2020 06:18   Antinuclear Antibody Latest Ref Range: None Detected  None Detected   ANCA IgG Latest Ref Range: <1:20  <1:20   EDWIN Reflex Unknown Not Done   Interpretation Unknown See Note   Albumin Latest Ref Range: 3.75 - 5.01 g/dL 4.08   Gamma Globulin Latest Ref Range: 0.62 - 1.51 g/dL 0.92   Alpha-1 Globulin Latest Ref Range: 0.19 - 0.46 g/dL 0.31   Alpha-2 Globulin Latest Ref Range:  0.48 - 1.05 g/dL 0.65   Beta Globulin Latest Ref Range: 0.48 - 1.10 g/dL 0.74      Ref. Range 8/25/2020 06:18   Cryoglobulin QL Latest Ref Range: NEG 72Hour  NEG 72Hour     VASCULAR IMAGING:   Last EKG: No results found for this or any previous visit.    LLE venous 1/7/20  No evidence of left lower extremity deep venous thrombosis.    LLE venous 3/9/20   Normal left lower extremity superficial and deep venous examination.    No evidence of superficial or deep venous reflux.    DENIZ 5/27/20                  RIGHT      Waveform            Systolic BPs (mmHg)                              115           Brachial   Triphasic                                Common Femoral   Triphasic                  151           Posterior Tibial   Triphasic                  155           Dorsalis Pedis                                            Peroneal                              1.27          DENIZ                                            TBI                           LEFT   Waveform        Systolic BPs (mmHg)                              122           Brachial   Triphasic                                Common Femoral   Triphasic                  149           Posterior Tibial   Triphasic                  146           Dorsalis Pedis                                            Peroneal                              1.22          DENIZ                                              TBI    Left MRI foot 6/19/20  1.  Intense bone marrow edema with enhancement throughout the 1st distal phalanx, 2nd distal phalanx, and 3rd distal phalanx.   2.  Most likely/common etiology would be of edema from overuse/altered biomechanics. If that does not correlate with the history than other potential etiologies including severe Raynaud's phenomenon, vasculitis or thermal injury could be considered.   3.  1st through 5th metatarsophalangeal joint effusion and 1st and 2nd interphalangeal joint effusion   4.  Inflammatory arthritis should also be considered  within the differential diagnosis   5.  No evidence for neuroma     Medical Decision Making:  Today's Assessment / Status / Plan:     1. Bone marrow edema  REFERRAL TO NEUROLOGY    MR-FOOT-WITH & W/O LEFT   2. Abnormal MRI  REFERRAL TO NEUROLOGY    MR-FOOT-WITH & W/O LEFT   3. Erythema of foot  REFERRAL TO NEUROLOGY    NIFEdipine (ADALAT CC) 30 MG CR tablet    MR-FOOT-WITH & W/O LEFT   4. Muscle twitching  REFERRAL TO NEUROLOGY    MR-FOOT-WITH & W/O LEFT     Patient Type: Primary Prevention    Etiology of Established CVD if Present:   None     Antithrombotic therapy:  Indication: possible small vessel vasculitis vs erythromelalgia   Anti-Platelet/Anti-Coagulant Tx: yes  - continue  ASA 325mg daily for now, monitor response.      Lipid Management: Qualifies for Statin Therapy Based on 2018 ACC/AHA Guidelines: no  Calculated 10-Year Risk of ASCVD (if LDL <189, no CKD G3b/4/5): low  Currently on Statin: No   NLA risk group: Low:  0-1 major ASCVD risk factors, consider other risk indicators   Tx threshold:  non-HDL-C >189, LDL-C >159   Tx goal:  non-HDL <130, LDL-C <100 (optional apoB<90)   At goal:  no  Plan:   - reinforced ongoing TLC measures   - lab surveillance annually, defer to PCP     Blood Pressure Management:Goal: ACC/AHA (2017) goal <130/80   Home BP at goal: yes   Office BP at goal:  yes  Echo: n/a  UACR: n/a   Plan:   Monitoring:   - start/continue home BP monitoring, reviewed correct technique:  Yes   - order 24h ABPM:  NO  Medications: no meds indicated at this time     Glycemic Status: Normal    Smoking:   reports that she has never smoked. She has never used smokeless tobacco.   - continued complete avoidance of all tobacco products     Physical Activity: continue healthy activity to improve CV fitness, see care instructions for additional details     Weight Management and Nutrition: Dietary plan was discussed with patient at this visit including DASH, low sodium and/or as outlined in care instructions      Other:   1) recurrent Left foot erythema/swelling with intensive bone marrow edema on MRI  Unclear etiology, unusual presentation with complicated DDX.   Important characteristics include isolated to L foot (great toe and dorsum, not entire foot), no symptoms with cold exposure, improves with cold compresses.  Largely erythema with swelling.  No other focal neuro s/s.  Slightly improved with ASA   Ddx: erythromelalgia (EM) (primary vs secondary), bone marrow edema syndrome (BMES) of foot, uncommon small vessel vasculitis, CRPS, trauma, low prob infection, low prob rheum disorders, osteomyelitis unlikely based upon MRI and prior abx treatment w/o relief and no prior fractures/foot ulcers, etc   EM can be associated with myeloprolif syndrome but CBC w/ diff has been normal.  No other identified rheum or neurologic issues.  Mother has MS.    BMES is common in females in her age range, usu unilateral - unknown pathogenesis, usually this is self-limited to 3 to 9 months with spontaneous regression   Large vessel disease precluded based upon normal DENIZ and venous duplex.    Does not fit traditional pattern of Raynaud's or other vasospastic disorders.   Low prob primary vasculitis but will complete further assessment   All initial rheum labs normal including CCP ab, PIETER, cryoglob  SPEP, CRP, ESR normal    Completely normal foot exam today   For EM:  identify triggers and avoid, nonpharm measures:  Cooling, elevation, fan   - trial of aspirin 325mg daily for 1 month and monitor - this is primary therapy for EM  - add nifedipine 30mg ER daily to monitor for improvements   - could consider any number of additional therapies based upon case reports if truly is EM  - ref to neurology eval for NCS/EMG assessment   For BMES: will trial offloading, aspirin, bisphosphonate with Vit D, in some cases iloprost has been used   - start nifedipine   - repeat MRI foot for interval changes     Instructed to follow-up with PCP for  remainder of adult medical needs: yes  We will partner with other providers in the management of established vascular disease and cardiometabolic risk factors.    Studies to Be Obtained: left foot MRI w/ and w/o    Labs to Be Obtained: as noted above     Follow up in: 6 weeks with me     Darshan Marcelino M.D.  [facility]   [facility]

## 2020-09-21 ENCOUNTER — TELEPHONE (OUTPATIENT)
Dept: VASCULAR LAB | Facility: MEDICAL CENTER | Age: 35
End: 2020-09-21

## 2020-09-21 NOTE — TELEPHONE ENCOUNTER
Patient called in stating that she started her nifedipine today around 0600. Patient states that her redness and swelling around the ankles worsened shortly after taking the new medication. Spoke with Celestina Hernandez, patient to stop medication for 1 day and see if the symptoms remain the same or improve and follow up in office with Dr. Marcelino Friday. Patient states understanding and is in agreement with plan.

## 2020-09-25 ENCOUNTER — OFFICE VISIT (OUTPATIENT)
Dept: VASCULAR LAB | Facility: MEDICAL CENTER | Age: 35
End: 2020-09-25
Attending: FAMILY MEDICINE
Payer: COMMERCIAL

## 2020-09-25 VITALS
WEIGHT: 150 LBS | BODY MASS INDEX: 21 KG/M2 | SYSTOLIC BLOOD PRESSURE: 177 MMHG | HEIGHT: 71 IN | HEART RATE: 84 BPM | DIASTOLIC BLOOD PRESSURE: 67 MMHG

## 2020-09-25 DIAGNOSIS — R03.0 ELEVATED BLOOD PRESSURE READING WITHOUT DIAGNOSIS OF HYPERTENSION: ICD-10-CM

## 2020-09-25 DIAGNOSIS — R93.89 ABNORMAL MRI: ICD-10-CM

## 2020-09-25 DIAGNOSIS — R93.7 BONE MARROW EDEMA: ICD-10-CM

## 2020-09-25 DIAGNOSIS — L53.9 ERYTHEMA OF FOOT: ICD-10-CM

## 2020-09-25 DIAGNOSIS — R25.3 MUSCLE TWITCHING: ICD-10-CM

## 2020-09-25 PROCEDURE — 99213 OFFICE O/P EST LOW 20 MIN: CPT | Performed by: FAMILY MEDICINE

## 2020-09-25 ASSESSMENT — ENCOUNTER SYMPTOMS
HEADACHES: 0
DIARRHEA: 0
HEMOPTYSIS: 0
SHORTNESS OF BREATH: 0
SEIZURES: 0
NERVOUS/ANXIOUS: 0
BLURRED VISION: 0
BRUISES/BLEEDS EASILY: 0
DEPRESSION: 0
SORE THROAT: 0
COUGH: 0
CHILLS: 0
WHEEZING: 0
BLOOD IN STOOL: 0
WEAKNESS: 0
TREMORS: 0
ABDOMINAL PAIN: 0
DIZZINESS: 0
DOUBLE VISION: 0
PALPITATIONS: 0
FOCAL WEAKNESS: 0
MYALGIAS: 0
NAUSEA: 0
VOMITING: 0
INSOMNIA: 0
ORTHOPNEA: 0
FEVER: 0

## 2020-09-25 ASSESSMENT — FIBROSIS 4 INDEX: FIB4 SCORE: 0.63

## 2020-09-25 NOTE — PROGRESS NOTES
FOLLOW-UP VASCULAR VISIT  Subjective:   Amelia Love is a 35 y.o. y.o. female  who presents today 09/10/20  for   Chief Complaint   Patient presents with   • Follow-Up     NIFEdipine side effect     initially referred by Dr. Boss (ortho) for eval for vascular disease in left foot     HPI:    Abnormal Left foot MRI:   After last visit, trial of nifedipine 30mg ER led to racing heart and felt discomfort in foot.  Had beet red flushing and swelling at the left foot to calf and mild right ankle, so called and informed to stop by pharmD.  Reports no other new changes.  Has not recurred.  Had associated mild dizz and then migraine.   Improved the next day.   Pending repeat MRI foot 10/6  Case reviewed with Dr. Boss (ortho) last week.    Reports less frequency of needing to ice foot to 6x over last 1 month.  Prior was 3-4x/week.   Current episodes include hot, tightness, redness, itching, swelling mostly at dorsum and left great toe area.  Mostly after taking shower.    Triggering events often when standing still doing dishes in evening and sometimes when lying flat.     Reports after standing for a while, gets hot sensation at big toe and dorsum of distal foot, then gets redness.    See patient's write up on symptoms (scanned into media).     Last year, 11-12/2019 developed recurrent redness at distal left foot.  Had been tx for cellulitis with augmentin - did not help.  Had normal LLE venous duplex and DENIZ normal earlier this year.  More recently MRI completed showing marrow edema with ddx including raynaud's and vasculitis.  Prior eval with Dr. Middleton (vasc surg, notes reviewed) in 3/2020 indicated no surgically interventions indicated  Prior labs for rheum conditions negative, CRP normal.  CBC has been normal.    HTN:  No prior dx or antiHTN meds.     Hyperlipidemia:    Mild high LDL, no prior med therapy  No ascvd.     Antiplatelet/anticoagulation:   ASA 325mg daily, no bleeding     Clinical evidence  of ASCVD:  NONE     Major ASCVD risk factors:    1) Age (Men>44, women>54):  no   2) Fhx early CHD (MI, SCD, coronary revasc procedures in men <55, women <65):  no   3) cigarette smoking:   reports that she has never smoked. She has never used smokeless tobacco.   4) high BP >139/89 or on tx: no   5) Low HDL-C (<40 men, <50 women): yes   HDL   Date Value Ref Range Status   11/21/2019 47 >=40 mg/dL Final      Current Outpatient Medications on File Prior to Visit   Medication Sig Dispense Refill   • VITAMIN D PO Take  by mouth.     • Calcium-Magnesium-Vitamin D 600-300-400 Liquid Take  by mouth.     • aspirin EC (ECOTRIN) 325 MG Tablet Delayed Response Take 1 Tab by mouth every day. 60 Tab    • levonorgestrel-ethinyl estradiol (SEASONALE) 0.15-0.03 MG per tablet Take 1 Tab by mouth every day. 84 Tab 3     No current facility-administered medications on file prior to visit.      Allergies   Allergen Reactions   • Minocycline    • Tetracycline      Social History     Tobacco Use   • Smoking status: Never Smoker   • Smokeless tobacco: Never Used   Substance Use Topics   • Alcohol use: Yes     Alcohol/week: 0.5 oz     Types: 1 Glasses of wine per week     Frequency: 2-3 times a week     Drinks per session: 1 or 2     Binge frequency: Never     Comment: occasional   • Drug use: No     DIET AND EXERCISE:  Weight Change:stable   BMI Readings from Last 5 Encounters:   09/25/20 21.00 kg/m²   09/10/20 20.86 kg/m²   08/31/20 20.78 kg/m²   08/06/20 20.68 kg/m²   01/10/20 20.52 kg/m²      Diet: common adult  Exercise: minimal exercise   Review of Systems   Constitutional: Negative for chills, fever and malaise/fatigue.   HENT: Negative for nosebleeds, sore throat and tinnitus.    Eyes: Negative for blurred vision and double vision.   Respiratory: Negative for cough, hemoptysis, shortness of breath and wheezing.    Cardiovascular: Negative for chest pain, palpitations, orthopnea and leg swelling.   Gastrointestinal: Negative for  "abdominal pain, blood in stool, diarrhea, melena, nausea and vomiting.   Genitourinary: Negative for hematuria.   Musculoskeletal: Negative for joint pain and myalgias.   Skin: Negative for itching and rash.   Neurological: Negative for dizziness, tremors, focal weakness, seizures, weakness and headaches.   Endo/Heme/Allergies: Does not bruise/bleed easily.   Psychiatric/Behavioral: Negative for depression. The patient is not nervous/anxious and does not have insomnia.       Objective:     Vitals:    09/25/20 1548   BP: (!) 177/67   BP Location: Left arm   Patient Position: Sitting   BP Cuff Size: Adult   Pulse: 84   Weight: 68 kg (150 lb)   Height: 1.8 m (5' 10.87\")      BP Readings from Last 5 Encounters:   09/25/20 (!) 177/67   09/10/20 124/81   08/31/20 110/70   08/06/20 121/79   01/10/20 118/70      Body mass index is 21 kg/m².  Physical Exam  Vitals signs reviewed.   Constitutional:       Appearance: Normal appearance.   HENT:      Head: Normocephalic and atraumatic.      Nose: Nose normal.      Mouth/Throat:      Mouth: Mucous membranes are moist.      Pharynx: Oropharynx is clear.   Eyes:      Extraocular Movements: Extraocular movements intact.      Conjunctiva/sclera: Conjunctivae normal.   Neck:      Musculoskeletal: Normal range of motion and neck supple.   Cardiovascular:      Rate and Rhythm: Normal rate and regular rhythm.      Pulses: Normal pulses.           Carotid pulses are 2+ on the right side and 2+ on the left side.       Radial pulses are 2+ on the right side and 2+ on the left side.        Dorsalis pedis pulses are 2+ on the right side and 2+ on the left side.        Posterior tibial pulses are 2+ on the right side and 2+ on the left side.      Heart sounds: Normal heart sounds.      Comments:    Spider telangectasia:       RLE:  None      LLE: none   Varicosities:           RLE: none      LLE: none   Corona phlebectatica:      RLE:  None        LLE:  None   Cording:         RLE:  None     " LLE: None     Pulmonary:      Effort: Pulmonary effort is normal.      Breath sounds: Normal breath sounds.   Abdominal:      General: Abdomen is flat. Bowel sounds are normal.      Palpations: Abdomen is soft.   Musculoskeletal:      Right lower leg: No edema.      Left lower leg: No edema.   Skin:     General: Skin is warm and dry.      Capillary Refill: Capillary refill takes less than 2 seconds.      Coloration: Skin is not ashen, cyanotic, mottled or pale.      Findings: No erythema, lesion, petechiae or rash. Rash is not scaling or vesicular.      Nails: There is no clubbing.     Neurological:      General: No focal deficit present.      Mental Status: She is alert and oriented to person, place, and time. Mental status is at baseline.   Psychiatric:         Mood and Affect: Mood normal.         Behavior: Behavior normal.       Lab Results   Component Value Date    CHOLSTRLTOT 171 11/21/2019     (H) 11/21/2019    HDL 47 11/21/2019    TRIGLYCERIDE 80 11/21/2019             Lab Results   Component Value Date    SODIUM 137 08/25/2020    POTASSIUM 4.0 08/25/2020    CHLORIDE 103 08/25/2020    CO2 22 08/25/2020    GLUCOSE 99 08/25/2020    BUN 10 08/25/2020    CREATININE 0.80 08/25/2020    IFAFRICA >60 08/25/2020    IFNOTAFR >60 08/25/2020        Lab Results   Component Value Date    WBC 5.8 08/25/2020    RBC 5.15 08/25/2020    HEMOGLOBIN 15.6 08/25/2020    HEMATOCRIT 46.8 08/25/2020    MCV 90.9 08/25/2020    MCH 30.3 08/25/2020    MCHC 33.3 (L) 08/25/2020    MPV 11.7 08/25/2020      Ref. Range 6/20/2019 10:34   Ferritin Latest Ref Range: 10.0 - 291.0 ng/mL 32.4   TSH Latest Ref Range: 0.380 - 5.330 uIU/mL 1.730   Free T-4 Latest Ref Range: 0.53 - 1.43 ng/dL 1.24      Ref. Range 1/10/2020 15:07   Rheumatoid Factor -Neph- Latest Ref Range: 0 - 14 IU/mL <10   Stat C-Reactive Protein Latest Ref Range: 0.00 - 0.75 mg/dL 0.11   Ccp Antibodies Latest Ref Range: 0 - 19 Units 2      Ref. Range 8/25/2020 06:18   Sed  Rate Westergren Latest Ref Range: 0 - 20 mm/hour 1      Ref. Range 8/25/2020 06:18   Antinuclear Antibody Latest Ref Range: None Detected  None Detected   ANCA IgG Latest Ref Range: <1:20  <1:20   EDWIN Reflex Unknown Not Done   Interpretation Unknown See Note   Albumin Latest Ref Range: 3.75 - 5.01 g/dL 4.08   Gamma Globulin Latest Ref Range: 0.62 - 1.51 g/dL 0.92   Alpha-1 Globulin Latest Ref Range: 0.19 - 0.46 g/dL 0.31   Alpha-2 Globulin Latest Ref Range: 0.48 - 1.05 g/dL 0.65   Beta Globulin Latest Ref Range: 0.48 - 1.10 g/dL 0.74      Ref. Range 8/25/2020 06:18   Cryoglobulin QL Latest Ref Range: NEG 72Hour  NEG 72Hour     VASCULAR IMAGING:   Last EKG: No results found for this or any previous visit.    LLE venous 1/7/20  No evidence of left lower extremity deep venous thrombosis.    LLE venous 3/9/20   Normal left lower extremity superficial and deep venous examination.    No evidence of superficial or deep venous reflux.    DENIZ 5/27/20                  RIGHT      Waveform            Systolic BPs (mmHg)                              115           Brachial   Triphasic                                Common Femoral   Triphasic                  151           Posterior Tibial   Triphasic                  155           Dorsalis Pedis                                            Peroneal                              1.27          DENIZ                                            TBI                           LEFT   Waveform        Systolic BPs (mmHg)                              122           Brachial   Triphasic                             Common Femoral   Triphasic            149           Posterior Tibial   Triphasic            146           Dorsalis Pedis                                            Peroneal                              1.22          DENIZ                                              TBI    Left MRI foot 6/19/20  1.  Intense bone marrow edema with enhancement throughout the 1st distal phalanx, 2nd distal  phalanx, and 3rd distal phalanx.   2.  Most likely/common etiology would be of edema from overuse/altered biomechanics. If that does not correlate with the history than other potential etiologies including severe Raynaud's phenomenon, vasculitis or thermal injury could be considered.   3.  1st through 5th metatarsophalangeal joint effusion and 1st and 2nd interphalangeal joint effusion   4.  Inflammatory arthritis should also be considered within the differential diagnosis   5.  No evidence for neuroma     Medical Decision Making:  Today's Assessment / Status / Plan:     1. Bone marrow edema     2. Abnormal MRI     3. Erythema of foot     4. Muscle twitching     5. Elevated blood pressure reading without diagnosis of hypertension       Patient Type: Primary Prevention    Etiology of Established CVD if Present:   None     Antithrombotic therapy:  Indication: possible small vessel vasculitis vs erythromelalgia   Anti-Platelet/Anti-Coagulant Tx: yes  - continue  ASA 325mg daily for now, monitor response.      Lipid Management: Qualifies for Statin Therapy Based on 2018 ACC/AHA Guidelines: no  Calculated 10-Year Risk of ASCVD (if LDL <189, no CKD G3b/4/5): low  Currently on Statin: No   NLA risk group: Low:  0-1 major ASCVD risk factors   Tx threshold:  non-HDL-C >189, LDL-C >159   Tx goal:  non-HDL <130, LDL-C <100 (optional apoB<90)   At goal:  no  Plan:   - reinforced ongoing TLC measures   - lab surveillance annually, defer to PCP     Blood Pressure Management:Goal: ACC/AHA (2017) goal <130/80   Home BP at goal: yes   Office BP at goal:  No, elevated   Echo: n/a  UACR: n/a   Plan:   Monitoring:   - start/continue home BP monitoring, reviewed correct technique:  Yes   - order 24h ABPM:  NO  Medications: no meds indicated at this time     Glycemic Status: Normal    Smoking:   reports that she has never smoked. She has never used smokeless tobacco.   - continued complete avoidance of all tobacco products     Physical  Activity: continue healthy activity to improve CV fitness, see care instructions for additional details     Weight Management and Nutrition: Dietary plan was discussed with patient at this visit including DASH, low sodium and/or as outlined in care instructions     Other:   1) recurrent Left foot erythema/swelling with intensive bone marrow edema on MRI  Unclear etiology, unusual presentation with complicated DDX.   Important characteristics include isolated to L foot (great toe and dorsum, not entire foot), no symptoms with cold exposure, improves with cold compresses.  Largely erythema with swelling.  No other focal neuro s/s.  Slightly improved with ASA     Ddx: erythromelalgia (EM) (primary vs secondary), bone marrow edema syndrome (BMES) of foot, small vessel vasospastic disease, CRPS, trauma, low prob infection, low prob rheum disorders, osteomyelitis unlikely based upon MRI and prior abx treatment w/o relief and no prior fractures/foot ulcers, etc     EM can be associated with myeloprolif syndrome but CBC w/ diff has been normal.  No other identified rheum or neurologic issues.  Mother has MS.      BMES is common in females in her age range, usu unilateral - unknown pathogenesis, usually this is self-limited to 3 to 9 months with spontaneous regression     Large vessel disease precluded based upon normal DENIZ and venous duplex.    Does not fit traditional pattern of Raynaud's or other vasospastic disorders.   Low prob primary vasculitis but will complete further assessment   All initial rheum labs normal including CCP ab, PIETER, cryoglob  SPEP, CRP, ESR normal    Completely normal foot exam today   For EM:  identify triggers and avoid, nonpharm measures:  Cooling, elevation, fan   - trial of aspirin 325mg daily for 1 month and monitor - this is primary therapy for EM  - add nifedipine 30mg ER daily to monitor for improvements   - could consider any number of additional therapies based upon case reports if truly  is EM  - ref to neurology eval for NCS/EMG assessment - still pending     For BMES: will trial offloading, aspirin, bisphosphonate with Vit D, in some cases iloprost has been used   - failed trial of nifedipine due to ADRs   - repeat MRI foot for interval changes - pending 10/6/20    Instructed to follow-up with PCP for remainder of adult medical needs: yes  We will partner with other providers in the management of established vascular disease and cardiometabolic risk factors.    Studies to Be Obtained: left foot MRI w/ and w/o  10/6/20   Labs to Be Obtained: as noted above     Follow up in: 4 weeks     Darshan Marcelino M.D.  [facility]   [facility]

## 2020-10-06 ENCOUNTER — APPOINTMENT (OUTPATIENT)
Dept: RADIOLOGY | Facility: MEDICAL CENTER | Age: 35
End: 2020-10-06
Attending: FAMILY MEDICINE
Payer: COMMERCIAL

## 2020-10-06 DIAGNOSIS — R93.7 BONE MARROW EDEMA: ICD-10-CM

## 2020-10-06 DIAGNOSIS — R93.89 ABNORMAL MRI: ICD-10-CM

## 2020-10-06 DIAGNOSIS — R25.3 MUSCLE TWITCHING: ICD-10-CM

## 2020-10-06 DIAGNOSIS — L53.9 ERYTHEMA OF FOOT: ICD-10-CM

## 2020-10-06 PROCEDURE — 73720 MRI LWR EXTREMITY W/O&W/DYE: CPT | Mod: LT

## 2020-10-06 PROCEDURE — A9576 INJ PROHANCE MULTIPACK: HCPCS | Performed by: FAMILY MEDICINE

## 2020-10-06 PROCEDURE — 700117 HCHG RX CONTRAST REV CODE 255: Performed by: FAMILY MEDICINE

## 2020-10-06 RX ADMIN — GADOTERIDOL 15 ML: 279.3 INJECTION, SOLUTION INTRAVENOUS at 11:29

## 2020-10-15 ENCOUNTER — OFFICE VISIT (OUTPATIENT)
Dept: URGENT CARE | Facility: CLINIC | Age: 35
End: 2020-10-15
Payer: COMMERCIAL

## 2020-10-15 VITALS
OXYGEN SATURATION: 96 % | HEART RATE: 75 BPM | DIASTOLIC BLOOD PRESSURE: 80 MMHG | WEIGHT: 148 LBS | RESPIRATION RATE: 20 BRPM | BODY MASS INDEX: 20.72 KG/M2 | SYSTOLIC BLOOD PRESSURE: 120 MMHG | TEMPERATURE: 98 F | HEIGHT: 71 IN

## 2020-10-15 DIAGNOSIS — S00.31XA ABRASION OF NOSE, INITIAL ENCOUNTER: ICD-10-CM

## 2020-10-15 PROCEDURE — 99214 OFFICE O/P EST MOD 30 MIN: CPT | Performed by: PHYSICIAN ASSISTANT

## 2020-10-15 ASSESSMENT — ENCOUNTER SYMPTOMS
SINUS PAIN: 0
FEVER: 0
SORE THROAT: 0
EYES NEGATIVE: 1
CHILLS: 0
RESPIRATORY NEGATIVE: 1
CARDIOVASCULAR NEGATIVE: 1

## 2020-10-15 ASSESSMENT — FIBROSIS 4 INDEX: FIB4 SCORE: 0.63

## 2020-10-15 NOTE — PROGRESS NOTES
Subjective:   Amelia Love is a 35 y.o. female who presents for Other (inside left side of nostril, swelling, painful, redness x 1.5 week)      HPI  Patient presents to clinic complaining of red, swollen and painful left nostril.  Located just inside the medial left nasal cavity.  Waxing waning symptoms for 1.5 weeks.  Negative drainage.  Occasionally she has a red-tinged mucus or runny nose, however denies any significant bloody nose.  No itching.  Denies any nasal congestion, sinus pain, fevers, chills, cough, ear pain.  She has tried normal saline washes recently with possible improvement.  She also reports mild crusting to the area.  She has no other complaints or concerns.      Review of Systems   Constitutional: Negative for chills and fever.   HENT: Negative for congestion, sinus pain and sore throat.         Nasal cavity irritation, pain, redness and crusting.   Eyes: Negative.    Respiratory: Negative.    Cardiovascular: Negative.        Medications:    • aspirin EC Tbec  • Calcium-Magnesium-Vitamin D Liqd  • levonorgestrel-ethinyl estradiol  • VITAMIN D PO    Allergies: Minocycline and Tetracycline    Problem List: Amelia Love has Abnormal uterine bleeding; Multiple nevi; Subclinical hypothyroidism; ENRRIQUE (obstructive sleep apnea); Family history of hypothyroidism; Encounter for surveillance of contraceptive pills; Dyslipidemia; Cellulitis of left lower extremity; and Nail deformity on their problem list.    Surgical History:  Past Surgical History:   Procedure Laterality Date   • DENTAL EXTRACTION(S)         Past Social Hx: Amelia Love  reports that she has never smoked. She has never used smokeless tobacco. She reports current alcohol use of about 0.5 oz of alcohol per week. She reports that she does not use drugs.     Past Family Hx:  Amelia Love family history includes Allergies in her sister; Cancer (age of onset: 50) in her maternal grandmother; Hyperlipidemia in her  "mother; Hypertension in her father; Other in her maternal aunt; Other (age of onset: 45) in her mother; Thyroid in her maternal aunt, maternal grandmother, and sister.     Problem list, medications, and allergies reviewed by myself today in Epic.     Objective:     /80 (BP Location: Left arm, Patient Position: Sitting, BP Cuff Size: Adult)   Pulse 75   Temp 36.7 °C (98 °F) (Temporal)   Resp 20   Ht 1.803 m (5' 11\")   Wt 67.1 kg (148 lb)   SpO2 96%   BMI 20.64 kg/m²     Physical Exam  Vitals signs reviewed.   Constitutional:       General: She is not in acute distress.     Appearance: Normal appearance. She is not ill-appearing or toxic-appearing.   HENT:      Nose:      Comments: Small linear superficial abrasion just inside the left medial nasal cavity.  Negative crusting.  Mild surrounding erythema and irritation.  Negative abscess or papule or blistering.  Negative drainage.  Negative bleeding.  Minimal tenderness to palpation.  Eyes:      Conjunctiva/sclera: Conjunctivae normal.      Pupils: Pupils are equal, round, and reactive to light.   Cardiovascular:      Rate and Rhythm: Normal rate.   Pulmonary:      Effort: Pulmonary effort is normal.   Skin:     General: Skin is warm and dry.   Neurological:      General: No focal deficit present.      Mental Status: She is alert and oriented to person, place, and time.   Psychiatric:         Mood and Affect: Mood normal.         Behavior: Behavior normal.         Assessment/Plan:     Diagnosis and associated orders:     1. Abrasion of nose, initial encounter  mupirocin (BACTROBAN) 2 % Ointment      Comments/MDM:     • Discussed with patient most likely due to the small abrasion due to dry nasal cavity.   • Cover for possible overlying bacterial with Bactroban ointment 2 times a day for about 5 to 7 days.   • Apply protective petroleum jelly to the area 2 times a day.   • Thereafter, keep hydrated with normal saline washes.  Nasal saline spray.   "     Supportive care, differential diagnoses, and indications for immediate follow-up discussed with patient.    Pathogenesis of diagnosis discussed including typical length and natural progression. Patient expresses understanding and agrees to plan.    Advised the patient to follow-up with the primary care physician for recheck, reevaluation, and consideration of further management.    Please note that this dictation was created using voice recognition software. I have made a reasonable attempt to correct obvious errors, but I expect that there are errors of grammar and possibly content that I did not discover before finalizing the note.    This note was electronically signed by Simone Peacock PA-C

## 2020-10-16 ENCOUNTER — OFFICE VISIT (OUTPATIENT)
Dept: VASCULAR LAB | Facility: MEDICAL CENTER | Age: 35
End: 2020-10-16
Attending: FAMILY MEDICINE
Payer: COMMERCIAL

## 2020-10-16 VITALS
HEART RATE: 74 BPM | HEIGHT: 71 IN | SYSTOLIC BLOOD PRESSURE: 112 MMHG | DIASTOLIC BLOOD PRESSURE: 73 MMHG | BODY MASS INDEX: 20.86 KG/M2 | WEIGHT: 149 LBS

## 2020-10-16 DIAGNOSIS — R25.3 MUSCLE TWITCHING: ICD-10-CM

## 2020-10-16 DIAGNOSIS — R93.7 BONE MARROW EDEMA: ICD-10-CM

## 2020-10-16 DIAGNOSIS — R93.89 ABNORMAL MRI: ICD-10-CM

## 2020-10-16 DIAGNOSIS — R03.0 ELEVATED BLOOD PRESSURE READING WITHOUT DIAGNOSIS OF HYPERTENSION: ICD-10-CM

## 2020-10-16 DIAGNOSIS — L53.9 ERYTHEMA OF FOOT: ICD-10-CM

## 2020-10-16 PROCEDURE — 99212 OFFICE O/P EST SF 10 MIN: CPT

## 2020-10-16 PROCEDURE — 99214 OFFICE O/P EST MOD 30 MIN: CPT | Performed by: FAMILY MEDICINE

## 2020-10-16 ASSESSMENT — ENCOUNTER SYMPTOMS
CHILLS: 0
BLURRED VISION: 0
PALPITATIONS: 0
DOUBLE VISION: 0
FOCAL WEAKNESS: 0
TREMORS: 0
WEAKNESS: 0
SEIZURES: 0
INSOMNIA: 0
DIZZINESS: 0
DIARRHEA: 0
HEADACHES: 0
MYALGIAS: 0
NERVOUS/ANXIOUS: 0
HEMOPTYSIS: 0
NAUSEA: 0
COUGH: 0
VOMITING: 0
WHEEZING: 0
ORTHOPNEA: 0
ABDOMINAL PAIN: 0
BLOOD IN STOOL: 0
SHORTNESS OF BREATH: 0
FEVER: 0
SORE THROAT: 0
DEPRESSION: 0
BRUISES/BLEEDS EASILY: 0

## 2020-10-16 ASSESSMENT — FIBROSIS 4 INDEX: FIB4 SCORE: 0.63

## 2020-10-16 NOTE — PROGRESS NOTES
FOLLOW-UP VASCULAR VISIT  Subjective:   Amelia Love is a 35 y.o. y.o. female  who presents today 10/16/20  for   Chief Complaint   Patient presents with   • Follow-Up     bone marrow, erythema L foot, MRI   initially referred by Dr. Boss (ortho) for eval for vascular disease in left foot     HPI:    Abnormal Left foot MRI:   Since last visit had MRI of foot.  Has noted last episode last PM after wine and standing.  Had Left toe - hot, burning sensation, rubor at dorsum of forefoot.    Case reviewed with Dr. Boss (ortho) last week who has no other suggestion.   Pending neurology 10/28.      Reports about same frequency of needing to ice foot to 6x over last 1 month.  Prior was 3-4x/week.   Current episodes include hot, tightness, redness, itching, swelling mostly at dorsum and left great toe area.  Mostly after taking shower.    Triggering events often when standing still doing dishes in evening and sometimes when lying flat.     Reports after standing for a while, gets hot sensation at big toe and dorsum of distal foot, then gets redness.    See patient's write up on symptoms (scanned into media).     Last year, 11-12/2019 developed recurrent redness at distal left foot.  Had been tx for cellulitis with augmentin - did not help.  Had normal LLE venous duplex and DENIZ normal earlier this year.  More recently MRI completed showing marrow edema with ddx including raynaud's and vasculitis.  Prior eval with Dr. Middleton (vasc surg, notes reviewed) in 3/2020 indicated no surgically interventions indicated  Prior labs for rheum conditions negative, CRP normal.  CBC has been normal.    HTN:  No prior dx or antiHTN meds.     Hyperlipidemia:    Mild high LDL, no prior med therapy  No ascvd.     Antiplatelet/anticoagulation:   ASA 325mg daily, no bleeding     Clinical evidence of ASCVD:  NONE     Major ASCVD risk factors:    1) Age (Men>44, women>54):  no   2) Fhx early CHD (MI, SCD, coronary revasc procedures  in men <55, women <65):  no   3) cigarette smoking:   reports that she has never smoked. She has never used smokeless tobacco.   4) high BP >139/89 or on tx: no   5) Low HDL-C (<40 men, <50 women): yes   HDL   Date Value Ref Range Status   11/21/2019 47 >=40 mg/dL Final      Current Outpatient Medications on File Prior to Visit   Medication Sig Dispense Refill   • mupirocin (BACTROBAN) 2 % Ointment Apply 1 Application to affected area(s) 2 times a day. 22 g 0   • VITAMIN D PO Take  by mouth.     • Calcium-Magnesium-Vitamin D 600-300-400 Liquid Take  by mouth.     • aspirin EC (ECOTRIN) 325 MG Tablet Delayed Response Take 1 Tab by mouth every day. 60 Tab    • levonorgestrel-ethinyl estradiol (SEASONALE) 0.15-0.03 MG per tablet Take 1 Tab by mouth every day. 84 Tab 3     No current facility-administered medications on file prior to visit.      Allergies   Allergen Reactions   • Minocycline    • Tetracycline      Social History     Tobacco Use   • Smoking status: Never Smoker   • Smokeless tobacco: Never Used   Substance Use Topics   • Alcohol use: Yes     Alcohol/week: 0.5 oz     Types: 1 Glasses of wine per week     Frequency: 2-3 times a week     Drinks per session: 1 or 2     Binge frequency: Never     Comment: occasional   • Drug use: No     DIET AND EXERCISE:  Weight Change:stable   BMI Readings from Last 5 Encounters:   10/16/20 20.78 kg/m²   10/15/20 20.64 kg/m²   09/25/20 21.00 kg/m²   09/10/20 20.86 kg/m²   08/31/20 20.78 kg/m²      Diet: common adult  Exercise: minimal exercise   Review of Systems   Constitutional: Negative for chills, fever and malaise/fatigue.   HENT: Negative for nosebleeds, sore throat and tinnitus.    Eyes: Negative for blurred vision and double vision.   Respiratory: Negative for cough, hemoptysis, shortness of breath and wheezing.    Cardiovascular: Negative for chest pain, palpitations, orthopnea and leg swelling.   Gastrointestinal: Negative for abdominal pain, blood in stool,  "diarrhea, melena, nausea and vomiting.   Genitourinary: Negative for hematuria.   Musculoskeletal: Negative for joint pain and myalgias.   Skin: Negative for itching and rash.   Neurological: Negative for dizziness, tremors, focal weakness, seizures, weakness and headaches.   Endo/Heme/Allergies: Does not bruise/bleed easily.   Psychiatric/Behavioral: Negative for depression. The patient is not nervous/anxious and does not have insomnia.       Objective:     Vitals:    10/16/20 1020   BP: 112/73   BP Location: Left arm   Patient Position: Sitting   BP Cuff Size: Adult   Pulse: 74   Weight: 67.6 kg (149 lb)   Height: 1.803 m (5' 11\")      BP Readings from Last 5 Encounters:   10/16/20 112/73   10/15/20 120/80   09/25/20 (!) 177/67   09/10/20 124/81   08/31/20 110/70      Body mass index is 20.78 kg/m².  Physical Exam  Vitals signs reviewed.   Constitutional:       Appearance: Normal appearance.   HENT:      Head: Normocephalic and atraumatic.      Nose: Nose normal.      Mouth/Throat:      Mouth: Mucous membranes are moist.      Pharynx: Oropharynx is clear.   Eyes:      Extraocular Movements: Extraocular movements intact.      Conjunctiva/sclera: Conjunctivae normal.   Neck:      Musculoskeletal: Normal range of motion and neck supple.   Cardiovascular:      Rate and Rhythm: Normal rate and regular rhythm.      Pulses: Normal pulses.           Carotid pulses are 2+ on the right side and 2+ on the left side.       Radial pulses are 2+ on the right side and 2+ on the left side.        Dorsalis pedis pulses are 2+ on the right side and 2+ on the left side.        Posterior tibial pulses are 2+ on the right side and 2+ on the left side.      Heart sounds: Normal heart sounds.      Comments:    Spider telangectasia:       RLE:  None      LLE: none   Varicosities:           RLE: none      LLE: none   Corona phlebectatica:      RLE:  None        LLE:  None   Cording:         RLE:  None     LLE: None     Pulmonary:      " Effort: Pulmonary effort is normal.      Breath sounds: Normal breath sounds.   Abdominal:      General: Abdomen is flat. Bowel sounds are normal.      Palpations: Abdomen is soft.   Musculoskeletal:      Right lower leg: No edema.      Left lower leg: No edema.   Skin:     General: Skin is warm and dry.      Capillary Refill: Capillary refill takes less than 2 seconds.      Coloration: Skin is not ashen, cyanotic, mottled or pale.      Findings: No erythema, lesion, petechiae or rash. Rash is not scaling or vesicular.      Nails: There is no clubbing.     Neurological:      General: No focal deficit present.      Mental Status: She is alert and oriented to person, place, and time. Mental status is at baseline.   Psychiatric:         Mood and Affect: Mood normal.         Behavior: Behavior normal.       Lab Results   Component Value Date    CHOLSTRLTOT 171 11/21/2019     (H) 11/21/2019    HDL 47 11/21/2019    TRIGLYCERIDE 80 11/21/2019             Lab Results   Component Value Date    SODIUM 137 08/25/2020    POTASSIUM 4.0 08/25/2020    CHLORIDE 103 08/25/2020    CO2 22 08/25/2020    GLUCOSE 99 08/25/2020    BUN 10 08/25/2020    CREATININE 0.80 08/25/2020    IFAFRICA >60 08/25/2020    IFNOTAFR >60 08/25/2020        Lab Results   Component Value Date    WBC 5.8 08/25/2020    RBC 5.15 08/25/2020    HEMOGLOBIN 15.6 08/25/2020    HEMATOCRIT 46.8 08/25/2020    MCV 90.9 08/25/2020    MCH 30.3 08/25/2020    MCHC 33.3 (L) 08/25/2020    MPV 11.7 08/25/2020      Ref. Range 6/20/2019 10:34   Ferritin Latest Ref Range: 10.0 - 291.0 ng/mL 32.4   TSH Latest Ref Range: 0.380 - 5.330 uIU/mL 1.730   Free T-4 Latest Ref Range: 0.53 - 1.43 ng/dL 1.24      Ref. Range 1/10/2020 15:07   Rheumatoid Factor -Neph- Latest Ref Range: 0 - 14 IU/mL <10   Stat C-Reactive Protein Latest Ref Range: 0.00 - 0.75 mg/dL 0.11   Ccp Antibodies Latest Ref Range: 0 - 19 Units 2      Ref. Range 8/25/2020 06:18   Sed Rate Westergren Latest Ref Range:  0 - 20 mm/hour 1      Ref. Range 8/25/2020 06:18   Antinuclear Antibody Latest Ref Range: None Detected  None Detected   ANCA IgG Latest Ref Range: <1:20  <1:20   EDWIN Reflex Unknown Not Done   Interpretation Unknown See Note   Albumin Latest Ref Range: 3.75 - 5.01 g/dL 4.08   Gamma Globulin Latest Ref Range: 0.62 - 1.51 g/dL 0.92   Alpha-1 Globulin Latest Ref Range: 0.19 - 0.46 g/dL 0.31   Alpha-2 Globulin Latest Ref Range: 0.48 - 1.05 g/dL 0.65   Beta Globulin Latest Ref Range: 0.48 - 1.10 g/dL 0.74      Ref. Range 8/25/2020 06:18   Cryoglobulin QL Latest Ref Range: NEG 72Hour  NEG 72Hour     VASCULAR IMAGING:   Last EKG: No results found for this or any previous visit.    LLE venous 1/7/20  No evidence of left lower extremity deep venous thrombosis.    LLE venous 3/9/20   Normal left lower extremity superficial and deep venous examination.    No evidence of superficial or deep venous reflux.    DENIZ 5/27/20                  RIGHT      Waveform            Systolic BPs (mmHg)                              115           Brachial   Triphasic                                Common Femoral   Triphasic                  151           Posterior Tibial   Triphasic                  155           Dorsalis Pedis                                            Peroneal                              1.27          DENIZ                                            TBI                           LEFT   Waveform        Systolic BPs (mmHg)                              122           Brachial   Triphasic                             Common Femoral   Triphasic            149           Posterior Tibial   Triphasic            146           Dorsalis Pedis                                            Peroneal                              1.22          DENIZ                                              TBI    Left MRI foot 6/19/20  1.  Intense bone marrow edema with enhancement throughout the 1st distal phalanx, 2nd distal phalanx, and 3rd distal  phalanx.   2.  Most likely/common etiology would be of edema from overuse/altered biomechanics. If that does not correlate with the history than other potential etiologies including severe Raynaud's phenomenon, vasculitis or thermal injury could be considered.   3.  1st through 5th metatarsophalangeal joint effusion and 1st and 2nd interphalangeal joint effusion   4.  Inflammatory arthritis should also be considered within the differential diagnosis   5.  No evidence for neuroma     Left MRI foot 10/6/20  1.  Stable MRI of the forefoot with edema and enhancement involving the first second and third distal phalanges but no bony destruction or soft tissue abnormality. Consider progressive systemic sclerosis, vasculitis/Raynaud's phenomenon over   hyperparathyroidism or psoriatic arthritis. Prior thermal injury is possible.   2.  No MR evidence of Thornton neuroma   3.  Similar small metatarsal-phalangeal and IP effusions most likely are reactive. No bony destruction to suggest inflammatory or septic arthropathy    Medical Decision Making:  Today's Assessment / Status / Plan:     1. Bone marrow edema  REFERRAL TO RHEUMATOLOGY    PTH INTACT    Criteria Systemic Sclerosis Panel    SJOGREN'S ANTIBODIES    CBC WITH DIFFERENTIAL    Comp Metabolic Panel    Sed Rate    C-REACTIVE PROTEIN, QUANT C   2. Abnormal MRI  REFERRAL TO RHEUMATOLOGY    PTH INTACT    Criteria Systemic Sclerosis Panel    SJOGREN'S ANTIBODIES    CBC WITH DIFFERENTIAL    Comp Metabolic Panel    Sed Rate    C-REACTIVE PROTEIN, QUANT C   3. Erythema of foot  REFERRAL TO RHEUMATOLOGY    PTH INTACT    Criteria Systemic Sclerosis Panel    SJOGREN'S ANTIBODIES    CBC WITH DIFFERENTIAL    Comp Metabolic Panel    Sed Rate    C-REACTIVE PROTEIN, QUANT C   4. Muscle twitching  REFERRAL TO RHEUMATOLOGY   5. Elevated blood pressure reading without diagnosis of hypertension       Patient Type: Primary Prevention    Etiology of Established CVD if Present:   None      Antithrombotic therapy:  Indication: possible small vessel vasculitis vs erythromelalgia   Anti-Platelet/Anti-Coagulant Tx: yes  - continue  ASA 325mg daily for now, monitor response.      Lipid Management: Qualifies for Statin Therapy Based on 2018 ACC/AHA Guidelines: no  Calculated 10-Year Risk of ASCVD (if LDL <189, no CKD G3b/4/5): low  Currently on Statin: No   NLA risk group: Low:  0-1 major ASCVD risk factors   Tx threshold:  non-HDL-C >189, LDL-C >159   Tx goal:  non-HDL <130, LDL-C <100 (optional apoB<90)   At goal:  no  Plan:   - reinforced ongoing TLC measures   - lab surveillance annually, defer to PCP     Blood Pressure Management:Goal: ACC/AHA (2017) goal <130/80   Home BP at goal: yes   Office BP at goal:  No, elevated   Echo: n/a  UACR: n/a   Plan:   Monitoring:   - start/continue home BP monitoring, reviewed correct technique:  Yes   - order 24h ABPM:  NO  Medications: no meds indicated at this time     Glycemic Status: Normal    Smoking:   reports that she has never smoked. She has never used smokeless tobacco.   - continued complete avoidance of all tobacco products     Physical Activity: continue healthy activity to improve CV fitness, see care instructions for additional details     Weight Management and Nutrition: Dietary plan was discussed with patient at this visit including DASH, low sodium and/or as outlined in care instructions     Other:   1) recurrent Left foot erythema/swelling with intensive bone marrow edema on MRI  Unclear etiology, unusual presentation with complicated DDX.   Important characteristics include isolated to L foot (great toe and dorsum, not entire foot), no symptoms with cold exposure, improves with cold compresses.  Largely erythema with swelling.  No other focal neuro s/s.  Slightly improved with ASA     Ddx: erythromelalgia (EM) (primary vs secondary), bone marrow edema syndrome (BMES) of foot, small vessel vasospastic disease, CRPS, trauma, low prob infection,  low prob rheum disorders, osteomyelitis unlikely based upon MRI and prior abx treatment w/o relief and no prior fractures/foot ulcers, etc     EM can be associated with myeloprolif syndrome but CBC w/ diff has been normal.  No other identified rheum or neurologic issues.  Mother has MS.      BMES is common in females in her age range, usu unilateral - unknown pathogenesis, usually this is self-limited to 3 to 9 months with spontaneous regression     Large vessel disease precluded based upon normal DENIZ and venous duplex.    Does not fit traditional pattern of Raynaud's or other vasospastic disorders.   Low prob primary vasculitis but will complete further assessment   All initial rheum labs normal including CCP ab, PIETER, cryoglob, SPEP, CRP, ESR normal    Completely normal foot exam today   For EM:  identify triggers and avoid, nonpharm measures:  Cooling, elevation, fan   - trial of aspirin 325mg daily for 1 month and monitor - this is primary therapy for EM  - add nifedipine 30mg ER daily to monitor for improvements   - could consider any number of additional therapies based upon case reports if truly is EM  - ref to neurology eval for NCS/EMG assessment - still pending     For BMES: will trial offloading, aspirin, bisphosphonate with Vit D, in some cases iloprost has been used   - failed trial of nifedipine due to ADRs     For possible inflamm arthritis and rheum conditions:  Initial labs negative   - ref to rheumatology for additional evaluation     Instructed to follow-up with PCP for remainder of adult medical needs: yes  We will partner with other providers in the management of established vascular disease and cardiometabolic risk factors.    Studies to Be Obtained: none   Labs to Be Obtained: as noted above     Follow up in: eval with neuro, rheum, f/u with [facility] med prn     Darshan Marcelino M.D.  [facility]   [facility]

## 2020-10-21 ENCOUNTER — OFFICE VISIT (OUTPATIENT)
Dept: MEDICAL GROUP | Facility: LAB | Age: 35
End: 2020-10-21
Payer: COMMERCIAL

## 2020-10-21 VITALS
HEART RATE: 90 BPM | RESPIRATION RATE: 16 BRPM | HEIGHT: 71 IN | WEIGHT: 150 LBS | TEMPERATURE: 98.7 F | DIASTOLIC BLOOD PRESSURE: 80 MMHG | BODY MASS INDEX: 21 KG/M2 | OXYGEN SATURATION: 96 % | SYSTOLIC BLOOD PRESSURE: 110 MMHG

## 2020-10-21 DIAGNOSIS — L72.0 EPIDERMOID CYST OF EAR: ICD-10-CM

## 2020-10-21 PROCEDURE — 99213 OFFICE O/P EST LOW 20 MIN: CPT | Performed by: FAMILY MEDICINE

## 2020-10-21 ASSESSMENT — FIBROSIS 4 INDEX: FIB4 SCORE: 0.63

## 2020-10-21 NOTE — PATIENT INSTRUCTIONS
Epidermal Cyst    An epidermal cyst is a small, painless lump under your skin. The cyst contains a grayish-white, bad-smelling substance (keratin). Do not try to pop or open an epidermal cyst yourself.  What are the causes?  · A blocked hair follicle.  · A hair that curls and re-enters the skin instead of growing straight out of the skin.  · A blocked pore.  · Irritated skin.  · An injury to the skin.  · Certain conditions that are passed along from parent to child (inherited).  · Human papillomavirus (HPV).  · Long-term sun damage to the skin.  What increases the risk?  · Having acne.  · Being overweight.  · Being 30-40 years old.  What are the signs or symptoms?  These cysts are usually harmless, but they can get infected. Symptoms of infection may include:  · Redness.  · Inflammation.  · Tenderness.  · Warmth.  · Fever.  · A grayish-white, bad-smelling substance drains from the cyst.  · Pus drains from the cyst.  How is this treated?  In many cases, epidermal cysts go away on their own without treatment. If a cyst becomes infected, treatment may include:  · Opening and draining the cyst, done by a doctor. After draining, you may need minor surgery to remove the rest of the cyst.  · Antibiotic medicine.  · Shots of medicines (steroids) that help to reduce inflammation.  · Surgery to remove the cyst. Surgery may be done if the cyst:  ? Becomes large.  ? Bothers you.  ? Has a chance of turning into cancer.  · Do not try to open a cyst yourself.  Follow these instructions at home:  · Take over-the-counter and prescription medicines only as told by your doctor.  · If you were prescribed an antibiotic medicine, take it it as told by your doctor. Do not stop using the antibiotic even if you start to feel better.  · Keep the area around your cyst clean and dry.  · Wear loose, dry clothing.  · Avoid touching your cyst.  · Check your cyst every day for signs of infection. Check for:  ? Redness, swelling, or pain.  ? Fluid  or blood.  ? Warmth.  ? Pus or a bad smell.  · Keep all follow-up visits as told by your doctor. This is important.  How is this prevented?  · Wear clean, dry, clothing.  · Avoid wearing tight clothing.  · Keep your skin clean and dry. Take showers or baths every day.  Contact a doctor if:  · Your cyst has symptoms of infection.  · Your condition does not improve or gets worse.  · You have a cyst that looks different from other cysts you have had.  · You have a fever.  Get help right away if:  · Redness spreads from the cyst into the area close by.  Summary  · An epidermal cyst is a sac made of skin tissue.  · If a cyst becomes infected, treatment may include surgery to open and drain the cyst, or to remove it.  · Take over-the-counter and prescription medicines only as told by your doctor.  · Contact a doctor if your condition is not improving or is getting worse.  · Keep all follow-up visits as told by your doctor. This is important.  This information is not intended to replace advice given to you by your health care provider. Make sure you discuss any questions you have with your health care provider.  Document Released: 01/25/2006 Document Revised: 04/09/2020 Document Reviewed: 09/26/2019  Elsevier Patient Education © 2020 [employer]

## 2020-10-22 NOTE — PROGRESS NOTES
Subjective:     Chief Complaint   Patient presents with   • Bump     bump behind left ear x 2 weeks       Amelia Love is a 35 y.o. female here today for evaluation and management of:    1. Epidermoid cyst of ear  Patient noticed a bump behind her left ear about two weeks ago.  She reports it was initially hard but softened up a few days a go and was purple.  Now almost gone.  No discharge or trauma        Allergies   Allergen Reactions   • Minocycline    • Tetracycline        Current medicines (including changes today)  Current Outpatient Medications   Medication Sig Dispense Refill   • mupirocin (BACTROBAN) 2 % Ointment Apply 1 Application to affected area(s) 2 times a day. 22 g 0   • VITAMIN D PO Take  by mouth.     • Calcium-Magnesium-Vitamin D 600-300-400 Liquid Take  by mouth.     • aspirin EC (ECOTRIN) 325 MG Tablet Delayed Response Take 1 Tab by mouth every day. 60 Tab    • levonorgestrel-ethinyl estradiol (SEASONALE) 0.15-0.03 MG per tablet Take 1 Tab by mouth every day. 84 Tab 3     No current facility-administered medications for this visit.        She  has a past medical history of Seasonal allergic rhinitis (11/15/2013).    Patient Active Problem List    Diagnosis Date Noted   • Cellulitis of left lower extremity 01/02/2020   • Nail deformity 01/02/2020   • Dyslipidemia 10/10/2019   • Encounter for surveillance of contraceptive pills 03/13/2019   • Family history of hypothyroidism 08/14/2018   • ENRRIQUE (obstructive sleep apnea) 05/02/2018   • Subclinical hypothyroidism 08/15/2017   • Abnormal uterine bleeding 11/18/2016   • Multiple nevi 11/18/2016       ROS   No fever or chills.  No nausea or vomiting.  No chest pain or palpitations.  No cough or SOB.  No pain with urination or hematuria.  No black or bloody stools.       Objective:     /80 (BP Location: Right arm, Patient Position: Sitting, BP Cuff Size: Adult)   Pulse 90   Temp 37.1 °C (98.7 °F) (Temporal)   Resp 16   Ht 1.803 m (5'  "11\")   Wt 68 kg (150 lb)   SpO2 96%  Body mass index is 20.92 kg/m².   Physical Exam:  Well developed, well nourished.  Alert, oriented in no acute distress.  Eye contact is good, speech goal directed, affect calm  Eyes: conjunctiva non-injected, sclera non-icteric.  Ears: Pinna normal. TM pearly gray. Very small cystic lesion in postauricular area  Nose: Nares are patent.  Normal mucosa  Mouth: Oral mucous membranes pink and moist with no lesions.  Neck Supple.  No adenopathy or masses in the neck or supraclavicular regions. No thyromegaly  Lungs: clear to auscultation bilaterally with good excursion. No wheezes or rhonchi  CV: regular rate and rhythm. No murmur        Assessment and Plan:   The following treatment plan was discussed    1. Epidermoid cyst of ear       Reassured.  Warm pack as needed  Any change or worsening of signs or symptoms, patient encouraged to follow-up or report to the emergency room for further evaluation. Patient understands and agrees.    Followup: Return if symptoms worsen or fail to improve.             "

## 2020-10-26 ENCOUNTER — HOSPITAL ENCOUNTER (OUTPATIENT)
Dept: LAB | Facility: MEDICAL CENTER | Age: 35
End: 2020-10-26
Attending: FAMILY MEDICINE
Payer: COMMERCIAL

## 2020-10-26 DIAGNOSIS — R93.89 ABNORMAL MRI: ICD-10-CM

## 2020-10-26 DIAGNOSIS — R93.7 BONE MARROW EDEMA: ICD-10-CM

## 2020-10-26 DIAGNOSIS — L53.9 ERYTHEMA OF FOOT: ICD-10-CM

## 2020-10-26 LAB
ALBUMIN SERPL BCP-MCNC: 4.2 G/DL (ref 3.2–4.9)
ALBUMIN/GLOB SERPL: 1.6 G/DL
ALP SERPL-CCNC: 48 U/L (ref 30–99)
ALT SERPL-CCNC: 13 U/L (ref 2–50)
ANION GAP SERPL CALC-SCNC: 9 MMOL/L (ref 7–16)
AST SERPL-CCNC: 11 U/L (ref 12–45)
BASOPHILS # BLD AUTO: 1.1 % (ref 0–1.8)
BASOPHILS # BLD: 0.05 K/UL (ref 0–0.12)
BILIRUB SERPL-MCNC: 0.3 MG/DL (ref 0.1–1.5)
BUN SERPL-MCNC: 10 MG/DL (ref 8–22)
CALCIUM SERPL-MCNC: 8.9 MG/DL (ref 8.5–10.5)
CHLORIDE SERPL-SCNC: 104 MMOL/L (ref 96–112)
CO2 SERPL-SCNC: 24 MMOL/L (ref 20–33)
CREAT SERPL-MCNC: 0.81 MG/DL (ref 0.5–1.4)
CRP SERPL HS-MCNC: 0.14 MG/DL (ref 0–0.75)
EOSINOPHIL # BLD AUTO: 0.11 K/UL (ref 0–0.51)
EOSINOPHIL NFR BLD: 2.4 % (ref 0–6.9)
ERYTHROCYTE [DISTWIDTH] IN BLOOD BY AUTOMATED COUNT: 40 FL (ref 35.9–50)
ERYTHROCYTE [SEDIMENTATION RATE] IN BLOOD BY WESTERGREN METHOD: 0 MM/HOUR (ref 0–20)
GLOBULIN SER CALC-MCNC: 2.6 G/DL (ref 1.9–3.5)
GLUCOSE SERPL-MCNC: 90 MG/DL (ref 65–99)
HCT VFR BLD AUTO: 43.3 % (ref 37–47)
HGB BLD-MCNC: 14.5 G/DL (ref 12–16)
IMM GRANULOCYTES # BLD AUTO: 0 K/UL (ref 0–0.11)
IMM GRANULOCYTES NFR BLD AUTO: 0 % (ref 0–0.9)
LYMPHOCYTES # BLD AUTO: 1.76 K/UL (ref 1–4.8)
LYMPHOCYTES NFR BLD: 38.1 % (ref 22–41)
MCH RBC QN AUTO: 30.6 PG (ref 27–33)
MCHC RBC AUTO-ENTMCNC: 33.5 G/DL (ref 33.6–35)
MCV RBC AUTO: 91.4 FL (ref 81.4–97.8)
MONOCYTES # BLD AUTO: 0.26 K/UL (ref 0–0.85)
MONOCYTES NFR BLD AUTO: 5.6 % (ref 0–13.4)
NEUTROPHILS # BLD AUTO: 2.44 K/UL (ref 2–7.15)
NEUTROPHILS NFR BLD: 52.8 % (ref 44–72)
NRBC # BLD AUTO: 0 K/UL
NRBC BLD-RTO: 0 /100 WBC
PLATELET # BLD AUTO: 199 K/UL (ref 164–446)
PMV BLD AUTO: 12 FL (ref 9–12.9)
POTASSIUM SERPL-SCNC: 4.3 MMOL/L (ref 3.6–5.5)
PROT SERPL-MCNC: 6.8 G/DL (ref 6–8.2)
PTH-INTACT SERPL-MCNC: 27.3 PG/ML (ref 14–72)
RBC # BLD AUTO: 4.74 M/UL (ref 4.2–5.4)
SODIUM SERPL-SCNC: 137 MMOL/L (ref 135–145)
WBC # BLD AUTO: 4.6 K/UL (ref 4.8–10.8)

## 2020-10-26 PROCEDURE — 85652 RBC SED RATE AUTOMATED: CPT

## 2020-10-26 PROCEDURE — 83516 IMMUNOASSAY NONANTIBODY: CPT

## 2020-10-26 PROCEDURE — 86235 NUCLEAR ANTIGEN ANTIBODY: CPT

## 2020-10-26 PROCEDURE — 86039 ANTINUCLEAR ANTIBODIES (ANA): CPT

## 2020-10-26 PROCEDURE — 80053 COMPREHEN METABOLIC PANEL: CPT

## 2020-10-26 PROCEDURE — 85025 COMPLETE CBC W/AUTO DIFF WBC: CPT

## 2020-10-26 PROCEDURE — 36415 COLL VENOUS BLD VENIPUNCTURE: CPT

## 2020-10-26 PROCEDURE — 83970 ASSAY OF PARATHORMONE: CPT

## 2020-10-26 PROCEDURE — 86140 C-REACTIVE PROTEIN: CPT

## 2020-10-28 ENCOUNTER — OFFICE VISIT (OUTPATIENT)
Dept: NEUROLOGY | Facility: MEDICAL CENTER | Age: 35
End: 2020-10-28
Payer: COMMERCIAL

## 2020-10-28 VITALS
TEMPERATURE: 100 F | WEIGHT: 150.79 LBS | BODY MASS INDEX: 21.11 KG/M2 | SYSTOLIC BLOOD PRESSURE: 112 MMHG | DIASTOLIC BLOOD PRESSURE: 76 MMHG | OXYGEN SATURATION: 97 % | HEIGHT: 71 IN | HEART RATE: 90 BPM

## 2020-10-28 DIAGNOSIS — G90.522 COMPLEX REGIONAL PAIN SYNDROME I OF LEFT LOWER LIMB: ICD-10-CM

## 2020-10-28 DIAGNOSIS — G62.9 NEUROPATHY: ICD-10-CM

## 2020-10-28 DIAGNOSIS — L60.8 NAIL DEFORMITY: ICD-10-CM

## 2020-10-28 DIAGNOSIS — M79.89 FOOT SWELLING: ICD-10-CM

## 2020-10-28 LAB
ENA SS-B IGG SER IA-ACNC: 0 AU/ML (ref 0–40)
SSA52 R0ENA AB IGG Q0420: 1 AU/ML (ref 0–40)
SSA60 R0ENA AB IGG Q0419: 0 AU/ML (ref 0–40)

## 2020-10-28 PROCEDURE — 99204 OFFICE O/P NEW MOD 45 MIN: CPT | Performed by: STUDENT IN AN ORGANIZED HEALTH CARE EDUCATION/TRAINING PROGRAM

## 2020-10-28 RX ORDER — GABAPENTIN 300 MG/1
300 CAPSULE ORAL 3 TIMES DAILY
Qty: 90 CAP | Refills: 4 | Status: SHIPPED | OUTPATIENT
Start: 2020-10-28 | End: 2021-07-08 | Stop reason: SDUPTHER

## 2020-10-28 ASSESSMENT — FIBROSIS 4 INDEX: FIB4 SCORE: 0.54

## 2020-10-28 NOTE — PROGRESS NOTES
GENERAL NEUROLOGY CLINIC INITIAL ENCOUNTER  CHIEF COMPLAINT(S): ***    Problem List Items Addressed This Visit     None          History of present illness:  Amelia Love 35 y.o. female presents today for       Last oct/november swelling pain aand left foot    Very painful, tight, pain    Had blood blister 7 years ago,     Derm  Gabapentin  neuropaty lab  Emg/ncs          Past medical history:   Past Medical History:   Diagnosis Date   • Seasonal allergic rhinitis 11/15/2013       Past surgical history:   Past Surgical History:   Procedure Laterality Date   • DENTAL EXTRACTION(S)         Family history:   Family History   Problem Relation Age of Onset   • Hypertension Father    • Hyperlipidemia Mother    • Other Mother 45        MS    • Thyroid Sister    • Cancer Maternal Grandmother 50        breast   • Thyroid Maternal Grandmother    • Allergies Sister    • Thyroid Maternal Aunt    • Other Maternal Aunt         Raynaud's    • Alcohol/Drug Neg Hx    • Diabetes Neg Hx    • Heart Disease Neg Hx    • Psychiatric Illness Neg Hx    • Clotting Disorder Neg Hx        Social history:   Social History     Socioeconomic History   • Marital status:      Spouse name: Not on file   • Number of children: Not on file   • Years of education: Not on file   • Highest education level: Master's degree (e.g., MA, MS, Bryan, MEd, MSW, [employer])   Occupational History   • Not on file   Social Needs   • Financial resource strain: Not very hard   • Food insecurity     Worry: Never true     Inability: Never true   • Transportation needs     Medical: No     Non-medical: No   Tobacco Use   • Smoking status: Never Smoker   • Smokeless tobacco: Never Used   Substance and Sexual Activity   • Alcohol use: Yes     Alcohol/week: 0.5 oz     Types: 1 Glasses of wine per week     Frequency: 2-3 times a week     Drinks per session: 1 or 2     Binge frequency: Never     Comment: occasional   • Drug use: No   • Sexual activity: Yes     Partners:  Male     Birth control/protection: Pill   Lifestyle   • Physical activity     Days per week: 4 days     Minutes per session: 30 min   • Stress: Not at all   Relationships   • Social connections     Talks on phone: Twice a week     Gets together: Never     Attends Alevism service: Never     Active member of club or organization: No     Attends meetings of clubs or organizations: Never     Relationship status:    • Intimate partner violence     Fear of current or ex partner: Not on file     Emotionally abused: Not on file     Physically abused: Not on file     Forced sexual activity: Not on file   Other Topics Concern   • Not on file   Social History Narrative    Lives with her partner Willy for 1 y.    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       Current medications:   Current Outpatient Medications   Medication   • mupirocin (BACTROBAN) 2 % Ointment   • VITAMIN D PO   • Calcium-Magnesium-Vitamin D 600-300-400 Liquid   • aspirin EC (ECOTRIN) 325 MG Tablet Delayed Response   • levonorgestrel-ethinyl estradiol (SEASONALE) 0.15-0.03 MG per tablet     No current facility-administered medications for this visit.        Medication Allergy:  Allergies   Allergen Reactions   • Minocycline    • Tetracycline          Review of systems:   Pertinent positives and negatives are as outlined above    Physical examination:   Vitals:    10/28/20 0921   Weight: 68.4 kg (150 lb 12.7 oz)     General: Patient in no acute distress, pleasant and cooperative.  HEENT: Normocephalic, no signs of acute trauma.   Neck: appears supple, here is normal range of motion. No tenderness on exam.   Chest: clear to auscultation. Symmetrical chest rise with inhalation. No cough.   CV: RRR, no murmurs.   Skin: no signs of acute rashes or trauma.   Musculoskeletal: joints exhibit full range of motion. There are no signs of joint or muscle swelling.   Psychiatric: No hallucinatory behavior. Denies symptoms of depression or  suicidal ideation. Mood and affect appear normal on exam.     NEUROLOGICAL EXAM:   Mental status:  orientation: Awake, alert and oriented to self, month, year, situation.  Attention: ***  Visio-spatial testing***  Frontal release signs ***  Speech and language: speech is clear and fluent. The patient is able to name, repeat and comprehend. No impairment in fluency. No word substitions or paraphasic errors  Memory: There is intact recollection of recent and remote events.   Cranial nerve exam:   I: smell Not tested   II: visual acuity  OS: ***    OD: ***   II: visual fields Full to confrontation  Visual neglect ***   II: pupils Equal, round, reactive to light   III,VII: ptosis None   III,IV,VI: extraocular muscles  Full ROM   V: mastication Normal   V: facial light touch sensation  Normal   V,VII: corneal reflex  Present   VII: facial muscle function - upper  Normal   VII: facial muscle function - lower Normal   VIII: hearing Not tested   IX: soft palate elevation  Normal   IX,X: gag reflex Present   XI: trapezius strength  5/5   XI: sternocleidomastoid strength 5/5   XI: neck flexion strength  5/5   XII: tongue strength  Normal     Motor exam:   • Strength is 5/5 in the distal and proximal upper and lower extremities except for the following: ***  • Tone is normal.  • No abnormal movements were seen on exam.   • No muscle fasciculation  • No areas of atrophy  • Tests of praxis ***  Sensory exam reveals normal sense of light touch, proprioception, vibration and pinprick in all extremities. Cortical sensory testing ***. Test of neglect ***  Deep tendon reflexes:  2+ throughout. Plantar responses are flexor***mute There is no clonus.   Coordination: shows a normal finger-nose-finger. Normal rapidly alternating movements. Heel-knee-shin movements smooth and coordinated bilaterally.   Gait:   • The patient was able to get up from seated position on first attempt without requiring assistance.   • Found to be steady when  walking.   • Movements were fluid with normal arm swing.   • The patient was able to turn without difficulties or tendency to fall.   • Romberg exam ***        ANCILLARY DATA REVIEWED:       Lab Data Review:  No results found for this or any previous visit (from the past 24 hour(s)).  [unfilled]    No results found for requested labs within last 720 hours.     Records reviewed:       Imaging:   ***    EEG:  ***      ASSESSMENT AND PLAN:  ***    There are no diagnoses linked to this encounter.     FOLLOW-UP:   No follow-ups on file.      EDUCATION AND COUNSELING:  ***    Patient/family agree with plan, as outlined.       Abraham Mcgrath MD  Epilepsy and General Neurology  Department of Neurology  Clinical  of Neurology [facility].     BILLING DOCUMENTATION:       Counseling:  I spent a total of *** minutes of face-to-face time in this visit. Over 50% of the time of the visit today was spent on counseling and or coordination of care wtih the patient and/or family, as above in assessment in plan.

## 2020-10-29 LAB
ANA INTERPRETIVE COMMENT Q5143: NORMAL
ANTINUCLEAR ANTIBODY (ANA), HEP-2, IGG Q5142: NORMAL
ENA SCL70 IGG SER QL: 0 AU/ML (ref 0–40)
RNAP III AB SER IA-ACNC: 5 UNITS (ref 0–19)

## 2020-11-05 NOTE — PROGRESS NOTES
GENERAL NEUROLOGY CLINIC INITIAL ENCOUNTER  CHIEF COMPLAINT(S): Recurrent erythema, edema of foot.      History of present illness:  Amelia Love 35 y.o. female presents today for evaluation of recurrent left foot erythema, possibly erythromelalgia, with abnormal MRI of the foot due to bone marrow edema.    Last year around November or December 2019 patient developed recurrent episodes of redness of her distal left foot and toes.  Concern that this might be an infection, she was treated with Augmentin thinking that this was cellulitis.  This did not improve her symptoms.  Subsequently she had a left lower extremity venous duplex and DENIZ both of which were normal.    Should her symptoms consist of episodes of left foot and toe tightness, redness, itchiness, swelling, hotness, mostly isolated at the dorsum of the foot and left big toe.  More commonly occurs after taking a hot shower.  Other triggering events include standing still for long periods of time (notices it when she is doing dishes standing), but also notices it when she is lying flat.  She notices the symptoms prior to the redness.  Symptoms occur 3-5 times per week sometimes more sometimes less.  The symptoms have not progressed to other areas of the body.  They have not gotten any worse or better.  The symptoms remain confined to the left foot and have not appeared to spread.    She has not noticed any significant weakness or impaired function to the area.  No numbness.  It does get very painful and red occasionally.  Ice helps the symptoms.  She has no history of trauma to the area either around the time of the symptoms or remotely at least that she can recall.  Other review of systems include negative fever, chills, fatigue, malaise.  No change in her vision, double vision, blurred vision.  No cough, chest pain, shortness of breath, GI symptoms such as abdominal pain, nausea, vomiting, diarrhea, constipation.  No arthralgias or myalgias.  No  rashes.  Mood remains good.  Denies anxiety or depression.  Sleep remains good.  Denies orthostasis.  Denies lightheadedness/dizziness.    More recently in June 2020 she had an MRI of her foot without contrast.  It was significant for intense bone marrow edema with enhancement throughout the first distal phalanx, second distal phalanx, and third distal phalanx.  There was also first through fifth metatarsophalangeal joint effusion and first and second interphalangeal joint effusion.  The differential diagnosis with this radiographic finding was broad and included overuse injury/altered biomechanics, severe Raynaud's phenomenon, vasculitis, thermal injury.  Patient had a repeat MRI of the left foot in early October with unchanged findings.  Results from this radiographic report further broaden the differential diagnosis to include progressive systemic sclerosis, hyperparathyroidism, psoriatic arthritis.    Prior rheumatological labs were negative.  CRP was normal.      Other past medical history, past surgical history, social history, family history were reviewed.  Other than mild hyperlipidemia, she is otherwise healthy female.  No heart disease or bleeding disorder personally noted.  No bleeding or clotting disorder in her family.  No other similar neurological complaints and family history.  Of note, there is Raynaud's disease in a maternal aunt.  She does not drink alcohol regularly.  She is a non-smoker.  She does not use illicit drugs.  She maintains a healthy lifestyle.        Medications and allergies were reviewed.    Past medical history:   Past Medical History:   Diagnosis Date   • Seasonal allergic rhinitis 11/15/2013       Past surgical history:   Past Surgical History:   Procedure Laterality Date   • DENTAL EXTRACTION(S)         Family history:   Family History   Problem Relation Age of Onset   • Hypertension Father    • Hyperlipidemia Mother    • Other Mother 45        MS    • Thyroid Sister    •  Cancer Maternal Grandmother 50        breast   • Thyroid Maternal Grandmother    • Allergies Sister    • Thyroid Maternal Aunt    • Other Maternal Aunt         Raynaud's    • Alcohol/Drug Neg Hx    • Diabetes Neg Hx    • Heart Disease Neg Hx    • Psychiatric Illness Neg Hx    • Clotting Disorder Neg Hx        Social history:   Social History     Socioeconomic History   • Marital status:      Spouse name: Not on file   • Number of children: Not on file   • Years of education: Not on file   • Highest education level: Master's degree (e.g., MA, MS, Bryan, MEd, MSW, [employer])   Occupational History   • Not on file   Social Needs   • Financial resource strain: Not very hard   • Food insecurity     Worry: Never true     Inability: Never true   • Transportation needs     Medical: No     Non-medical: No   Tobacco Use   • Smoking status: Never Smoker   • Smokeless tobacco: Never Used   Substance and Sexual Activity   • Alcohol use: Yes     Alcohol/week: 0.5 oz     Types: 1 Glasses of wine per week     Frequency: 2-3 times a week     Drinks per session: 1 or 2     Binge frequency: Never     Comment: occasional   • Drug use: No   • Sexual activity: Yes     Partners: Male     Birth control/protection: Pill   Lifestyle   • Physical activity     Days per week: 4 days     Minutes per session: 30 min   • Stress: Not at all   Relationships   • Social connections     Talks on phone: Twice a week     Gets together: Never     Attends Buddhist service: Never     Active member of club or organization: No     Attends meetings of clubs or organizations: Never     Relationship status:    • Intimate partner violence     Fear of current or ex partner: Not on file     Emotionally abused: Not on file     Physically abused: Not on file     Forced sexual activity: Not on file   Other Topics Concern   • Not on file   Social History Narrative    Lives with her partner Willy for 1 y.    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       Current medications:   Current Outpatient Medications   Medication   • gabapentin (NEURONTIN) 300 MG Cap   • VITAMIN D PO   • Calcium-Magnesium-Vitamin D 600-300-400 Liquid   • aspirin EC (ECOTRIN) 325 MG Tablet Delayed Response   • levonorgestrel-ethinyl estradiol (SEASONALE) 0.15-0.03 MG per tablet   • mupirocin (BACTROBAN) 2 % Ointment     No current facility-administered medications for this visit.        Medication Allergy:  Allergies   Allergen Reactions   • Minocycline    • Tetracycline          Review of systems:   Pertinent positives and negatives are as outlined above    Physical examination:   Vitals:    10/28/20 0921   BP: 112/76   Pulse: 90   Temp: 37.8 °C (100 °F)   SpO2: 97%   Weight: 68.4 kg (150 lb 12.7 oz)   Height: 1.803 m (5' 11\")     General: Patient in no acute distress, pleasant and cooperative.  HEENT: Normocephalic, no signs of acute trauma.   Neck: appears supple, here is normal range of motion. No tenderness on exam.   Chest: clear to auscultation. Symmetrical chest rise with inhalation. No cough.   CV: RRR, no murmurs.   Skin: Slight erythema over distal left foot and great big toe but no obvious swelling.  Nontender.  Good range of motion.  Dorsalis pedis and posterior tibialis pulses intact equally and bilaterally.  No atrophic skin findings.  Musculoskeletal: joints exhibit slightly diminished range of motion at the left ankle and left toe extension and flexion.. There are no signs of joint or muscle swelling.  Normal nail growth  Psychiatric: Negative as reported above    NEUROLOGICAL EXAM:   Mental status:  orientation: Awake, alert and oriented to self, month, year, situation.  Speech and language: speech is clear and fluent. The patient is able to name, repeat and comprehend. No impairment in fluency. No word substitions or paraphasic errors  Memory: There is intact recollection of recent and remote events.   Cranial nerve exam:   I: smell Not tested   II: visual " acuity   not tested   II: visual fields Full to confrontation  Not tested   II: pupils Equal, round, reactive to light   III,VII: ptosis None   III,IV,VI: extraocular muscles  Full ROM   V: mastication Normal   V: facial light touch sensation  Normal   V,VII: corneal reflex   not tested   VII: facial muscle function - upper  Normal   VII: facial muscle function - lower Normal   VIII: hearing Not tested   IX: soft palate elevation  Normal   IX,X: gag reflex Present   XI: trapezius strength  5/5   XI: sternocleidomastoid strength 5/5   XI: neck flexion strength  5/5   XII: tongue strength  Normal     Motor exam:   • Strength is 5/5 in the distal and proximal upper and lower extremities, including bilateral plantar flexion and dorsiflexion, eversion and inversion, toe flexion and extension  • Tone is normal.  • No abnormal movements were seen on exam.   • No muscle fasciculation  • No areas of atrophy  Sensory exam reveals normal sense of light touch, proprioception, vibration and pinprick in all extremities.   Deep tendon reflexes:  2+ throughout. Plantar responses are flexor There is no clonus.   Coordination: shows a normal finger-nose-finger. Normal rapidly alternating movements. Heel-knee-shin movements smooth and coordinated bilaterally.   Gait:   • The patient was able to get up from seated position on first attempt without requiring assistance.   • Found to be steady when walking.   • Movements were fluid with normal arm swing.   • The patient was able to turn without difficulties or tendency to fall.   • Romberg exam negative        ANCILLARY DATA REVIEWED:       Lab Data Review:  Reviewed    Records reviewed:   Reviewed    Imaging:   Reviewed          ASSESSMENT AND PLAN:  This is a 35-year-old with complaints of episodic pain, erythema, edema isolated to the left foot and left big toe.  Other than slightly diminished range of motion at the left ankle and left big toe, her neurological exam is intact.  Other  than some mild redness without edema there is no other abnormal exam findings.  Patient showed me on her phone what her flareups look like and a greater degree of redness and edema is apparent in the same distribution.  Patient has had extensive infectious, rheumatological, vascular work-up which has been unrevealing.  Her history/exam findings of pain, sensory changes, vasomotor, pseudomotor, and motor symptoms is suggestive of possible complex regional pain syndrome, which is characterized by episodes of regional pain that is disproportionate to any known trauma or lesion to that area.  There are 2 classifications of CRPS, type I and type II.  Type II there is evidence of nerve injury and prior trauma.  Type I there is no evidence of prior trauma and seemingly occur spontaneously.  Patient most closely fits for type I.  Of note, bone marrow edema is seen frequently and those with complex regional pain syndrome so her MRI findings are supportive of this diagnosis.  Diagnosis is generally clinical although adjunct tests help exclude other possibilities.  EMG/nerve conduction study helps identify nerve injury as seen in type II CRPS as well as ruling out alternative diagnoses.  Given that her symptoms most resemble a neuropathic pain, we can try gabapentin to start out with to see if symptoms improve.  Various other treatments exist for complex regional pain syndrome and include but are not limited to transcutaneous electrical nerve stimulation, massage therapy, various other medications including bisphosphonates, calcitonin, ketamine, Botox injections, opioids, and more invasive surgical procedures which include spinal cord stimulators, sympathectomy, amputations at the most extreme end.  As mentioned the exclusion of other possible diagnoses is important which an EMG and nerve conduction study can help with.  So we will start with that as well as labs.  Patient will follow up with me in 8 weeks.  She will let me  know her response to gabapentin.    I will send off labs as outlined below to evaluate for causes of neuropathy.    Visit Diagnoses     ICD-10-CM   1. Complex regional pain syndrome i of left lower limb  G90.522   2. Neuropathy  G62.9   3. Foot swelling  M79.89   4. Nail deformity  L60.8       Orders Placed This Encounter   • VITAMIN B12   • CRYOGLOBULIN QL SERUM RFLX   • HEMOGLOBIN A1C   • VITAMIN B1   • TSH   • VITAMIN B3 NIACIN   • VITAMIN B6   • HEAVY METALS BLOOD   • REFERRAL TO NEURODIAGNOSTICS (EEG,EP,EMG/NCS/DBS) Modality Requested: EMG/NCS-Comment Extremities   • gabapentin (NEURONTIN) 300 MG Cap       FOLLOW-UP:   Return in about 10 weeks (around 1/6/2021).              Abraham Mcgrath MD  Epilepsy and General Neurology  Department of Neurology  Clinical  of Neurology [facility].     BILLING DOCUMENTATION:       Counseling:  I spent a total of 48 minutes of face-to-face time in this visit. Over 50% of the time of the visit today was spent on counseling and or coordination of care wtih the patient and/or family, as above in assessment in plan.

## 2020-11-12 ENCOUNTER — NON-PROVIDER VISIT (OUTPATIENT)
Dept: NEUROLOGY | Facility: MEDICAL CENTER | Age: 35
End: 2020-11-12
Payer: COMMERCIAL

## 2020-11-12 DIAGNOSIS — G90.522 COMPLEX REGIONAL PAIN SYNDROME I OF LEFT LOWER LIMB: ICD-10-CM

## 2020-11-12 PROCEDURE — 95909 NRV CNDJ TST 5-6 STUDIES: CPT | Performed by: PSYCHIATRY & NEUROLOGY

## 2020-11-12 PROCEDURE — 95886 MUSC TEST DONE W/N TEST COMP: CPT | Mod: LT | Performed by: PSYCHIATRY & NEUROLOGY

## 2020-11-12 NOTE — PROCEDURES
"NERVE CONDUCTION STUDIES AND ELECTROMYOGRAPHY REPORT  [facility]  11/12/2020           IMPRESSION:  This is a normal electrodiagnostic study.  There is no evidence of large fiber peripheral neuropathy, fibular neuropathy, lumbosacral radiculopathy, or myopathy in the left lower extremity.    Delmy Emmanuel MD  Neurology - Neurophysiology  [facility]          REASON FOR REFERRAL:  Ms. Amelia Love 35 y.o. referred by Dr. Abraham Mcgrath for an evaluation of left foot pain of uncertain etiology.  Since 2019 patient has had pain in the dorsum of the left foot associated with skin changes.  No associated weakness or permanent numbness.  She does have some mild lower back pain without evidence of radiation.    Height: 5'11\"  Weight: 145 lbs    Symptom focused neurological exam shows mild redness in the dorsum of the left foot with otherwise unremarkable strength or changes in muscle bulk.  There is no swelling or other signs of infection in the left foot.      ELECTRODIAGNOSTIC EXAMINATION:  Nerve conduction studies (NCS) and electromyography (EMG) are utilized to evaluate direct or indirect damage to the peripheral nervous system. NCS are performed to measure the nerve(s) response(s) to electrostimulation across a given nerve segment. EMG evaluates the passive and active electrical activity of the muscle(s) in question.  Muscles are innervated by specific peripheral nerves and roots. Often times, several nerves the muscle to be examined in order to determine the presence or absence of the disease process. Furthermore, nerves and muscles may need to be tested in a [id number] comparison, as well as in additional extremities, as this may be crucial in characterizing the extent of the disease process, which may be diffuse or isolated and of varying degree of severity. The extent of the neurodiagnostic exam is justified as it may help arrive to a proper diagnosis, which ultimately may " contribute to better management of the patient. Therefore, the nerves to muscles examined during the study were medically necessary.    Unless otherwise noted, temperature of the extremity(s) study was monitored before and during the examination and remained between 32 and 36 degrees C for the upper extremities, and between 30 and 36 degrees C for the lower extremities. The patient tolerated testing well, without any complications.       NERVE CONDUCTION STUDY SUMMARY:  Selected nerves of the bilateral lower extremity are studied.    Normal and symmetric bilateral superficial fibular sensory responses.  Normal left sural sensory response.  Normal left fibular motor response at the extensor digitorum brevis.  F-wave is unobtainable which is of uncertain significance.  Normal left fibular motor response at the tibialis anterior.  Normal left tibial motor response at the abductor hallucis brevis.  Left tibial F wave is within normal limits.      NEEDLE EMG SUMMARY:  Concentric needle study of selected left lower extremity and left lower lumbar paraspinal muscles is performed.     Insertion activity is normal in all muscles sampled including left lower lumbar paraspinal muscles.   With activation, there are normal morphology (amplitude/duration) motor unit action potentials firing with normal recruitment in muscles tested.       PATIENT DATA TABLES  Nerve Conduction Studies     Stim Site NR Onset (ms) Norm Onset (ms) O-P Amp (µV) Norm O-P Amp Site1 Site2 Delta-P (ms) Dist (cm) Alverto (m/s) Norm Alverto (m/s)   Left Sup Fibular Anti Sensory (Ant Lat Mall)   14 cm    2.9 <4.5 12.8 >5.0 14 cm Ant Lat Mall 3.8 14.0 37 >32   Right Sup Fibular Anti Sensory (Ant Lat Mall)   14 cm    2.8 <4.5 10.9 >5.0 14 cm Ant Lat Mall 3.6 14.0 39 >32   Left Sural Anti Sensory (Lat Mall)   Calf    3.2 <4.5 9.4 >5 Calf Lat Mall 4.0 14.0 *35 >40        Stim Site NR Onset (ms) Norm Onset (ms) O-P Amp (mV) Norm O-P Amp Site1 Site2 Delta-0 (ms) Dist (cm)  Alverto (m/s) Norm Alverto (m/s)   Left Peroneal EDB Motor (Ext Dig Brev)   Ankle    4.5 <5.5 5.0 >3.0 B Fib Ankle 7.1 35.0 49 >40   B Fib    11.6  4.7  Poplt B Fib 1.4 10.0 71    Poplt    13.0  4.1          Left Peroneal TA Motor (AntTibialis)   Fib Head    3.3 <4 4.2 >4 Poplit Fib Head 1.2 10.0 83 >40   Poplit    4.5  4.0          Left Tibial Motor (Abd Membreno Brev)   Ankle    5.9 <6 11.1 >8 Knee Ankle 9.3 44.0 47 >40   Knee    15.2  11.0            F Wave Studies     NR F-Lat (ms) Lat Norm (ms)   Left Peroneal EDB (Ext Dig Brev)   *NR  <57   Left Tibial (Abd Hallucis)      49.53 <57                         Electromyography     Side Muscle Nerve Root Ins Act Fibs Psw Amp Dur Poly Recrt Int Pat Comment   Left AntTibialis Dp Br Fibular L4-5 Nml Nml Nml Nml Nml 0 Nml Nml    Left Gastroc Tibial S1-2 Nml Nml Nml Nml Nml 0 Nml Nml    Left VastusLat Femoral L2-4 Nml Nml Nml Nml Nml 0 Nml Nml    Left GluteusMed SupGluteal L5-S1 Nml Nml Nml Nml Nml 0 Nml Nml    Left Lumbo Parasp Low Rami L5-S1 Nml Nml Nml

## 2020-11-23 ENCOUNTER — HOSPITAL ENCOUNTER (OUTPATIENT)
Dept: LAB | Facility: MEDICAL CENTER | Age: 35
End: 2020-11-23
Attending: STUDENT IN AN ORGANIZED HEALTH CARE EDUCATION/TRAINING PROGRAM
Payer: COMMERCIAL

## 2020-11-23 DIAGNOSIS — G62.9 NEUROPATHY: ICD-10-CM

## 2020-11-23 DIAGNOSIS — L60.8 NAIL DEFORMITY: ICD-10-CM

## 2020-11-23 DIAGNOSIS — G90.522 COMPLEX REGIONAL PAIN SYNDROME I OF LEFT LOWER LIMB: ICD-10-CM

## 2020-11-23 DIAGNOSIS — M79.89 FOOT SWELLING: ICD-10-CM

## 2020-11-23 LAB
EST. AVERAGE GLUCOSE BLD GHB EST-MCNC: 103 MG/DL
HBA1C MFR BLD: 5.2 % (ref 0–5.6)
TSH SERPL DL<=0.005 MIU/L-ACNC: 3.19 UIU/ML (ref 0.38–5.33)
VIT B12 SERPL-MCNC: 341 PG/ML (ref 211–911)

## 2020-11-23 PROCEDURE — 82607 VITAMIN B-12: CPT

## 2020-11-23 PROCEDURE — 82595 ASSAY OF CRYOGLOBULIN: CPT

## 2020-11-23 PROCEDURE — 83655 ASSAY OF LEAD: CPT

## 2020-11-23 PROCEDURE — 82175 ASSAY OF ARSENIC: CPT

## 2020-11-23 PROCEDURE — 84425 ASSAY OF VITAMIN B-1: CPT

## 2020-11-23 PROCEDURE — 83036 HEMOGLOBIN GLYCOSYLATED A1C: CPT

## 2020-11-23 PROCEDURE — 84207 ASSAY OF VITAMIN B-6: CPT

## 2020-11-23 PROCEDURE — 83825 ASSAY OF MERCURY: CPT

## 2020-11-23 PROCEDURE — 84591 ASSAY OF NOS VITAMIN: CPT

## 2020-11-23 PROCEDURE — 36415 COLL VENOUS BLD VENIPUNCTURE: CPT

## 2020-11-23 PROCEDURE — 84443 ASSAY THYROID STIM HORMONE: CPT

## 2020-11-23 PROCEDURE — 82300 ASSAY OF CADMIUM: CPT

## 2020-11-25 LAB
ARSENIC BLD-MCNC: <10 UG/L
CADMIUM BLD-MCNC: <1 UG/L
LEAD BLDV-MCNC: <2 UG/DL
MERCURY BLD-MCNC: 5.5 UG/L

## 2020-11-27 LAB
VIT B1 BLD-MCNC: 124 NMOL/L (ref 70–180)
VIT B6 SERPL-MCNC: 31.6 NMOL/L (ref 20–125)

## 2020-11-28 LAB — CRYOGLOB SER QL 3D COLD INC: NORMAL

## 2020-12-02 LAB — NIACIN SERPL-MCNC: 3.56 UG/ML (ref 0.5–8.45)

## 2020-12-10 ENCOUNTER — APPOINTMENT (RX ONLY)
Dept: URBAN - METROPOLITAN AREA CLINIC 31 | Facility: CLINIC | Age: 35
Setting detail: DERMATOLOGY
End: 2020-12-10

## 2020-12-10 VITALS — TEMPERATURE: 97.7 F

## 2020-12-10 DIAGNOSIS — L81.4 OTHER MELANIN HYPERPIGMENTATION: ICD-10-CM

## 2020-12-10 DIAGNOSIS — Z71.89 OTHER SPECIFIED COUNSELING: ICD-10-CM

## 2020-12-10 DIAGNOSIS — L82.1 OTHER SEBORRHEIC KERATOSIS: ICD-10-CM

## 2020-12-10 DIAGNOSIS — D22 MELANOCYTIC NEVI: ICD-10-CM

## 2020-12-10 DIAGNOSIS — D18.0 HEMANGIOMA: ICD-10-CM

## 2020-12-10 PROBLEM — D23.62 OTHER BENIGN NEOPLASM OF SKIN OF LEFT UPPER LIMB, INCLUDING SHOULDER: Status: ACTIVE | Noted: 2020-12-10

## 2020-12-10 PROBLEM — D22.5 MELANOCYTIC NEVI OF TRUNK: Status: ACTIVE | Noted: 2020-12-10

## 2020-12-10 PROBLEM — D18.01 HEMANGIOMA OF SKIN AND SUBCUTANEOUS TISSUE: Status: ACTIVE | Noted: 2020-12-10

## 2020-12-10 PROBLEM — D22.71 MELANOCYTIC NEVI OF RIGHT LOWER LIMB, INCLUDING HIP: Status: ACTIVE | Noted: 2020-12-10

## 2020-12-10 PROBLEM — D22.72 MELANOCYTIC NEVI OF LEFT LOWER LIMB, INCLUDING HIP: Status: ACTIVE | Noted: 2020-12-10

## 2020-12-10 PROCEDURE — ? COUNSELING

## 2020-12-10 PROCEDURE — 99395 PREV VISIT EST AGE 18-39: CPT

## 2020-12-10 PROCEDURE — ? OBSERVATION AND MEASURE

## 2020-12-10 ASSESSMENT — LOCATION SIMPLE DESCRIPTION DERM
LOCATION SIMPLE: CHEST
LOCATION SIMPLE: LEFT UPPER BACK
LOCATION SIMPLE: RIGHT ACHILLES SKIN
LOCATION SIMPLE: LEFT ACHILLES SKIN
LOCATION SIMPLE: RIGHT BREAST
LOCATION SIMPLE: ABDOMEN

## 2020-12-10 ASSESSMENT — LOCATION DETAILED DESCRIPTION DERM
LOCATION DETAILED: LEFT ACHILLES SKIN
LOCATION DETAILED: LEFT MEDIAL SUPERIOR CHEST
LOCATION DETAILED: RIGHT LATERAL BREAST 6-7:00 REGION
LOCATION DETAILED: LEFT RIB CAGE
LOCATION DETAILED: RIGHT ACHILLES SKIN
LOCATION DETAILED: LEFT MEDIAL UPPER BACK
LOCATION DETAILED: EPIGASTRIC SKIN

## 2020-12-10 ASSESSMENT — LOCATION ZONE DERM
LOCATION ZONE: TRUNK
LOCATION ZONE: LEG

## 2020-12-10 NOTE — PROCEDURE: OBSERVATION
Detail Level: Detailed
Size Of Lesion: 1 mm
Size Of Lesion: 5 mm
Size Of Lesion: 4 mm
Size Of Lesion: 3 mm

## 2020-12-31 ENCOUNTER — OFFICE VISIT (OUTPATIENT)
Dept: NEUROLOGY | Facility: MEDICAL CENTER | Age: 35
End: 2020-12-31
Attending: STUDENT IN AN ORGANIZED HEALTH CARE EDUCATION/TRAINING PROGRAM
Payer: COMMERCIAL

## 2020-12-31 VITALS
SYSTOLIC BLOOD PRESSURE: 120 MMHG | TEMPERATURE: 98 F | WEIGHT: 151.46 LBS | OXYGEN SATURATION: 98 % | BODY MASS INDEX: 21.2 KG/M2 | RESPIRATION RATE: 16 BRPM | DIASTOLIC BLOOD PRESSURE: 62 MMHG | HEART RATE: 99 BPM | HEIGHT: 71 IN

## 2020-12-31 DIAGNOSIS — G90.522 COMPLEX REGIONAL PAIN SYNDROME I OF LEFT LOWER LIMB: ICD-10-CM

## 2020-12-31 DIAGNOSIS — M79.89 FOOT SWELLING: ICD-10-CM

## 2020-12-31 PROCEDURE — 99214 OFFICE O/P EST MOD 30 MIN: CPT | Performed by: STUDENT IN AN ORGANIZED HEALTH CARE EDUCATION/TRAINING PROGRAM

## 2020-12-31 PROCEDURE — 99212 OFFICE O/P EST SF 10 MIN: CPT | Performed by: STUDENT IN AN ORGANIZED HEALTH CARE EDUCATION/TRAINING PROGRAM

## 2020-12-31 RX ORDER — GABAPENTIN 100 MG/1
100 CAPSULE ORAL 3 TIMES DAILY
Qty: 270 EACH | Refills: 3 | Status: SHIPPED | OUTPATIENT
Start: 2020-12-31 | End: 2022-09-12 | Stop reason: SDUPTHER

## 2020-12-31 ASSESSMENT — FIBROSIS 4 INDEX: FIB4 SCORE: 0.54

## 2020-12-31 NOTE — PROGRESS NOTES
Neurology Clinic - Follow-up Note    Chief complaint: complex regional pain syndrome          Interval History:  36 yo yo with CRPS of left foot returns for follow-up. We started gabapentin 300 mg TID and since being on this medication she has noticed significant improvement. Pain and swelling have improved. She is very happy about the response to the medication. She has no complaints. No other neurological symptoms. Symptoms are not completely gone but they are much less frequent, less severe, and much more tolerable. Not affecting her gait.    Reviewed recent blood work with patient which was unremarkable.    Discussed her EMG/NCS results which were normal.     ROS: Pertinent positives and negatives are as documented above          Current medications:     Current Outpatient Medications   Medication Instructions   • aspirin EC (ECOTRIN) 325 mg, Oral, DAILY   • Calcium-Magnesium-Vitamin D 600-300-400 Liquid Oral   • gabapentin (NEURONTIN) 300 mg, Oral, 3 TIMES DAILY   • levonorgestrel-ethinyl estradiol (SEASONALE) 0.15-0.03 MG per tablet 1 Tab, Oral, DAILY   • mupirocin (BACTROBAN) 2 % Ointment 1 Application, Topical, 2 TIMES DAILY   • VITAMIN D PO Oral           Physical examination:   Vitals:    12/31/20 0910   BP: 120/62   Pulse: 99   Resp: 16   Temp: 36.7 °C (98 °F)   SpO2: 98%     Vitals:    12/31/20 0910   BP: 120/62   Pulse: 99   Resp: 16   Temp: 36.7 °C (98 °F)   SpO2: 98%       General: Patient in no acute distress, pleasant and cooperative.  HEENT: Normocephalic, no signs of acute trauma.   Neck: visibly supple, with  normal range of motion.   Chest: clear to auscultation. No cough.   CV: RRR, no murmurs.   Skin: no signs of acute rashes or trauma.   Musculoskeletal: joints exhibit slightly diminished range of motion at the left ankle and left toe extension and flexion.. There are no signs of joint or muscle swelling.  Normal nail growth  Psychiatric:  Denies symptoms of depression or suicidal ideation.  Mood and affect appear normal on exam.      NEUROLOGICAL EXAM:   Mental status, orientation: Awake, alert and fully oriented.   Speech and language: speech is clear and fluent.  Patient is able to articulate her past medical history well and comprehension is grossly intact  Cranial nerve exam: Pupils are 3-4 mm bilaterally. Visual fields are intact by confrontation. There is No nystagmus. Intact full EOM in all directions of gaze. Face appears symmetric.      Motor exam: Strength is 5/5 in all extremities on right and 5/5 strength through. Tone is normal. No abnormal movements were seen on exam.   Sensory exam reveals normal sense of light touch,  Deep tendon reflexes:  2+ throughout. Plantar responses are flexor. There is no clonus.   Coordination: shows a normal finger-nose-finger.   Gait: Deferred        ANCILLARY DATA REVIEWED:      Lab Data Review:  Hospital Outpatient Visit on 11/23/2020   Component Date Value Ref Range Status   • Arsenic, Blood 11/23/2020 <10.0  <=12.0 ug/L Final    Comment: INTERPRETIVE INFORMATION: Arsenic, Blood  Elevated results may be due to skin or collection-related  contamination, including the use of a noncertified metal-free  collection/transport tube. If contamination concerns exist due to  elevated levels of blood arsenic, confirmation with a second  specimen collected in a certified metal-free tube is recommended.  Potentially toxic ranges for blood arsenic: Greater than or equal  to 600 ug/L.  Blood arsenic is for the detection of recent exposure poisoning  only. Blood arsenic levels in healthy subjects vary considerably  with exposure to arsenic in the diet and the environment. A  24-hour urine arsenic is useful for the detection of chronic  exposure.  Test developed and characteristics determined by [facility]. See Compliance Statement B: [email or web address]     • Mercury, Blood 11/23/2020 5.5  <=10.0 ug/L Final    Comment: INTERPRETIVE INFORMATION: Mercury, Blood  Elevated  "results may be due to skin or collection-related  contamination, including the use of a noncertified metal-free  collection/transport tube. If contamination concerns exist due to  elevated levels of blood mercury, confirmation with a second  specimen collected in a certified metal-free tube is recommended.  Blood mercury levels predominantly reflect recent exposure and are  most useful in the diagnosis of acute poisoning as blood mercury  concentrations rise sharply and fall quickly over several days  after ingestion. Blood concentrations in unexposed individuals  rarely exceed 20 ug/L. The provided reference interval relates to  inorganic mercury concentrations. Dietary and non-occupational  exposure to organic mercury forms may contribute to an elevated  total mercury result. Clinical presentation after toxic exposure  to organic mercury may include dysarthria, ataxia and constricted  vision fields with mercury blood concentrations f                           rom 20 to 50  ug/L.  Test developed and characteristics determined by [facility]. See Compliance Statement B: [facility]/CS  Performed By: [facility]  [address] [place] [id number]  : Bette Soni MD     • Lead Blood 11/23/2020 <2.0  <=4.9 ug/dL Final    Comment: INTERPRETIVE INFORMATION: Lead, Blood (Venous)  Elevated results may be due to skin or collection-related  contamination, including the use of a noncertified lead-free tube.  If contamination concerns exist due to elevated levels of blood  lead, confirmation with a second specimen collected in a certified  lead-free tube is recommended.  Information sources for reference intervals and interpretive  comments include the \"CDC Response to the 2012 Advisory Committee  on Childhood Lead Poisoning Prevention Report\" and the  \"Recommendations for Medical Management of Adult Lead Exposure,  Environmental Health Perspectives, 2007.\" Thresholds and " time  intervals for retesting, medical evaluation, and response vary by  state and regulatory body. Contact your State Department of Health  and/or applicable regulatory agency for specific guidance on  medical management recommendations.  Age            Concentration   Comment  All ages       5-9.9 ug/dL     Adverse health effects are  possible, part                           icularly in  children under 6 years of  age and pregnant women.  Discuss health risks  associated with continued  lead exposure. For children  and women who are or may  become pregnant, reduce  lead exposure.  All ages        10-19.9 ug/dL  Reduced lead exposure and  increased biological  monitoring are recommended.  All ages        20-69.9 ug/dL  Removal from lead exposure  and prompt medical  evaluation are recommended.  Consider chelation therapy  when concentrations exceed  50 ug/dL and symptoms of  lead toxicity are present.  Less than 19     Greater than  Critical. Immediate medical  years of age     44.9 ug/dL    evaluation is recommended.  Consider chelation therapy  when symptoms of lead  toxicity are present.  Greater than 19  Greater than  Critical. Immediate medical  years of age     69.9 ug/dL    evaluation is recommended  Consider chelation therapy  when symptoms of lead  toxicity are present.  Test developed and characteristics determined by [facility]. See Compl                           iance Statement B: [email or web address]     • Cadmium Blood 11/23/2020 <1.0  <=5.0 ug/L Final    Comment: INTERPRETATION INFORMATION: Cadmium, Blood  Elevated results may be due to skin or collection-related  contamination, including the use of a noncertified metal-free  collection/transport tube. If contamination concerns exist due to  elevated levels of blood cadmium, confirmation with a second  specimen collected in a certified metal-free tube is recommended.  Blood cadmium levels can be used to monitor acute toxicity and in  combination  with cadmium urine and B-2 microglobulin is the  preferred method for monitoring occupational exposure. Symptoms  associated with cadmium toxicity vary based upon route of exposure  and may include tubular proteinuria, fever, headache, dyspnea,  chest pain, conjunctivitis, rhinitis, sore throat and cough.  Ingestion of cadmium in high concentration may cause vomiting,  diarrhea, salivation, cramps, and abdominal pain.  Test developed and characteristics determined by [facility]. See Compliance Statement B: [employer]     • Vitamin B6 11/23/2020 31.6  20.0 - 125.0 nmol/L Final    Comment: INTERPRETIVE INFORMATION: Vitamin B6 (Pyridoxal 5-Phosphate)  Pyridoxal 5'-phosphate measured in a specimen collected following  an 8-hour or overnight fast accurately indicates vitamin B6  nutritional status. Non-fasting specimen concentration reflects  recent vitamin intake.  Test developed and characteristics determined by [facility]. See Compliance Statement B: [employer]  Performed By: [facility]  [address] [place] [id number]  : Bette Soni MD     • Vitamin B3 - Niacin 11/23/2020 3.56  0.50 - 8.45 ug/mL Final    Comment: Adult Reference Range  > or = 10 years:  Normal          0.50 - 8.45 ug/mL  Low          < 0.50 ug/mL  High          > 8.45 ug/mL  Pediatric Reference Range  <10 Years:  Normal          0.50 - 8.91 ug/mL  Low          < 0.50 ug/mL  High          > 8.91 ug/mL  The performance characteristics of the listed assay was validated  by [facility]. The [place] FDA has not approved or cleared this test. The  results of  this assay can be used for clinical diagnosis without FDA  approval. [facility] is a CLIA certified, CAP accredited laboratory for  performing high  complexity assays such as this one.  Testing Performed at: [facility] [address] [place] [address], [place] [address]     • TSH 11/23/2020 3.190  0.380 -  5.330 uIU/mL Final    Comment: Please note new reference ranges effective 12/14/2017 10:00 AM  Pregnant Females, 1st Trimester  0.050-3.700  Pregnant Females, 2nd Trimester  0.310-4.350  Pregnant Females, 3rd Trimester  0.410-5.180     • Vitamin B1 11/23/2020 124  70 - 180 nmol/L Final    Comment: INTERPRETIVE INFORMATION: Vitamin B1, Whole Blood  This assay measures the concentration of thiamine diphosphate  (TDP), the primary active form of vitamin B1. Approximately 90  percent of vitamin B1 present in whole blood is TDP. Thiamine and  thiamine monophosphate, which comprise the remaining 10 percent,  are not measured.  Test developed and characteristics determined by [facility]. See Compliance Statement B: [facility]  Performed By: [facility]  [address] [place] [address]  : Bette Soni MD     • Glycohemoglobin 11/23/2020 5.2  0.0 - 5.6 % Final    Comment: Increased risk for diabetes:  5.7 -6.4%  Diabetes:  >6.4%  Glycemic control for adults with diabetes:  <7.0%  The above interpretations are per ADA guidelines.  Diagnosis  of diabetes mellitus on the basis of elevated Hemoglobin A1c  should be confirmed by repeating the Hb A1c test.     • Est Avg Glucose 11/23/2020 103  mg/dL Final    Comment: The eAG calculation is based on the A1c-Derived Daily Glucose  (ADAG) study.  See the ADA's website for additional information.     • Cryoglobulin QL 11/23/2020 NEG 72Hour  NEG 72Hour Final    Comment: INTERPRETIVE INFORMATION: Cryoglobulin Qualitative  with Reflex  The sample is examined daily for the presence or absence of  cryoglobulin.  Test developed and characteristics determined by [facility]. See Compliance Statement B: [employer]/[facility]  Performed By: [facility]  [address] [place] [address]  : Bette Soni MD     • Vitamin B12 -True Cobalamin 11/23/2020 341  211 - 911 pg/mL Final   Hospital Outpatient Visit on  10/26/2020   Component Date Value Ref Range Status   • Sed Rate Westergren 10/26/2020 0  0 - 20 mm/hour Final   • Sodium 10/26/2020 137  135 - 145 mmol/L Final   • Potassium 10/26/2020 4.3  3.6 - 5.5 mmol/L Final   • Chloride 10/26/2020 104  96 - 112 mmol/L Final   • Co2 10/26/2020 24  20 - 33 mmol/L Final   • Anion Gap 10/26/2020 9.0  7.0 - 16.0 Final   • Glucose 10/26/2020 90  65 - 99 mg/dL Final   • Bun 10/26/2020 10  8 - 22 mg/dL Final   • Creatinine 10/26/2020 0.81  0.50 - 1.40 mg/dL Final   • Calcium 10/26/2020 8.9  8.5 - 10.5 mg/dL Final   • AST(SGOT) 10/26/2020 11* 12 - 45 U/L Final   • ALT(SGPT) 10/26/2020 13  2 - 50 U/L Final   • Alkaline Phosphatase 10/26/2020 48  30 - 99 U/L Final   • Total Bilirubin 10/26/2020 0.3  0.1 - 1.5 mg/dL Final   • Albumin 10/26/2020 4.2  3.2 - 4.9 g/dL Final   • Total Protein 10/26/2020 6.8  6.0 - 8.2 g/dL Final   • Globulin 10/26/2020 2.6  1.9 - 3.5 g/dL Final   • A-G Ratio 10/26/2020 1.6  g/dL Final   • WBC 10/26/2020 4.6* 4.8 - 10.8 K/uL Final   • RBC 10/26/2020 4.74  4.20 - 5.40 M/uL Final   • Hemoglobin 10/26/2020 14.5  12.0 - 16.0 g/dL Final   • Hematocrit 10/26/2020 43.3  37.0 - 47.0 % Final   • MCV 10/26/2020 91.4  81.4 - 97.8 fL Final   • MCH 10/26/2020 30.6  27.0 - 33.0 pg Final   • MCHC 10/26/2020 33.5* 33.6 - 35.0 g/dL Final   • RDW 10/26/2020 40.0  35.9 - 50.0 fL Final   • Platelet Count 10/26/2020 199  164 - 446 K/uL Final   • MPV 10/26/2020 12.0  9.0 - 12.9 fL Final   • Neutrophils-Polys 10/26/2020 52.80  44.00 - 72.00 % Final   • Lymphocytes 10/26/2020 38.10  22.00 - 41.00 % Final   • Monocytes 10/26/2020 5.60  0.00 - 13.40 % Final   • Eosinophils 10/26/2020 2.40  0.00 - 6.90 % Final   • Basophils 10/26/2020 1.10  0.00 - 1.80 % Final   • Immature Granulocytes 10/26/2020 0.00  0.00 - 0.90 % Final   • Nucleated RBC 10/26/2020 0.00  /100 WBC Final   • Neutrophils (Absolute) 10/26/2020 2.44  2.00 - 7.15 K/uL Final    Includes immature neutrophils, if present.   •  Lymphs (Absolute) 10/26/2020 1.76  1.00 - 4.80 K/uL Final   • Monos (Absolute) 10/26/2020 0.26  0.00 - 0.85 K/uL Final   • Eos (Absolute) 10/26/2020 0.11  0.00 - 0.51 K/uL Final   • Baso (Absolute) 10/26/2020 0.05  0.00 - 0.12 K/uL Final   • Immature Granulocytes (abs) 10/26/2020 0.00  0.00 - 0.11 K/uL Final   • NRBC (Absolute) 10/26/2020 0.00  K/uL Final   • SSA 52 (R0)(KIRA) Ab, IgG 10/26/2020 1  0 - 40 AU/mL Final    Comment: INTERPRETIVE INFORMATION: SSA-52 (Ro52) (KIRA) Antibody, IgG  29 AU/mL or Less ............. Negative  30 - 40 AU/mL ................ Equivocal  41 AU/mL or Greater .......... Positive  SSA-52 (Ro52) and/or SSA-60 (Ro60) antibodies are associated with  a diagnosis of Sjogren syndrome, systemic lupus erythematosus  (SLE), and systemic sclerosis. SSA-52 antibody overlaps  significantly with the major SSc-related antibodies. SSA-52 (Ro52)  antibody occurs frequently in patients with inflammatory  myopathies, often in the presence of interstitial lung disease.     • SSA 60 (R0)(KIRA) Ab, IgG 10/26/2020 0  0 - 40 AU/mL Final    Comment: REFERENCE INTERVAL: SSA-60 (Ro60) (KIRA) Antibody, IgG  29 AU/mL or Less ............. Negative  30 - 40 AU/mL ................ Equivocal  41 AU/mL or Greater .......... Positive     • Sjogren'S Anti-Ss-B 10/26/2020 0  0 - 40 AU/mL Final    Comment: INTERPRETIVE INFORMATION: SSB (La) (KIRA) Ab, IgG  29 AU/mL or Less ............. Negative  30 - 40 AU/mL ................ Equivocal  41 AU/mL or Greater .......... Positive  SSB (La) antibody is seen in 50-60% of Sjogren syndrome cases and  is specific if it is the only KIRA antibody present. 15-25% of  patients with systemic lupus erythematosus (SLE) and 5-10% of  patients with progressive systemic sclerosis (PSS) also have this  antibody.  Performed By: [facility]  [address] [place] [address]  : Bette Soni MD     • Antinuclear Antibody (PIETER), HEp-2,* 10/26/2020 <1:80  <1:80  Final   • PIETER Interpretive Comment 10/26/2020 See Note   Final    Comment: Antinuclear antibodies by IFA negative for homogeneous, speckled,  nucleolar, centromere, and nuclear dots patterns.  Cytoplasmic antibodies by IFA negative for reticular/AMA,  discrete/GW body-like, polar/golgi-like, rods and rings, and  cytoplasmic speckled patterns.  INTERPRETIVE INFORMATION: PIETER Interpretive Comment  Presence of antinuclear antibodies (PIETER) is a hallmark feature of  systemic autoimmune rheumatic diseases (SARD). However, PIETER lacks  diagnostic specificity and is associated with a variety of  diseases (cancers, autoimmune, infectious, and inflammatory  conditions)  and may also occur in healthy individuals in varying  prevalence. The lack of diagnostic specificity requires  confirmation of positive PIETER by more specific serologic tests. PIETER  (nuclear reactivity) positive patterns reported include  centromere, homogeneous, nuclear dots, nucleolar, or speckled. PIETER  (cytoplasmic reactivity) positive patterns reported include  reticular/AMA, discrete/GW body-like, polar/                           golgi-like,  cytoplasmic speckled or rods and rings. All positive patterns are  reported to endpoint titers (1:2560). Reported patterns may help  guide differential diagnosis, although they may not be specific  for individual antibodies or diseases. Mitotic staining patterns  not reported.  Negative results do not necessarily rule out SARD.     • Anti-Scl-70 10/26/2020 0  0 - 40 AU/mL Final    Comment: INTERPRETIVE INFORMATION: Scleroderma (Scl-70) (KIRA) Ab, IgG  29 AU/mL or Less ............. Negative  30 - 40 AU/mL ................ Equivocal  41 AU/mL or Greater .......... Positive  The presence of Scl-70 antibodies (also referred to as  topoisomerase I, elvira-I or MARK) is considered diagnostic for  systemic sclerosis (SSc). Scl-70 antibodies alone are detected in  about 20 percent of SSc patients and are associated with the  diffuse  form of the disease, which may include specific organ  involvement and poor prognosis. Scl-70 antibodies have also been  reported in a varying percentage of patients with systemic lupus  erythematosus (SLE). Scl-70 (elvira-1) is a DNA binding protein and  anti-DNA/DNA complexes in the sera of SLE patients may bind to  elvira-I, leading to a false-positive result. The presence of Scl-70  antibody in sera may also be due to contamination of recombinant  Scl-70 with DNA derived from cellular material used in  immunoassays. Strong clinical correlation is recomm                           ended if both  Scl-70 and dsDNA antibodies are detected.  Negative results do not necessarily rule out the presence of SSc.  If clinical suspicion remains, consider further testing for  centromere, RNA polymerase III and U3-RNP, PM/Scl, or Th/To  antibodies.     • RNA Polymerase III Ab, IgG 10/26/2020 5  0 - 19 Units Final    Comment: INTERPRETIVE INFORMATION: RNA Polymerase III Antibody, IgG  19 Units or less ......Negative  20 - 39 Units .........Weak Positive  40 - 80 Units .........Moderate Positive  81 Units or greater ...Strong Positive  The presence of RNA polymerase III IgG antibody, when considered  in conjunction with other laboratory and clinical findings, is an  aid in the diagnosis of systemic sclerosis (SSc) with increased  incidence of skin involvement and renal crisis with the diffuse  cutaneous form of SSc. RNA polymerase III IgG antibody occur in  about 11-23 percent of SSc patients, and typically in the absence  of anti-centromere and anti-Scl-70 antibodies.  A negative result indicates no detectable IgG antibodies to the  dominant antigen of RNA polymerase III and does not rule out the  possibility of SSc. False-positive results may also occur due to  non-specific binding of immune complexes. Strong clinical  correlation is recommended.  If clinical suspicion remains, consider additional testing                             for  other antibodies associated with SSc, including centromere,  Scl-70, U3-RNP, PM/Scl, or Th/To.  Performed By: [facility]  [address] [place] [address]  : Bette Soni MD     • Pth, Intact 10/26/2020 27.3  14.0 - 72.0 pg/mL Final   • Stat C-Reactive Protein 10/26/2020 0.14  0.00 - 0.75 mg/dL Final   • GFR If  10/26/2020 >60  >60 mL/min/1.73 m 2 Final   • GFR If Non  10/26/2020 >60  >60 mL/min/1.73 m 2 Final          ASSESSMENT AND PLAN:  36 yo with CRPS well controlled on gabapentin. Patient would like to remain on this dose as it is working well. No indication for further work-up. Discussed the diagnosis of CRPS and why it happens and how to treat it. Answered patient's questions. Refilling gabapentin which she is tolerating well. F/u as needed.       Encounter Diagnoses   Name Primary?   • Complex regional pain syndrome i of left lower limb    • Foot swelling      Orders Placed This Encounter   • gabapentin (NEURONTIN) 100 MG Cap          Abraham Mcgrath MD  Epilepsy and General Neurology  Department of Neurology  Instructor of Neurology [facility].   Office: [phone]  Fax: [phone]     BILLING DOCUMENTATION:       Counseling:  I spent a total of 26 minutes of face-to-face time in this visit. Over 50% of the time of the visit today was spent on counseling and or coordination of care wtih the patient and/or family, as above in assessment in plan.

## 2021-05-17 ENCOUNTER — APPOINTMENT (RX ONLY)
Dept: URBAN - METROPOLITAN AREA CLINIC 35 | Facility: CLINIC | Age: 36
Setting detail: DERMATOLOGY
End: 2021-05-17

## 2021-05-17 DIAGNOSIS — L82.0 INFLAMED SEBORRHEIC KERATOSIS: ICD-10-CM

## 2021-05-17 PROCEDURE — 99212 OFFICE O/P EST SF 10 MIN: CPT

## 2021-05-17 PROCEDURE — ? COUNSELING

## 2021-05-17 ASSESSMENT — LOCATION SIMPLE DESCRIPTION DERM: LOCATION SIMPLE: LEFT LOWER BACK

## 2021-05-17 ASSESSMENT — LOCATION ZONE DERM: LOCATION ZONE: TRUNK

## 2021-05-17 ASSESSMENT — LOCATION DETAILED DESCRIPTION DERM: LOCATION DETAILED: LEFT SUPERIOR MEDIAL MIDBACK

## 2021-07-01 ENCOUNTER — OFFICE VISIT (OUTPATIENT)
Dept: SLEEP MEDICINE | Facility: MEDICAL CENTER | Age: 36
End: 2021-07-01
Payer: COMMERCIAL

## 2021-07-01 VITALS
BODY MASS INDEX: 21.14 KG/M2 | HEIGHT: 71 IN | RESPIRATION RATE: 16 BRPM | WEIGHT: 151 LBS | OXYGEN SATURATION: 95 % | DIASTOLIC BLOOD PRESSURE: 72 MMHG | SYSTOLIC BLOOD PRESSURE: 114 MMHG | HEART RATE: 84 BPM

## 2021-07-01 DIAGNOSIS — G47.33 OSA (OBSTRUCTIVE SLEEP APNEA): ICD-10-CM

## 2021-07-01 PROCEDURE — 99213 OFFICE O/P EST LOW 20 MIN: CPT | Performed by: FAMILY MEDICINE

## 2021-07-01 ASSESSMENT — FIBROSIS 4 INDEX: FIB4 SCORE: 0.55

## 2021-07-01 NOTE — PROGRESS NOTES
"  [facility] Follow Up Note     Date: 7/1/2021 / Time: 11:45 AM    Patient ID:   Name:             Amelia Love   YOB: 1985  Age:                 36 y.o.  female   MRN:               [id number]      Thank you for requesting a sleep medicine consultation on Amelia Love at the sleep center. She presents today with the chief complaints of ENRRIQUE follow up.     HISTORY OF PRESENT ILLNESS:       Pt is currently on dental appliance. She goes to sleep around 8:30 pm and wakes up around 5 am. She is getting about 7+ hrs of sleep on a good night.The bad nights are rare. Overall, she doesnot finds her sleep refreshing.She rarely take a nap. The naps are usually 1-3 hr long.She drinks about 2-3 caffeinated beverages per day. She denies any symptoms of RLS, narcolepsy or any symptoms to suggest parasomnias such as nightmares, sleep walking or acting out of dreams. The symptoms of excessive daytime sleepiness and snoring has continued even with the OAT. She also has hard time tolerating it due to jaw pain and takes it off unintentionally during the night.     On this visit she first time mentioned about hearing a \" bang\" while she is falling asleep. It happens once every couple months and has been going on for last 2 year or so. It does not disturbs her sleep onset. Denies any associated HA, nausea, anxiety/depression, change in medications.    SLEEP HISTORY   PSG on 03/03/18 it was mild obstructive sleep apnea as shown by the apnea hypopnea index of 6.4/hr. There was a total of 5 apneas and 36 hypopneas. In the supine position the respiratory disturbance index was 12.6 an hour and in the non-supine position the respiratory disturbance index was 4.3 per hour. The respiratory events were associated with O2 brad of 91% and averahe O2 saturation of 95%      REVIEW OF SYSTEMS:       Constitutional: Denies fevers, Denies weight changes  Eyes: Denies changes in vision, no eye " pain  Ears/Nose/Throat/Mouth: Denies nasal congestion or sore throat   Cardiovascular: Denies chest pain or palpitations   Respiratory: Denies shortness of breath , Denies cough  Gastrointestinal/Hepatic: Denies abdominal pain, nausea  Skin/Breast: Denies rash,   Neurological: Denies headache, confusion\  Psychiatric: denies mood disorder   Sleep:+ snoring and non restful sleep    Comprehensive review of systems form is reviewed with the patient and is attached in the EMR.     PMH:  has a past medical history of Seasonal allergic rhinitis (11/15/2013).  MEDS:   Current Outpatient Medications:   •  levonorgestrel-ethinyl estradiol (SEASONALE) 0.15-0.03 MG per tablet, TAKE 1 TABLET DAILY, Disp: 91 tablet, Rfl: 1  •  gabapentin (NEURONTIN) 100 MG Cap, Take 1 Cap by mouth 3 times a day., Disp: 270 Each, Rfl: 3  •  gabapentin (NEURONTIN) 300 MG Cap, Take 1 Cap by mouth 3 times a day., Disp: 90 Cap, Rfl: 4  •  VITAMIN D PO, Take  by mouth., Disp: , Rfl:   •  mupirocin (BACTROBAN) 2 % Ointment, Apply 1 Application to affected area(s) 2 times a day. (Patient not taking: Reported on 10/28/2020), Disp: 22 g, Rfl: 0  •  Calcium-Magnesium-Vitamin D 600-300-400 Liquid, Take  by mouth., Disp: , Rfl:   •  aspirin EC (ECOTRIN) 325 MG Tablet Delayed Response, Take 1 Tab by mouth every day., Disp: 60 Tab, Rfl:   ALLERGIES:   Allergies   Allergen Reactions   • Minocycline    • Tetracycline      SURGHX:   Past Surgical History:   Procedure Laterality Date   • DENTAL EXTRACTION(S)       SOCHX:  reports that she has never smoked. She has never used smokeless tobacco. She reports current alcohol use of about 0.5 oz of alcohol per week. She reports that she does not use drugs..  FH:   Family History   Problem Relation Age of Onset   • Hypertension Father    • Hyperlipidemia Mother    • Other Mother 45        MS    • Thyroid Sister    • Cancer Maternal Grandmother 50        breast   • Thyroid Maternal Grandmother    • Allergies Sister    •  "Thyroid Maternal Aunt    • Other Maternal Aunt         Raynaud's    • Alcohol/Drug Neg Hx    • Diabetes Neg Hx    • Heart Disease Neg Hx    • Psychiatric Illness Neg Hx    • Clotting Disorder Neg Hx          Physical Exam:  Vitals/ General Appearance:   Weight/BMI: Body mass index is 21.06 kg/m².  Resp 16   Ht 1.803 m (5' 11\")   Wt 68.5 kg (151 lb)   Vitals:    07/01/21 1144   Resp: 16   Weight: 68.5 kg (151 lb)   Height: 1.803 m (5' 11\")       Pt. is alert and oriented to time, place and person. Cooperative and in no apparent distress.       Constitutional: Alert, no distress, well-groomed.  Skin: No rashes in visible areas.  Eye: Round. Conjunctiva clear, lids normal. No icterus.   ENMT: Lips pink without lesions, good dentition, moist mucous membranes. Phonation normal.  Neck: No masses, no thyromegaly. Moves freely without pain.  CV: Pulse as reported by patient  Respiratory: Unlabored respiratory effort, no cough or audible wheeze  Psych: Alert and oriented x3, normal affect and mood.     ASSESSMENT AND PLAN     1. Sleep Apnea      The pathophysiology of sleep anea and the increased risk of cardiovascular morbidity from untreated sleep apnea is discussed in detail with the patient. She is urged to avoid supine sleep, weight gain and alcoholic beverages since all of these can worsen sleep apnea. She is cautioned against drowsy driving. If She feels sleepy while driving, She must pull over for a break/nap, rather than persist on the road, in the interest of She own safety and that of others on the road.   Plan   - Continue mandibular advancement device   - however ordering ACPAP 5-15 cm    - compliance was reinforced     2. 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 There are no red flags based on the hx. It i    Regarding treatment of other past medical problems and general health maintenance,  She is urged to follow up with PCP.    "

## 2021-07-01 NOTE — PATIENT INSTRUCTIONS
"  CPAP and BPAP Information  CPAP and BPAP are methods of helping a person breathe with the use of air pressure. CPAP stands for \"continuous positive airway pressure.\" BPAP stands for \"bi-level positive airway pressure.\" In both methods, air is blown through your nose or mouth and into your air passages to help you breathe well.  CPAP and BPAP use different amounts of pressure to blow air. With CPAP, the amount of pressure stays the same while you breathe in and out. With BPAP, the amount of pressure is increased when you breathe in (inhale) so that you can take larger breaths. Your health care provider will recommend whether CPAP or BPAP would be more helpful for you.  Why are CPAP and BPAP treatments used?  CPAP or BPAP can be helpful if you have:  · Sleep apnea.  · Chronic obstructive pulmonary disease (COPD).  · Heart failure.  · Medical conditions that weaken the muscles of the chest including muscular dystrophy, or neurological diseases such as amyotrophic lateral sclerosis (ALS).  · Other problems that cause breathing to be weak, abnormal, or difficult.  CPAP is most commonly used for obstructive sleep apnea (ENRRIQUE) to keep the airways from collapsing when the muscles relax during sleep.  How is CPAP or BPAP administered?  Both CPAP and BPAP are provided by a small machine with a flexible plastic tube that attaches to a plastic mask. You wear the mask. Air is blown through the mask into your nose or mouth. The amount of pressure that is used to blow the air can be adjusted on the machine. Your health care provider will determine the pressure setting that should be used based on your individual needs.  When should CPAP or BPAP be used?  In most cases, the mask only needs to be worn during sleep. Generally, the mask needs to be worn throughout the night and during any daytime naps. People with certain medical conditions may also need to wear the mask at other times when they are awake. Follow instructions from " your health care provider about when to use the machine.  What are some tips for using the mask?    · Because the mask needs to be snug, some people feel trapped or closed-in (claustrophobic) when first using the mask. If you feel this way, you may need to get used to the mask. One way to do this is by holding the mask loosely over your nose or mouth and then gradually applying the mask more snugly. You can also gradually increase the amount of time that you use the mask.  · Masks are available in various types and sizes. Some fit over your mouth and nose while others fit over just your nose. If your mask does not fit well, talk with your health care provider about getting a different one.  · If you are using a mask that fits over your nose and you tend to breathe through your mouth, a chin strap may be applied to help keep your mouth closed.  · The CPAP and BPAP machines have alarms that may sound if the mask comes off or develops a leak.  · If you have trouble with the mask, it is very important that you talk with your health care provider about finding a way to make the mask easier to tolerate. Do not stop using the mask. Stopping the use of the mask could have a negative impact on your health.  What are some tips for using the machine?  · Place your CPAP or BPAP machine on a secure table or stand near an electrical outlet.  · Know where the on/off switch is located on the machine.  · Follow instructions from your health care provider about how to set the pressure on your machine and when you should use it.  · Do not eat or drink while the CPAP or BPAP machine is on. Food or fluids could get pushed into your lungs by the pressure of the CPAP or BPAP.  · Do not smoke. Tobacco smoke residue can damage the machine.  · For home use, CPAP and BPAP machines can be rented or purchased through home health care companies. Many different brands of machines are available. Renting a machine before purchasing may help you  find out which particular machine works well for you.  · Keep the CPAP or BPAP machine and attachments clean. Ask your health care provider for specific instructions.  Get help right away if:  · You have redness or open areas around your nose or mouth where the mask fits.  · You have trouble using the CPAP or BPAP machine.  · You cannot tolerate wearing the CPAP or BPAP mask.  · You have pain, discomfort, and bloating in your abdomen.  Summary  · CPAP and BPAP are methods of helping a person breathe with the use of air pressure.  · Both CPAP and BPAP are provided by a small machine with a flexible plastic tube that attaches to a plastic mask.  · If you have trouble with the mask, it is very important that you talk with your health care provider about finding a way to make the mask easier to tolerate.  This information is not intended to replace advice given to you by your health care provider. Make sure you discuss any questions you have with your health care provider.  Document Released: 09/15/2005 Document Revised: 04/08/2020 Document Reviewed: 11/06/2017  Elsevier Patient Education © 2020 [employer]

## 2021-07-08 DIAGNOSIS — G90.522 COMPLEX REGIONAL PAIN SYNDROME I OF LEFT LOWER LIMB: ICD-10-CM

## 2021-07-08 DIAGNOSIS — G62.9 NEUROPATHY: ICD-10-CM

## 2021-07-08 RX ORDER — GABAPENTIN 300 MG/1
300 CAPSULE ORAL 3 TIMES DAILY
Qty: 90 CAPSULE | Refills: 4 | Status: SHIPPED
Start: 2021-07-08 | End: 2022-09-12 | Stop reason: SDUPTHER

## 2021-08-05 ENCOUNTER — APPOINTMENT (RX ONLY)
Dept: URBAN - METROPOLITAN AREA CLINIC 35 | Facility: CLINIC | Age: 36
Setting detail: DERMATOLOGY
End: 2021-08-05

## 2021-08-05 DIAGNOSIS — L82.1 OTHER SEBORRHEIC KERATOSIS: ICD-10-CM

## 2021-08-05 PROCEDURE — ? COUNSELING

## 2021-08-05 PROCEDURE — 99212 OFFICE O/P EST SF 10 MIN: CPT

## 2021-08-05 ASSESSMENT — LOCATION DETAILED DESCRIPTION DERM
LOCATION DETAILED: LEFT LATERAL MALAR CHEEK
LOCATION DETAILED: RIGHT INFERIOR LATERAL FOREHEAD
LOCATION DETAILED: LEFT ANTERIOR SHOULDER

## 2021-08-05 ASSESSMENT — LOCATION SIMPLE DESCRIPTION DERM
LOCATION SIMPLE: LEFT CHEEK
LOCATION SIMPLE: RIGHT FOREHEAD
LOCATION SIMPLE: LEFT SHOULDER

## 2021-08-05 ASSESSMENT — LOCATION ZONE DERM
LOCATION ZONE: ARM
LOCATION ZONE: FACE

## 2021-08-18 ENCOUNTER — TELEPHONE (OUTPATIENT)
Dept: SLEEP MEDICINE | Facility: MEDICAL CENTER | Age: 36
End: 2021-08-18

## 2021-08-18 NOTE — TELEPHONE ENCOUNTER
Faxed CPAP order to [facility] 8/18/2021  Initial delay in processing the order was due to Dr. Winter's note not being signed. It has since been signed and the order has been completed.

## 2021-09-16 ENCOUNTER — HOSPITAL ENCOUNTER (OUTPATIENT)
Facility: MEDICAL CENTER | Age: 36
End: 2021-09-16
Attending: FAMILY MEDICINE
Payer: COMMERCIAL

## 2021-09-16 ENCOUNTER — OFFICE VISIT (OUTPATIENT)
Dept: MEDICAL GROUP | Facility: LAB | Age: 36
End: 2021-09-16
Payer: COMMERCIAL

## 2021-09-16 VITALS
WEIGHT: 153 LBS | BODY MASS INDEX: 21.42 KG/M2 | TEMPERATURE: 97.9 F | HEART RATE: 78 BPM | RESPIRATION RATE: 16 BRPM | OXYGEN SATURATION: 96 % | HEIGHT: 71 IN | SYSTOLIC BLOOD PRESSURE: 110 MMHG | DIASTOLIC BLOOD PRESSURE: 68 MMHG

## 2021-09-16 DIAGNOSIS — Z30.40 CONTRACEPTIVE SURVEILLANCE: ICD-10-CM

## 2021-09-16 DIAGNOSIS — Z01.419 WELL WOMAN EXAM WITH ROUTINE GYNECOLOGICAL EXAM: ICD-10-CM

## 2021-09-16 DIAGNOSIS — B97.7 HPV IN FEMALE: ICD-10-CM

## 2021-09-16 PROCEDURE — 99395 PREV VISIT EST AGE 18-39: CPT | Performed by: FAMILY MEDICINE

## 2021-09-16 PROCEDURE — 88175 CYTOPATH C/V AUTO FLUID REDO: CPT

## 2021-09-16 PROCEDURE — 87624 HPV HI-RISK TYP POOLED RSLT: CPT

## 2021-09-16 RX ORDER — LEVONORGESTREL AND ETHINYL ESTRADIOL 0.15-0.03
KIT ORAL
Qty: 91 TABLET | Refills: 3 | Status: SHIPPED | OUTPATIENT
Start: 2021-09-16 | End: 2022-09-14 | Stop reason: SDUPTHER

## 2021-09-16 ASSESSMENT — FIBROSIS 4 INDEX: FIB4 SCORE: 0.55

## 2021-09-16 ASSESSMENT — PATIENT HEALTH QUESTIONNAIRE - PHQ9: CLINICAL INTERPRETATION OF PHQ2 SCORE: 0

## 2021-09-16 NOTE — ASSESSMENT & PLAN NOTE
Negative Negative  Negative  Negative    HPV Genotype 18 Negative Negative  Negative  Negative    HPV Other High Risk Genotypes Negative POSITIVE Abnormal   Negative CM  Negative

## 2021-09-16 NOTE — PATIENT INSTRUCTIONS
Preventive Care 21-39 Years Old, Female  Preventive care refers to visits with your health care provider and lifestyle choices that can promote health and wellness. This includes:  · A yearly physical exam. This may also be called an annual well check.  · Regular dental visits and eye exams.  · Immunizations.  · Screening for certain conditions.  · Healthy lifestyle choices, such as eating a healthy diet, getting regular exercise, not using drugs or products that contain nicotine and tobacco, and limiting alcohol use.  What can I expect for my preventive care visit?  Physical exam  Your health care provider will check your:  · Height and weight. This may be used to calculate body mass index (BMI), which tells if you are at a healthy weight.  · Heart rate and blood pressure.  · Skin for abnormal spots.  Counseling  Your health care provider may ask you questions about your:  · Alcohol, tobacco, and drug use.  · Emotional well-being.  · Home and relationship well-being.  · Sexual activity.  · Eating habits.  · Work and work environment.  · Method of birth control.  · Menstrual cycle.  · Pregnancy history.  What immunizations do I need?    Influenza (flu) vaccine  · This is recommended every year.  Tetanus, diphtheria, and pertussis (Tdap) vaccine  · You may need a Td booster every 10 years.  Varicella (chickenpox) vaccine  · You may need this if you have not been vaccinated.  Human papillomavirus (HPV) vaccine  · If recommended by your health care provider, you may need three doses over 6 months.  Measles, mumps, and rubella (MMR) vaccine  · You may need at least one dose of MMR. You may also need a second dose.  Meningococcal conjugate (MenACWY) vaccine  · One dose is recommended if you are age 19-21 years and a first-year college student living in a residence [place], or if you have one of several medical conditions. You may also need additional booster doses.  Pneumococcal conjugate (PCV13) vaccine  · You may need  this if you have certain conditions and were not previously vaccinated.  Pneumococcal polysaccharide (PPSV23) vaccine  · You may need one or two doses if you smoke cigarettes or if you have certain conditions.  Hepatitis A vaccine  · You may need this if you have certain conditions or if you travel or work in places where you may be exposed to hepatitis A.  Hepatitis B vaccine  · You may need this if you have certain conditions or if you travel or work in places where you may be exposed to hepatitis B.  Haemophilus influenzae type b (Hib) vaccine  · You may need this if you have certain conditions.  You may receive vaccines as individual doses or as more than one vaccine together in one shot (combination vaccines). Talk with your health care provider about the risks and benefits of combination vaccines.  What tests do I need?    Blood tests  · Lipid and cholesterol levels. These may be checked every 5 years starting at age 20.  · Hepatitis C test.  · Hepatitis B test.  Screening  · Diabetes screening. This is done by checking your blood sugar (glucose) after you have not eaten for a while (fasting).  · Sexually transmitted disease (STD) testing.  · BRCA-related cancer screening. This may be done if you have a family history of breast, ovarian, tubal, or peritoneal cancers.  · Pelvic exam and Pap test. This may be done every 3 years starting at age 21. Starting at age 30, this may be done every 5 years if you have a Pap test in combination with an HPV test.  Talk with your health care provider about your test results, treatment options, and if necessary, the need for more tests.  Follow these instructions at home:  Eating and drinking    · Eat a diet that includes fresh fruits and vegetables, whole grains, lean protein, and low-fat dairy.  · Take vitamin and mineral supplements as recommended by your health care provider.  · Do not drink alcohol if:  ? Your health care provider tells you not to drink.  ? You are  pregnant, may be pregnant, or are planning to become pregnant.  · If you drink alcohol:  ? Limit how much you have to 0-1 drink a day.  ? Be aware of how much alcohol is in your drink. In the [place] one drink equals one 12 oz bottle of beer (355 mL), one 5 oz glass of wine (148 mL), or one 1½ oz glass of hard liquor (44 mL).  Lifestyle  · Take daily care of your teeth and gums.  · Stay active. Exercise for at least 30 minutes on 5 or more days each week.  · Do not use any products that contain nicotine or tobacco, such as cigarettes, e-cigarettes, and chewing tobacco. If you need help quitting, ask your health care provider.  · If you are sexually active, practice safe sex. Use a condom or other form of birth control (contraception) in order to prevent pregnancy and STIs (sexually transmitted infections). If you plan to become pregnant, see your health care provider for a preconception visit.  What's next?  · Visit your health care provider once a year for a well check visit.  · Ask your health care provider how often you should have your eyes and teeth checked.  · Stay up to date on all vaccines.  This information is not intended to replace advice given to you by your health care provider. Make sure you discuss any questions you have with your health care provider.  Document Released: 02/13/2003 Document Revised: 08/29/2019 Document Reviewed: 08/29/2019  Elsevier Patient Education © 2020 Elsevier Inc.

## 2021-09-16 NOTE — PROGRESS NOTES
SUBJECTIVE:   Chief Complaint   Patient presents with   • Annual Exam   • Gynecologic Exam       36 y.o. female for annual routine gynecologic exam    OB History    Para Term  AB Living   0 0 0 0 0 0   SAB TAB Ectopic Molar Multiple Live Births   0 0 0 0 0 0      Social History     Substance and Sexual Activity   Sexual Activity Yes   • Partners: Male   • Birth control/protection: Pill       Last Pap: Last year  HPV testing positive for high risk that was not 16 or 18  No significant bloating/fluid retention, pelvic pain, or dyspareunia. No vaginal discharge   No breast tenderness, mass, nipple discharge, changes in size or contour, or abnormal cyclic discomfort.        ROS:    No urinary tract symptoms, no incontinence.   No abdominal pain, change in bowel habits, black or bloody stools.    No unusual headaches, no visual changes, menstrual migraines   No prolonged cough. No dyspnea or chest pain on exertion.  No depression, labile mood, anxiety ,libido changes, insomnia.  No new/concerning skin lesions,    Social History     Tobacco Use   • Smoking status: Never Smoker   • Smokeless tobacco: Never Used   Vaping Use   • Vaping Use: Never used   Substance Use Topics   • Alcohol use: Yes     Alcohol/week: 0.5 oz     Types: 1 Glasses of wine per week     Comment: occasional   • Drug use: No       Patient Active Problem List    Diagnosis Date Noted   • HPV in female 2021   • Cellulitis of left lower extremity 2020   • Nail deformity 2020   • Dyslipidemia 10/10/2019   • Encounter for surveillance of contraceptive pills 2019   • Family history of hypothyroidism 2018   • ENRRIQUE (obstructive sleep apnea) 2018   • Subclinical hypothyroidism 08/15/2017   • Abnormal uterine bleeding 2016   • Multiple nevi 2016       Past Medical History:   Diagnosis Date   • Seasonal allergic rhinitis 11/15/2013     Past Surgical History:   Procedure Laterality Date   • DENTAL  "EXTRACTION(S)           Family History   Problem Relation Age of Onset   • Hypertension Father    • Hyperlipidemia Mother    • Other Mother 45        MS    • Thyroid Sister    • Cancer Maternal Grandmother 50        breast   • Thyroid Maternal Grandmother    • Allergies Sister    • Thyroid Maternal Aunt    • Other Maternal Aunt         Raynaud's    • Alcohol/Drug Neg Hx    • Diabetes Neg Hx    • Heart Disease Neg Hx    • Psychiatric Illness Neg Hx    • Clotting Disorder Neg Hx      Family Status   Relation Name Status   • Fa  Alive   • Mo  Alive   • Sis  Alive   • Sis  Alive   • MGMo  (Not Specified)   • Sis  (Not Specified)   • MAunt  Alive   • Neg Hx  (Not Specified)         Current medicines (including changes today)  Current Outpatient Medications   Medication Sig Dispense Refill   • levonorgestrel-ethinyl estradiol (SEASONALE) 0.15-0.03 MG per tablet TAKE 1 TABLET DAILY 91 Tablet 3   • gabapentin (NEURONTIN) 300 MG Cap Take 1 capsule by mouth 3 times a day. 90 capsule 4   • gabapentin (NEURONTIN) 100 MG Cap Take 1 Cap by mouth 3 times a day. 270 Each 3   • VITAMIN D PO Take  by mouth.       No current facility-administered medications for this visit.           Allergies: Minocycline and Tetracycline     Preventive Care:  Health Maintenance Topics with due status: Overdue       Topic Date Due    IMM DTaP/Tdap/Td Vaccine 01/01/2021    IMM INFLUENZA 09/01/2021       OBJECTIVE:   /68 (BP Location: Right arm, Patient Position: Sitting, BP Cuff Size: Adult)   Pulse 78   Temp 36.6 °C (97.9 °F)   Resp 16   Ht 1.803 m (5' 11\")   Wt 69.4 kg (153 lb)   SpO2 96%   BMI 21.34 kg/m²   Body mass index is 21.34 kg/m².      Gen: Well developed, well nourished in no acute distress.   Skin: Pink, warm, and dry. No rashes  Eyes: conjunctiva non-injected, sclera non-icteric. EOMs intact.   Nasal mucosa without edema nor erythema. No facial tenderness  Ears:Pinna normal. TM pearly gray.   Nose: Nares patent.  No " discharge.  Oral mucous membranes pink and moist with no lesions.  Neck: Supple, trachea midline. No adenopathy or masses in the neck or supraclavicular regions. No thyromegaly.  Lungs: Effort is normal. Clear to auscultation bilaterally with good excursion.  CV: regular rate and rhythm. No murmur.  Abdomen: soft, nontender, + BS. No HSM.  No CVAT  Ext: no edema, color normal, vascularity normal, temperature normal  Alert and oriented Eye contact is good, speech goal directed, affect calm     Breast Exam: Performed with instruction during examination. No axillary lymphadenopathy, no skin changes, no dominant masses. No nipple retraction  Pelvic Exam:  Normal external genitalia with no lesions. Normal vaginal mucosa with normal rugation and no discharge. Cervix with no visible lesions. No cervical motion tenderness. Uterus is normal sized with no masses. No adnexal tenderness or enlargement appreciated. Pap is obtained and the specimen was sent to lab      <ASSESSMENT and PLAN>  1. Well woman exam with routine gynecological exam  CBC WITH DIFFERENTIAL    Comp Metabolic Panel    Lipid Profile    TSH    FREE THYROXINE    THINPREP PAP WITH HPV   2. HPV in female  THINPREP PAP WITH HPV   3. Contraceptive surveillance  levonorgestrel-ethinyl estradiol (SEASONALE) 0.15-0.03 MG per tablet       Discussed  breast self exam, use and side effects of OCP's, adequate intake of calcium and vitamin D, diet and exercise       Return in about 1 year (around 9/16/2022).

## 2021-09-17 DIAGNOSIS — Z01.419 WELL WOMAN EXAM WITH ROUTINE GYNECOLOGICAL EXAM: ICD-10-CM

## 2021-09-17 DIAGNOSIS — B97.7 HPV IN FEMALE: ICD-10-CM

## 2021-09-17 LAB
CYTOLOGY REG CYTOL: NORMAL
HPV HR 12 DNA CVX QL NAA+PROBE: NEGATIVE
HPV16 DNA SPEC QL NAA+PROBE: NEGATIVE
HPV18 DNA SPEC QL NAA+PROBE: NEGATIVE
SPECIMEN SOURCE: NORMAL

## 2021-10-12 ENCOUNTER — APPOINTMENT (OUTPATIENT)
Dept: SLEEP MEDICINE | Facility: MEDICAL CENTER | Age: 36
End: 2021-10-12
Payer: COMMERCIAL

## 2021-10-13 ENCOUNTER — PATIENT MESSAGE (OUTPATIENT)
Dept: SLEEP MEDICINE | Facility: MEDICAL CENTER | Age: 36
End: 2021-10-13

## 2021-10-13 ENCOUNTER — TELEPHONE (OUTPATIENT)
Dept: SLEEP MEDICINE | Facility: MEDICAL CENTER | Age: 36
End: 2021-10-13

## 2021-10-13 DIAGNOSIS — G47.33 OSA (OBSTRUCTIVE SLEEP APNEA): ICD-10-CM

## 2021-10-13 NOTE — TELEPHONE ENCOUNTER
From: Amelia Love  To: Physician Allyson Winter  Sent: 10/13/2021 12:42 PM PDT  Subject: CPAP appt     I cancelled my follow-up appt that was scheduled for yesterday since I have yet to receive my CPAP machine from [facility]. I had called them and they said they were still working on my order. Should I wait to schedule my follow up until I receive my CPAP or go ahead and schedule the follow up with Dr. Winter now? Please advise, as I believe there was some restrictions with insurance on the length between receiving the machine and the follow-up? Thank you.

## 2021-10-13 NOTE — TELEPHONE ENCOUNTER
[facility] denied CPAP due to AHI of 6.4 no documentation of stroke,HTN, mood disorder, insomnia, excessive daytime sleepiness, or greater then 20 episodes of o2 desats.    Please let me know how you want to proceed.

## 2021-11-17 NOTE — TELEPHONE ENCOUNTER
I have yet to get a call back about this P2P, have you?    Would you rather proceed with more testing?

## 2021-12-27 NOTE — PATIENT COMMUNICATION
called pt and schedule HST on 1/27/2022 and informed pt of  and drop off time. do you want me to schedule the pt an OV or will results be given through [email or web address]?

## 2022-01-14 ENCOUNTER — APPOINTMENT (OUTPATIENT)
Dept: MEDICAL GROUP | Facility: LAB | Age: 37
End: 2022-01-14
Payer: COMMERCIAL

## 2022-01-14 ENCOUNTER — OFFICE VISIT (OUTPATIENT)
Dept: MEDICAL GROUP | Facility: LAB | Age: 37
End: 2022-01-14
Payer: COMMERCIAL

## 2022-01-14 ENCOUNTER — HOSPITAL ENCOUNTER (OUTPATIENT)
Facility: MEDICAL CENTER | Age: 37
End: 2022-01-14
Attending: FAMILY MEDICINE
Payer: COMMERCIAL

## 2022-01-14 VITALS
TEMPERATURE: 98.1 F | OXYGEN SATURATION: 99 % | WEIGHT: 154 LBS | SYSTOLIC BLOOD PRESSURE: 110 MMHG | RESPIRATION RATE: 16 BRPM | HEART RATE: 97 BPM | HEIGHT: 71 IN | DIASTOLIC BLOOD PRESSURE: 70 MMHG | BODY MASS INDEX: 21.56 KG/M2

## 2022-01-14 DIAGNOSIS — R07.0 THROAT PAIN: ICD-10-CM

## 2022-01-14 PROCEDURE — 99214 OFFICE O/P EST MOD 30 MIN: CPT | Performed by: FAMILY MEDICINE

## 2022-01-14 PROCEDURE — U0003 INFECTIOUS AGENT DETECTION BY NUCLEIC ACID (DNA OR RNA); SEVERE ACUTE RESPIRATORY SYNDROME CORONAVIRUS 2 (SARS-COV-2) (CORONAVIRUS DISEASE [COVID-19]), AMPLIFIED PROBE TECHNIQUE, MAKING USE OF HIGH THROUGHPUT TECHNOLOGIES AS DESCRIBED BY CMS-2020-01-R: HCPCS

## 2022-01-14 PROCEDURE — U0005 INFEC AGEN DETEC AMPLI PROBE: HCPCS

## 2022-01-14 RX ORDER — ACETAMINOPHEN 325 MG/1
650 TABLET ORAL EVERY 6 HOURS PRN
Qty: 30 TABLET | Refills: 1 | Status: SHIPPED | OUTPATIENT
Start: 2022-01-14 | End: 2022-09-14

## 2022-01-14 ASSESSMENT — FIBROSIS 4 INDEX: FIB4 SCORE: 0.55

## 2022-01-15 DIAGNOSIS — R07.0 THROAT PAIN: ICD-10-CM

## 2022-01-15 LAB — COVID ORDER STATUS COVID19: NORMAL

## 2022-01-15 NOTE — PROGRESS NOTES
Chief Complaint:   Chief Complaint   Patient presents with   • Pharyngitis     x6 days       HPI: Established patient, new to me  Amelia Love is a 36 y.o. fe/male who presents for new concern for evaluation of throat pain    1. Throat pain  Patient reports she started to have the symptoms 5 days ago on Sunday, January 9, 2022, started with throat pain and discomfort, she went on Monday, January 10 and tested for COVID and it came back as negative. She continues to have the throat pain and discomfort, and her  is complaining of a similar complaint. Denies fever or GI symptoms, denies shortness of breath or chest pain or cough. No personal contact with a patient with COVID.        Past medical history, family history, social history and medications reviewed and updated in the record. Today  Current medications, problem list and allergies reviewed in EPIC today  Health maintenance topics are reviewed and updated.    Patient Active Problem List    Diagnosis Date Noted   • HPV in female 09/16/2021   • Cellulitis of left lower extremity 01/02/2020   • Nail deformity 01/02/2020   • Dyslipidemia 10/10/2019   • Encounter for surveillance of contraceptive pills 03/13/2019   • Family history of hypothyroidism 08/14/2018   • ENRRIQUE (obstructive sleep apnea) 05/02/2018   • Subclinical hypothyroidism 08/15/2017   • Abnormal uterine bleeding 11/18/2016   • Multiple nevi 11/18/2016     Family History   Problem Relation Age of Onset   • Hypertension Father    • Hyperlipidemia Mother    • Other Mother 45        MS    • Thyroid Sister    • Cancer Maternal Grandmother 50        breast   • Thyroid Maternal Grandmother    • Allergies Sister    • Thyroid Maternal Aunt    • Other Maternal Aunt         Raynaud's    • Alcohol/Drug Neg Hx    • Diabetes Neg Hx    • Heart Disease Neg Hx    • Psychiatric Illness Neg Hx    • Clotting Disorder Neg Hx      Social History     Socioeconomic History   • Marital status:      Spouse  name: Not on file   • Number of children: Not on file   • Years of education: Not on file   • Highest education level: Master's degree (e.g., MA, MS, [place], MEd, MSW, [employer])   Occupational History   • Not on file   Tobacco Use   • Smoking status: Never Smoker   • Smokeless tobacco: Never Used   Vaping Use   • Vaping Use: Never used   Substance and Sexual Activity   • Alcohol use: Yes     Alcohol/week: 0.5 oz     Types: 1 Glasses of wine per week     Comment: occasional   • Drug use: No   • Sexual activity: Yes     Partners: Male     Birth control/protection: Pill   Other Topics Concern   • Not on file   Social History Narrative    Lives with her partner Willy for 1 y.    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     Social Determinants of Health     Financial Resource Strain: Low Risk    • Difficulty of Paying Living Expenses: Not hard at all   Food Insecurity: No Food Insecurity   • Worried About Running Out of Food in the Last Year: Never true   • Ran Out of Food in the Last Year: Never true   Transportation Needs: No Transportation Needs   • Lack of Transportation (Medical): No   • Lack of Transportation (Non-Medical): No   Physical Activity: Insufficiently Active   • Days of Exercise per Week: 3 days   • Minutes of Exercise per Session: 20 min   Stress: Stress Concern Present   • Feeling of Stress : To some extent   Social Connections: Moderately Isolated   • Frequency of Communication with Friends and Family: Twice a week   • Frequency of Social Gatherings with Friends and Family: Once a week   • Attends [employer] Services: Never   • Active Member of Clubs or Organizations: No   • Attends Club or Organization Meetings: Never   • Marital Status:    Intimate Partner Violence:    • Fear of Current or Ex-Partner: Not on file   • Emotionally Abused: Not on file   • Physically Abused: Not on file   • Sexually Abused: Not on file   Housing Stability: Low Risk    • Unable to Pay for Housing in  "the Last Year: No   • Number of Places Lived in the Last Year: 1   • Unstable Housing in the Last Year: No       Current Outpatient Medications   Medication Sig Dispense Refill   • acetaminophen (TYLENOL) 325 MG Tab Take 2 Tablets by mouth every 6 hours as needed for Moderate Pain. 30 Tablet 1   • levonorgestrel-ethinyl estradiol (SEASONALE) 0.15-0.03 MG per tablet TAKE 1 TABLET DAILY 91 Tablet 3   • gabapentin (NEURONTIN) 300 MG Cap Take 1 capsule by mouth 3 times a day. 90 capsule 4   • gabapentin (NEURONTIN) 100 MG Cap Take 1 Cap by mouth 3 times a day. 270 Each 3   • VITAMIN D PO Take  by mouth.       No current facility-administered medications for this visit.         Review Of Systems  As documented in HPI above  PHYSICAL EXAMINATION:    /70 (BP Location: Right arm, Patient Position: Sitting, BP Cuff Size: Adult)   Pulse 97   Temp 36.7 °C (98.1 °F) (Temporal)   Resp 16   Ht 1.803 m (5' 11\")   Wt 69.9 kg (154 lb)   SpO2 99%   BMI 21.48 kg/m²   Gen.: Well-developed, well-nourished, no apparent distress, pleasant and cooperative with the examination  HEENT: Normocephalic/atraumatic, sinuses nontender with palpation, TMs clear, nares patent with pink mucosa and clear rhinorrhea, noted mild pharyngeal erythema  Neck: No JVD or bruits, no adenopathy  Cor: Regular rate and rhythm without murmur gallop or rub  Lungs: Clear to auscultation with equal breath sounds bilaterally. No wheezes, rhonchi.  Abdomen: Soft nontender without hepatosplenomegaly or masses appreciated, normoactive bowel sounds  Extremities: No cyanosis, clubbing or edema          ASSESSMENT/Plan:  1. Throat pain   new concern, most likely pharyngitis related to viral infection. Negative strep throat swab at the office, and negative rapid COVID test. We will also send it for PCR.     Discussed with the patient supportive care, push fluids hydration keep her throat moisturized by using lozenges and Tylenol every 6 hours and follow-up as " needed and directed if not getting better.       Please note that this dictation was created using voice recognition software. I have made every reasonable attempt to correct obvious errors but there may be errors of grammar and content that I may have overlooked prior to finalization of this note.

## 2022-01-16 LAB
SARS-COV-2 RNA RESP QL NAA+PROBE: NOTDETECTED
SPECIMEN SOURCE: NORMAL

## 2022-01-17 ENCOUNTER — APPOINTMENT (RX ONLY)
Dept: URBAN - METROPOLITAN AREA CLINIC 35 | Facility: CLINIC | Age: 37
Setting detail: DERMATOLOGY
End: 2022-01-17

## 2022-01-17 DIAGNOSIS — L56.5 DISSEMINATED SUPERFICIAL ACTINIC POROKERATOSIS (DSAP): ICD-10-CM

## 2022-01-17 DIAGNOSIS — D22 MELANOCYTIC NEVI: ICD-10-CM

## 2022-01-17 DIAGNOSIS — Z71.89 OTHER SPECIFIED COUNSELING: ICD-10-CM

## 2022-01-17 DIAGNOSIS — D485 NEOPLASM OF UNCERTAIN BEHAVIOR OF SKIN: ICD-10-CM

## 2022-01-17 DIAGNOSIS — D18.0 HEMANGIOMA: ICD-10-CM

## 2022-01-17 DIAGNOSIS — L81.4 OTHER MELANIN HYPERPIGMENTATION: ICD-10-CM

## 2022-01-17 DIAGNOSIS — L82.1 OTHER SEBORRHEIC KERATOSIS: ICD-10-CM

## 2022-01-17 PROBLEM — D22.72 MELANOCYTIC NEVI OF LEFT LOWER LIMB, INCLUDING HIP: Status: ACTIVE | Noted: 2022-01-17

## 2022-01-17 PROBLEM — D22.5 MELANOCYTIC NEVI OF TRUNK: Status: ACTIVE | Noted: 2022-01-17

## 2022-01-17 PROBLEM — D23.62 OTHER BENIGN NEOPLASM OF SKIN OF LEFT UPPER LIMB, INCLUDING SHOULDER: Status: ACTIVE | Noted: 2022-01-17

## 2022-01-17 PROBLEM — D48.5 NEOPLASM OF UNCERTAIN BEHAVIOR OF SKIN: Status: ACTIVE | Noted: 2022-01-17

## 2022-01-17 PROBLEM — D18.01 HEMANGIOMA OF SKIN AND SUBCUTANEOUS TISSUE: Status: ACTIVE | Noted: 2022-01-17

## 2022-01-17 PROCEDURE — ? COUNSELING

## 2022-01-17 PROCEDURE — ? TREATMENT REGIMEN

## 2022-01-17 PROCEDURE — 99213 OFFICE O/P EST LOW 20 MIN: CPT

## 2022-01-17 PROCEDURE — ? PRESCRIPTION

## 2022-01-17 PROCEDURE — ? OBSERVATION AND MEASURE

## 2022-01-17 RX ORDER — FLUOROURACIL 5 MG/G
THIN LAYER CREAM TOPICAL BID
Qty: 40 | Refills: 0 | Status: ERX | COMMUNITY
Start: 2022-01-17

## 2022-01-17 RX ADMIN — FLUOROURACIL THIN LAYER: 5 CREAM TOPICAL at 00:00

## 2022-01-17 ASSESSMENT — LOCATION DETAILED DESCRIPTION DERM
LOCATION DETAILED: LEFT MEDIAL UPPER BACK
LOCATION DETAILED: RIGHT LATERAL BREAST 6-7:00 REGION
LOCATION DETAILED: LEFT RIB CAGE
LOCATION DETAILED: LEFT ACHILLES SKIN
LOCATION DETAILED: LEFT DISTAL CALF
LOCATION DETAILED: LEFT MEDIAL SUPERIOR CHEST
LOCATION DETAILED: LEFT DISTAL DORSAL FOREARM
LOCATION DETAILED: MID TRAPEZIAL NECK
LOCATION DETAILED: RIGHT PROXIMAL DORSAL FOREARM
LOCATION DETAILED: EPIGASTRIC SKIN
LOCATION DETAILED: PERIUMBILICAL SKIN

## 2022-01-17 ASSESSMENT — LOCATION ZONE DERM
LOCATION ZONE: LEG
LOCATION ZONE: NECK
LOCATION ZONE: TRUNK
LOCATION ZONE: ARM

## 2022-01-17 ASSESSMENT — LOCATION SIMPLE DESCRIPTION DERM
LOCATION SIMPLE: LEFT ACHILLES SKIN
LOCATION SIMPLE: LEFT UPPER BACK
LOCATION SIMPLE: ABDOMEN
LOCATION SIMPLE: CHEST
LOCATION SIMPLE: LEFT FOREARM
LOCATION SIMPLE: TRAPEZIAL NECK
LOCATION SIMPLE: RIGHT FOREARM
LOCATION SIMPLE: RIGHT BREAST
LOCATION SIMPLE: LEFT CALF

## 2022-01-27 ENCOUNTER — HOME STUDY (OUTPATIENT)
Dept: SLEEP MEDICINE | Facility: MEDICAL CENTER | Age: 37
End: 2022-01-27
Attending: FAMILY MEDICINE
Payer: COMMERCIAL

## 2022-01-27 DIAGNOSIS — G47.33 OSA (OBSTRUCTIVE SLEEP APNEA): ICD-10-CM

## 2022-01-27 PROCEDURE — 95806 SLEEP STUDY UNATT&RESP EFFT: CPT | Performed by: FAMILY MEDICINE

## 2022-02-01 NOTE — PROCEDURES
DIAGNOSTIC HOME SLEEP TEST (HST) REPORT       PATIENT ID:  NAME:  Amelia Love  MRN:               [id number]  YOB: 1985  DATE OF STUDY: 1/27/2022      Impression:     This study shows evidence of:     1.Normal Respiratory Event Index (RACHEL) of 3.0 per hour and worse in supine sleep with RACHEL at 4.0. These findings are based on the recording time (flow evaluation time). It is not possible with this device to determine a traditional apnea+hypopnea index (AHI) for total sleep time since EEG channels are not available.     2. O2 Sat. brad was 92% and mean O2 sat was 95% and baseline O2 at 997 %. O2 sat was below 88% for 0% of the flow evaluation time. Oxygen Desaturation (>=3%) Index was elevated at 3.3/hr. AVG HR was 64 BPM.      TECHNICAL DESCRIPTION:  Matcha Device used was a type-III home study device. Home sleep study recording included: Airflow recording by nasal pressure transducer; Respiratory Effort by abdominal Respiratory Bands; O2 by finger oximetry. A position sensor and a snore channel was also used.    Scoring Criteria: A modification of the the AASM Manual for the Scoring of Sleep and Associated Events, 2012, was used.   Obstructive apnea was scored by cessation of airflow for at least 10 seconds with continuing respiratory effort.  Central apnea was scored by cessation of airflow for at least 10 seconds with no effort.  Hypopnea was scored by a 30% or more reduction in airflow for at least 10 seconds accompanied by an arterial oxygen desaturation of 3% or more.  (For Medicare patients, hypopneas were scored by a 30% or more reduction in airflow for at least 10 seconds accompanied by an arterial oxygen saturation of 4% or more, as required by their insurance, CMS.        General sleep summary: . Total recording time is 8 hours and 37 minutes and total flow evaluation time is 8 hours and 25 minutes. The patient spent 5 hours and 30 minutes in the supine  position.    Respiratory events:    Apneas: 0 (Obstructive apnea index 0/hr, Central apnea index 0 /hr, mixed 0 /hour)  Hypopneas: 25    Recommendations:    1. If there is a high suspicion for sleep disordered breathing, consider in-lab PSG.  2. In general patients with sleep apnea are advised to avoid alcohol and sedatives and to not operate a motor vehicle while drowsy. In some cases alternative treatment options may prove effective in resolving sleep apnea in these options include upper airway surgery, the use of a dental orthotic or weight loss and positional therapy. Clinical correlation is required.         Allyson Winter MD

## 2022-02-07 ENCOUNTER — OFFICE VISIT (OUTPATIENT)
Dept: SLEEP MEDICINE | Facility: MEDICAL CENTER | Age: 37
End: 2022-02-07
Payer: COMMERCIAL

## 2022-02-07 VITALS
BODY MASS INDEX: 21.56 KG/M2 | WEIGHT: 154 LBS | OXYGEN SATURATION: 93 % | HEART RATE: 87 BPM | HEIGHT: 71 IN | DIASTOLIC BLOOD PRESSURE: 82 MMHG | SYSTOLIC BLOOD PRESSURE: 122 MMHG | RESPIRATION RATE: 16 BRPM

## 2022-02-07 DIAGNOSIS — G47.33 OSA (OBSTRUCTIVE SLEEP APNEA): ICD-10-CM

## 2022-02-07 PROCEDURE — 99213 OFFICE O/P EST LOW 20 MIN: CPT | Performed by: FAMILY MEDICINE

## 2022-02-07 ASSESSMENT — PATIENT HEALTH QUESTIONNAIRE - PHQ9: CLINICAL INTERPRETATION OF PHQ2 SCORE: 0

## 2022-02-07 ASSESSMENT — FIBROSIS 4 INDEX: FIB4 SCORE: 0.55

## 2022-02-07 NOTE — PROGRESS NOTES
[facility] Follow Up Note     Date: 2/7/2022 / Time: 10:12 AM    Patient ID:   Name:             Amelia Love   YOB: 1985  Age:                 36 y.o.  female   MRN:               [id number]      Thank you for requesting a sleep medicine consultation on Amelia Love at the sleep center. She presents today with the chief complaints of ENRRIQUE follow up.     HISTORY OF PRESENT ILLNESS:       Pt is currently not on therapy. She goes to sleep around 8:30 pm and wakes up around 4:30-8 am. She is getting about 8 hrs of sleep on a good night. Overall, she doesnot finds her sleep refreshing.She denies any symptoms of RLS, narcolepsy or any symptoms to suggest parasomnias such as nightmares, sleep walking or acting out of dreams. The symptoms of excessive daytime sleepiness and snoring has continued even with the OAT. She has been using the breath right strip and her snoring has improved with breath right strip.     HST shows Normal Respiratory Event Index (RACHEL) of 3.0 per hour and worse in supine sleep with RACHEL at 4.0.  O2 Sat. brad was 92% and mean O2 sat was 95% and baseline O2 at 97 %. O2 sat was below 88% for 0% of the flow evaluation time. Oxygen Desaturation (>=3%) Index was elevated at 3.3/hr. AVG HR was 64 BPM.    SLEEP HISTORY   PSG on 03/03/18 it was mild obstructive sleep apnea as shown by the apnea hypopnea index of 6.4/hr. There was a total of 5 apneas and 36 hypopneas. In the supine position the respiratory disturbance index was 12.6 an hour and in the non-supine position the respiratory disturbance index was 4.3 per hour. The respiratory events were associated with O2 brad of 91% and averahe O2 saturation of 95%      REVIEW OF SYSTEMS:       Constitutional: Denies fevers, Denies weight changes  Eyes: Denies changes in vision, no eye pain  Ears/Nose/Throat/Mouth: Denies nasal congestion or sore throat   Cardiovascular: Denies chest pain or palpitations   Respiratory:  Denies shortness of breath , Denies cough  Gastrointestinal/Hepatic: Denies abdominal pain, nausea, vomiting, diarrhea, constipation or GI bleeding   Genitourinary: Deniesdysuria or frequency  Musculoskeletal/Rheum: Denies  joint pain and swelling   Skin/Breast: Denies rash,   Neurological: Denies headache, confusion, memory loss or focal weakness/parasthesias  Psychiatric: denies mood disorder   Sleep: improved snoring     Comprehensive review of systems form is reviewed with the patient and is attached in the EMR.     PMH:  has a past medical history of Seasonal allergic rhinitis (11/15/2013).  MEDS:   Current Outpatient Medications:   •  levonorgestrel-ethinyl estradiol (SEASONALE) 0.15-0.03 MG per tablet, TAKE 1 TABLET DAILY, Disp: 91 Tablet, Rfl: 3  •  gabapentin (NEURONTIN) 300 MG Cap, Take 1 capsule by mouth 3 times a day., Disp: 90 capsule, Rfl: 4  •  gabapentin (NEURONTIN) 100 MG Cap, Take 1 Cap by mouth 3 times a day., Disp: 270 Each, Rfl: 3  •  VITAMIN D PO, Take  by mouth., Disp: , Rfl:   •  acetaminophen (TYLENOL) 325 MG Tab, Take 2 Tablets by mouth every 6 hours as needed for Moderate Pain. (Patient not taking: Reported on 2/7/2022), Disp: 30 Tablet, Rfl: 1  ALLERGIES:   Allergies   Allergen Reactions   • Minocycline    • Tetracycline      SURGHX:   Past Surgical History:   Procedure Laterality Date   • DENTAL EXTRACTION(S)       SOCHX:  reports that she has never smoked. She has never used smokeless tobacco. She reports current alcohol use of about 0.5 oz of alcohol per week. She reports that she does not use drugs..  FH:   Family History   Problem Relation Age of Onset   • Hypertension Father    • Hyperlipidemia Mother    • Other Mother 45        MS    • Thyroid Sister    • Cancer Maternal Grandmother 50        breast   • Thyroid Maternal Grandmother    • Allergies Sister    • Thyroid Maternal Aunt    • Other Maternal Aunt         Raynaud's    • Alcohol/Drug Neg Hx    • Diabetes Neg Hx    • Heart  "Disease Neg Hx    • Psychiatric Illness Neg Hx    • Clotting Disorder Neg Hx          Physical Exam:  Vitals/ General Appearance:   Weight/BMI: Body mass index is 21.48 kg/m².  /82 (BP Location: Left arm, Patient Position: Sitting, BP Cuff Size: Adult)   Pulse 87   Resp 16   Ht 1.803 m (5' 11\")   Wt 69.9 kg (154 lb)   SpO2 93%   Vitals:    02/07/22 0957   BP: 122/82   BP Location: Left arm   Patient Position: Sitting   BP Cuff Size: Adult   Pulse: 87   Resp: 16   SpO2: 93%   Weight: 69.9 kg (154 lb)   Height: 1.803 m (5' 11\")       Pt. is alert and oriented to time, place and person. Cooperative and in no apparent distress.       Constitutional: Alert, no distress, well-groomed.  Skin: No rashes in visible areas.  Eye: Round. Conjunctiva clear, lids normal. No icterus.   ENMT: Lips pink without lesions, good dentition, moist mucous membranes. Phonation normal.  Neck: No masses, no thyromegaly. Moves freely without pain.  CV: Pulse as reported by patient  Respiratory: Unlabored respiratory effort, no cough or audible wheeze  Psych: Alert and oriented x3, normal affect and mood.     ASSESSMENT AND PLAN     1. History of sleep apnea however based on recent home sleep study it has resolved however she continues to have positional hypopneas and snoring.The pathophysiology of sleep anea and the increased risk of cardiovascular morbidity from untreated sleep apnea is discussed in detail with the patient.    She is urged to avoid supine sleep, weight gain and alcoholic beverages since all of these can worsen sleep apnea. She is cautioned against drowsy driving. If She feels sleepy while driving, She must pull over for a break/nap, rather than persist on the road, in the interest of She own safety and that of others on the road.   Plan   -Dental appliance and positional therapy was dicussed in detail. After informed discussion she would like to try positional therapy and will follow up with ENT for primary " snoring   - HST  was reviewed and discussed with the pt    2. Regarding treatment of other past medical problems and general health maintenance,  She is urged to follow up with PCP.

## 2022-02-07 NOTE — PATIENT INSTRUCTIONS
1. https://[email or web address].TriOviz.AisleBuyer/c-e/hs/sleep-apnea-therapy/i-was-just-diagnosed-with-sleep-apnea/cpap-alternative.html  2. https://[employer]/products/anti-snoring-positional-sleep-aid?utm_source=google&utm_medium=organic&utm_campaign=uosagtnf-qcnvtm-aomvax&mkwid=&ekmm=420743864877&mp_kw=&mp_mt=&pdv=c&gclid=[id number]    3.https://nightshifttherapy.com/ns-home/

## 2022-03-24 LAB
ALBUMIN SERPL-MCNC: 4.5 G/DL (ref 3.8–4.8)
ALBUMIN/GLOB SERPL: 2 {RATIO} (ref 1.2–2.2)
ALP SERPL-CCNC: 58 IU/L (ref 44–121)
ALT SERPL-CCNC: 17 IU/L (ref 0–32)
AST SERPL-CCNC: 17 IU/L (ref 0–40)
BASOPHILS # BLD AUTO: 0.1 X10E3/UL (ref 0–0.2)
BASOPHILS NFR BLD AUTO: 1 %
BILIRUB SERPL-MCNC: 0.7 MG/DL (ref 0–1.2)
BUN SERPL-MCNC: 10 MG/DL (ref 6–20)
BUN/CREAT SERPL: 10 (ref 9–23)
CALCIUM SERPL-MCNC: 9.4 MG/DL (ref 8.7–10.2)
CHLORIDE SERPL-SCNC: 102 MMOL/L (ref 96–106)
CHOLEST SERPL-MCNC: 226 MG/DL (ref 100–199)
CO2 SERPL-SCNC: 22 MMOL/L (ref 20–29)
CREAT SERPL-MCNC: 0.96 MG/DL (ref 0.57–1)
EGFRCR SERPLBLD CKD-EPI 2021: 79 ML/MIN/1.73
EOSINOPHIL # BLD AUTO: 0.1 X10E3/UL (ref 0–0.4)
EOSINOPHIL NFR BLD AUTO: 2 %
ERYTHROCYTE [DISTWIDTH] IN BLOOD BY AUTOMATED COUNT: 11.8 % (ref 11.7–15.4)
GLOBULIN SER CALC-MCNC: 2.3 G/DL (ref 1.5–4.5)
GLUCOSE SERPL-MCNC: 86 MG/DL (ref 65–99)
HCT VFR BLD AUTO: 46.2 % (ref 34–46.6)
HDLC SERPL-MCNC: 54 MG/DL
HGB BLD-MCNC: 15.3 G/DL (ref 11.1–15.9)
IMM GRANULOCYTES # BLD AUTO: 0 X10E3/UL (ref 0–0.1)
IMM GRANULOCYTES NFR BLD AUTO: 0 %
IMMATURE CELLS  115398: NORMAL
LABORATORY COMMENT REPORT: ABNORMAL
LDLC SERPL CALC-MCNC: 151 MG/DL (ref 0–99)
LYMPHOCYTES # BLD AUTO: 1.7 X10E3/UL (ref 0.7–3.1)
LYMPHOCYTES NFR BLD AUTO: 30 %
MCH RBC QN AUTO: 30.5 PG (ref 26.6–33)
MCHC RBC AUTO-ENTMCNC: 33.1 G/DL (ref 31.5–35.7)
MCV RBC AUTO: 92 FL (ref 79–97)
MONOCYTES # BLD AUTO: 0.3 X10E3/UL (ref 0.1–0.9)
MONOCYTES NFR BLD AUTO: 6 %
MORPHOLOGY BLD-IMP: NORMAL
NEUTROPHILS # BLD AUTO: 3.6 X10E3/UL (ref 1.4–7)
NEUTROPHILS NFR BLD AUTO: 61 %
NRBC BLD AUTO-RTO: NORMAL %
PLATELET # BLD AUTO: 216 X10E3/UL (ref 150–450)
POTASSIUM SERPL-SCNC: 4.1 MMOL/L (ref 3.5–5.2)
PROT SERPL-MCNC: 6.8 G/DL (ref 6–8.5)
RBC # BLD AUTO: 5.02 X10E6/UL (ref 3.77–5.28)
SODIUM SERPL-SCNC: 138 MMOL/L (ref 134–144)
T4 FREE SERPL-MCNC: 1.63 NG/DL (ref 0.82–1.77)
TRIGL SERPL-MCNC: 117 MG/DL (ref 0–149)
TSH SERPL DL<=0.005 MIU/L-ACNC: 2.2 UIU/ML (ref 0.45–4.5)
VLDLC SERPL CALC-MCNC: 21 MG/DL (ref 5–40)
WBC # BLD AUTO: 5.7 X10E3/UL (ref 3.4–10.8)

## 2022-05-20 ENCOUNTER — NON-PROVIDER VISIT (OUTPATIENT)
Dept: MEDICAL GROUP | Facility: LAB | Age: 37
End: 2022-05-20
Payer: COMMERCIAL

## 2022-05-20 DIAGNOSIS — Z23 NEED FOR VACCINATION: ICD-10-CM

## 2022-05-20 NOTE — PROGRESS NOTES
"Amelia Love is a 36 y.o. female here for a non-provider visit for:   TDAP    Reason for immunization: Overdue/Provider Recommended  Immunization records indicate need for vaccine: Yes, confirmed with Epic  Minimum interval has been met for this vaccine: Yes  ABN completed: Not Indicated    VIS Dated  8/9/2021 was given to patient: Yes  All IAC Questionnaire questions were answered \"No.\"    Patient tolerated injection and no adverse effects were observed or reported: Yes    Pt scheduled for next dose in series: Not Indicated  "

## 2022-05-23 PROCEDURE — 90471 IMMUNIZATION ADMIN: CPT | Performed by: NURSE PRACTITIONER

## 2022-05-23 PROCEDURE — 90715 TDAP VACCINE 7 YRS/> IM: CPT | Performed by: NURSE PRACTITIONER

## 2022-09-11 SDOH — HEALTH STABILITY: PHYSICAL HEALTH: ON AVERAGE, HOW MANY DAYS PER WEEK DO YOU ENGAGE IN MODERATE TO STRENUOUS EXERCISE (LIKE A BRISK WALK)?: 3 DAYS

## 2022-09-11 SDOH — ECONOMIC STABILITY: INCOME INSECURITY: IN THE LAST 12 MONTHS, WAS THERE A TIME WHEN YOU WERE NOT ABLE TO PAY THE MORTGAGE OR RENT ON TIME?: NO

## 2022-09-11 SDOH — ECONOMIC STABILITY: TRANSPORTATION INSECURITY
IN THE PAST 12 MONTHS, HAS LACK OF TRANSPORTATION KEPT YOU FROM MEETINGS, WORK, OR FROM GETTING THINGS NEEDED FOR DAILY LIVING?: NO

## 2022-09-11 SDOH — ECONOMIC STABILITY: FOOD INSECURITY: WITHIN THE PAST 12 MONTHS, YOU WORRIED THAT YOUR FOOD WOULD RUN OUT BEFORE YOU GOT MONEY TO BUY MORE.: NEVER TRUE

## 2022-09-11 SDOH — ECONOMIC STABILITY: HOUSING INSECURITY: IN THE LAST 12 MONTHS, HOW MANY PLACES HAVE YOU LIVED?: 1

## 2022-09-11 SDOH — ECONOMIC STABILITY: INCOME INSECURITY: HOW HARD IS IT FOR YOU TO PAY FOR THE VERY BASICS LIKE FOOD, HOUSING, MEDICAL CARE, AND HEATING?: NOT HARD AT ALL

## 2022-09-11 SDOH — ECONOMIC STABILITY: FOOD INSECURITY: WITHIN THE PAST 12 MONTHS, THE FOOD YOU BOUGHT JUST DIDN'T LAST AND YOU DIDN'T HAVE MONEY TO GET MORE.: NEVER TRUE

## 2022-09-11 SDOH — HEALTH STABILITY: PHYSICAL HEALTH: ON AVERAGE, HOW MANY MINUTES DO YOU ENGAGE IN EXERCISE AT THIS LEVEL?: 60 MIN

## 2022-09-11 SDOH — HEALTH STABILITY: MENTAL HEALTH
STRESS IS WHEN SOMEONE FEELS TENSE, NERVOUS, ANXIOUS, OR CAN'T SLEEP AT NIGHT BECAUSE THEIR MIND IS TROUBLED. HOW STRESSED ARE YOU?: ONLY A LITTLE

## 2022-09-11 ASSESSMENT — SOCIAL DETERMINANTS OF HEALTH (SDOH)
HOW OFTEN DO YOU ATTEND CHURCH OR RELIGIOUS SERVICES?: PATIENT DECLINED
HOW HARD IS IT FOR YOU TO PAY FOR THE VERY BASICS LIKE FOOD, HOUSING, MEDICAL CARE, AND HEATING?: NOT HARD AT ALL
HOW MANY DRINKS CONTAINING ALCOHOL DO YOU HAVE ON A TYPICAL DAY WHEN YOU ARE DRINKING: PATIENT DECLINED
IN A TYPICAL WEEK, HOW MANY TIMES DO YOU TALK ON THE PHONE WITH FAMILY, FRIENDS, OR NEIGHBORS?: ONCE A WEEK
IN A TYPICAL WEEK, HOW MANY TIMES DO YOU TALK ON THE PHONE WITH FAMILY, FRIENDS, OR NEIGHBORS?: ONCE A WEEK
HOW OFTEN DO YOU GET TOGETHER WITH FRIENDS OR RELATIVES?: ONCE A WEEK
DO YOU BELONG TO ANY CLUBS OR ORGANIZATIONS SUCH AS CHURCH GROUPS UNIONS, FRATERNAL OR ATHLETIC GROUPS, OR SCHOOL GROUPS?: NO
HOW OFTEN DO YOU ATTENT MEETINGS OF THE CLUB OR ORGANIZATION YOU BELONG TO?: PATIENT DECLINED
HOW OFTEN DO YOU ATTENT MEETINGS OF THE CLUB OR ORGANIZATION YOU BELONG TO?: PATIENT DECLINED
WITHIN THE PAST 12 MONTHS, YOU WORRIED THAT YOUR FOOD WOULD RUN OUT BEFORE YOU GOT THE MONEY TO BUY MORE: NEVER TRUE
HOW OFTEN DO YOU ATTEND CHURCH OR RELIGIOUS SERVICES?: PATIENT DECLINED
HOW OFTEN DO YOU HAVE A DRINK CONTAINING ALCOHOL: NEVER
HOW OFTEN DO YOU GET TOGETHER WITH FRIENDS OR RELATIVES?: ONCE A WEEK
HOW OFTEN DO YOU HAVE SIX OR MORE DRINKS ON ONE OCCASION: PATIENT DECLINED
DO YOU BELONG TO ANY CLUBS OR ORGANIZATIONS SUCH AS CHURCH GROUPS UNIONS, FRATERNAL OR ATHLETIC GROUPS, OR SCHOOL GROUPS?: NO

## 2022-09-11 ASSESSMENT — LIFESTYLE VARIABLES
SKIP TO QUESTIONS 9-10: 0
HOW OFTEN DO YOU HAVE SIX OR MORE DRINKS ON ONE OCCASION: PATIENT DECLINED
HOW MANY STANDARD DRINKS CONTAINING ALCOHOL DO YOU HAVE ON A TYPICAL DAY: PATIENT DECLINED
AUDIT-C TOTAL SCORE: -1
HOW OFTEN DO YOU HAVE A DRINK CONTAINING ALCOHOL: NEVER

## 2022-09-12 ENCOUNTER — OFFICE VISIT (OUTPATIENT)
Dept: NEUROLOGY | Facility: MEDICAL CENTER | Age: 37
End: 2022-09-12
Attending: STUDENT IN AN ORGANIZED HEALTH CARE EDUCATION/TRAINING PROGRAM
Payer: COMMERCIAL

## 2022-09-12 VITALS
OXYGEN SATURATION: 97 % | HEIGHT: 71 IN | WEIGHT: 98.11 LBS | HEART RATE: 90 BPM | BODY MASS INDEX: 13.73 KG/M2 | RESPIRATION RATE: 14 BRPM | TEMPERATURE: 97.8 F | SYSTOLIC BLOOD PRESSURE: 114 MMHG | DIASTOLIC BLOOD PRESSURE: 64 MMHG

## 2022-09-12 DIAGNOSIS — G90.522 COMPLEX REGIONAL PAIN SYNDROME I OF LEFT LOWER LIMB: ICD-10-CM

## 2022-09-12 DIAGNOSIS — G62.9 NEUROPATHY: ICD-10-CM

## 2022-09-12 PROCEDURE — 99211 OFF/OP EST MAY X REQ PHY/QHP: CPT | Performed by: STUDENT IN AN ORGANIZED HEALTH CARE EDUCATION/TRAINING PROGRAM

## 2022-09-12 PROCEDURE — 99213 OFFICE O/P EST LOW 20 MIN: CPT | Performed by: STUDENT IN AN ORGANIZED HEALTH CARE EDUCATION/TRAINING PROGRAM

## 2022-09-12 RX ORDER — GABAPENTIN 300 MG/1
300 CAPSULE ORAL 3 TIMES DAILY
Qty: 270 CAPSULE | Refills: 3 | Status: SHIPPED | OUTPATIENT
Start: 2022-09-12 | End: 2023-08-23 | Stop reason: SDUPTHER

## 2022-09-12 RX ORDER — GABAPENTIN 100 MG/1
100 CAPSULE ORAL 3 TIMES DAILY
Qty: 270 CAPSULE | Refills: 3 | Status: SHIPPED | OUTPATIENT
Start: 2022-09-12 | End: 2023-09-07

## 2022-09-12 ASSESSMENT — FIBROSIS 4 INDEX: FIB4 SCORE: 0.71

## 2022-09-12 NOTE — PROGRESS NOTES
"NEUROLOGY FOLLOW-UP - 09/12/2022   REFERRING PROVIDER  Abraham Mcgrath M.D.  [address]  [place] [address]    PCP  Laura Esquivel   [phone]   DUPLICATE [[id number]]     REASON FOR VISIT: Amelia Love 37 y.o. female presents today for follow-up for complex regional pain syndrome of the left foot      INTERVAL HISTORY:  Patient returns for routine follow-up for complex regional pain syndrome of the left foot.  She is doing very well on gabapentin 300 x 3 times per day and will use an additional 100 mg as needed.  She reports 300 mg 3 times daily has effectively controlled her symptoms and the additional 100 mg as needed helps for her further breakthrough discomfort.  She did describe some unmasking of her symptoms on a long plane flight but otherwise is doing very well.    Patient's PMH, PSH, FH, and SH were reviewed.    ROS: All review of systems complete and are negative except as documented    CURRENT MEDICATIONS AT THE TIME OF THIS ENCOUNTER:    Current Outpatient Medications:     gabapentin, 300 mg, Oral, TID    gabapentin, 100 mg, Oral, TID    VITAMIN D PO, Take  by mouth., Taking    acetaminophen, 650 mg, Oral, Q6HRS PRN (Patient not taking: No sig reported), Not Taking    levonorgestrel-ethinyl estradiol, TAKE 1 TABLET DAILY     EXAM:   Encounter Vitals  /64 (BP Location: Left arm, Patient Position: Sitting, BP Cuff Size: Adult)   Pulse 90   Temp 36.6 °C (97.8 °F) (Temporal)   Resp 14   Ht 1.803 m (5' 11\")   Wt 44.5 kg (98 lb 1.7 oz)   SpO2 97%            Physical Exam:  Physical Exam  Constitutional:       General: She is not in acute distress.     Appearance: She is not ill-appearing.   HENT:      Head: Normocephalic and atraumatic.      Nose: Nose normal. No congestion or rhinorrhea.      Mouth/Throat:      Mouth: Mucous membranes are moist.      Pharynx: No oropharyngeal exudate or posterior oropharyngeal erythema.   Cardiovascular:      Rate and Rhythm: Normal rate and " regular rhythm.      Pulses: Normal pulses.      Heart sounds: Normal heart sounds.   Pulmonary:      Effort: Pulmonary effort is normal.      Breath sounds: Normal breath sounds. No wheezing.   Musculoskeletal:         General: No swelling or tenderness. Normal range of motion.      Cervical back: Normal range of motion and neck supple. No rigidity.   Skin:     General: Skin is warm and dry.      Findings: Erythema (Very mild erythematous swelling of the left lower extremity) present.   Neurological:      Mental Status: She is alert.      Motor: Motor strength is normal.      Neurological Exam   Neurological Exam  Mental Status  Alert.    Motor  Normal muscle bulk throughout. No fasciculations present. Strength is 5/5 throughout all four extremities.    Sensory  Sensation of the lower extremity intact with out allodynia or dysesthesias.     ALL DATA (I.e. labs, procedures, imaging, reports, clinical notes, etc.) FROM [facility] AND/OR OUTSIDE SOURCES, IF AVAILABLE, PERSONALLY REVIEWED:       ASSESSMENT, EDUCATION, AND COUNSELING:  This is a 37 y.o. female patient who presents to the neurology clinic. We had an extensive discussion about the patient's symptoms, signs, and work-up to date, if any. We discussed potential and/or definitive diagnoses, work-up, and potential treatments.       PLAN:  If applicable, the work-up such as labs, imaging, procedures, and/or other testing, referrals, and/or recommended treatment strategies are listed below.    Visit Diagnoses     ICD-10-CM   1. Complex regional pain syndrome i of left lower limb  G90.522   2. Neuropathy  G62.9        Orders Placed This Encounter    gabapentin (NEURONTIN) 300 MG Cap    gabapentin (NEURONTIN) 100 MG Cap        Patient with complex regional pain syndrome of the left foot well-controlled on gabapentin 300 3 times daily with 100 mg gabapentin tablets to be used as needed.  We will continue the regimen unchanged.  Had an extensive discussion about  triggers of her pain syndrome which can be due to mild trauma to the foot, changes in ambient temperature, extremes of temperature particularly in the heat, chronic disruption of sleep, prolonged sitting, a coexisting inflammatory/infectious issue which may cause unmasking of her pain syndrome of her left foot.  Gabapentin refilled.      BILLING DOCUMENTATION:     I spent a total of I spent a total of 20 minutes on the day of the visit.    minutes of face-to-face time in this visit. Over 50% of the time of the visit today was spent on counseling and/or coordination of care wtih the patient and/or family, as above in assessment in plan.    Abraham Mcgrath MD  Epilepsy and General Neurology  Department of Neurology  Clinical  of Neurology [facility] [employer].

## 2022-09-13 NOTE — PATIENT INSTRUCTIONS
Neurology Clinic Visit     IMPORTANT: If imaging, procedure(s), AND/or referrals were placed for you:  Contact [facility] Scheduling if you have not heard from them in 5 day: [phone]    Outpatient [facility] Imaging Sites.  [address]  Mon-Fri 8am-5pm   [address] (Breast Center) Mon-Fri 7am-4pm    [address]  Mon-Fri 8am-5pm  [facility] 7am-6:30pm  [address] [facility] Mon-Fri 8am-7pm  Sat 9am-6pm   [address]  Mon-Fri 8am-7pm and Sat/Sun 9am-5pm  [address]  Mon-Fri 8am-5pm  [address] (PET/CT)  Mon-Fri 8:30am-4:30pm     If labs were ordered for you:  To Schedule an appointment for lab services, please call [phone] or visit [email or web address].  [facility] Lab Services Convenient Locations:    [place]: [address] (Ground Floor)  [address] [facility] (Admitting Entrance)  [address], [address]  [address], [address]  [address] [facility], [address]  [address] [facility]  [address] [place]: [address]  [address]       [place]: [address] [place] [address] Dr  [address]     [facility] Advanced [facility]     [address] [place] [address]     Laboratory Hours: 6:30am - 6pm  Monday - Friday,   7am - 2pm Saturday    [facility]      [address] [facility] [place] [address]      Laboratory Hours:  7:30am - 4pm  Monday - Friday,  9am - 3pm Saturday    [facility]     [address]    [place] [address]      Laboratory Hours:  7:00am - 5:30pm  Monday - Friday    [facility]      [address] [place] [address]       Laboratory Hours:  7:30am - 4:30pm  Monday - Friday    [facility]       [address] [place] [address]       Laboratory Hours:  8am - 5pm  Monday - Friday    [facility]       [address] [place] [address]       Laboratory hours:  7:30am - 4pm   Monday - Friday    GENERAL REMINDERS:  Request refills 1 week in advance to ensure you do not run out of medications  All diagnostic study results will be reviewed at your next visit, UNLESS there are urgent results that need to be acted on sooner.  Please remember that it is the your responsibility to check with your Insurance for benefit coverage for visit / visits.  24 hours notice is required for all appointment changes or cancellation.  Please arrive 20 min. before your appointment time  Please note that because Dr. Mcgrath has high daily clinic volume, he is unable to accommodate late arrivals. If you are more than 15 minutes late for the scheduled appointment you will be asked to reschedule  Please bring the following with you:  1) Picture ID  2) Insurance card  3) An updated list of ALL your medications and their dosages (prescribed medications, over the counter medications, and all supplements), as well as allergies with you at all times  4) A list of questions, concerns, comments that you wish to discuss with Dr. Alcides Mcgrath MD  General Neurologist and Epileptologist  Department of Neurology  Instructor of Clinical Neurology [employer] [employer].

## 2022-09-14 ENCOUNTER — OFFICE VISIT (OUTPATIENT)
Dept: MEDICAL GROUP | Facility: LAB | Age: 37
End: 2022-09-14
Payer: COMMERCIAL

## 2022-09-14 VITALS
OXYGEN SATURATION: 98 % | HEART RATE: 90 BPM | WEIGHT: 152 LBS | HEIGHT: 71 IN | DIASTOLIC BLOOD PRESSURE: 72 MMHG | SYSTOLIC BLOOD PRESSURE: 116 MMHG | TEMPERATURE: 97.9 F | BODY MASS INDEX: 21.28 KG/M2 | RESPIRATION RATE: 14 BRPM

## 2022-09-14 DIAGNOSIS — Z23 NEED FOR VACCINATION: ICD-10-CM

## 2022-09-14 DIAGNOSIS — N93.9 ABNORMAL UTERINE BLEEDING: ICD-10-CM

## 2022-09-14 DIAGNOSIS — E03.8 SUBCLINICAL HYPOTHYROIDISM: ICD-10-CM

## 2022-09-14 DIAGNOSIS — Z30.41 ENCOUNTER FOR BIRTH CONTROL PILLS MAINTENANCE: ICD-10-CM

## 2022-09-14 DIAGNOSIS — E78.5 DYSLIPIDEMIA: ICD-10-CM

## 2022-09-14 PROBLEM — G90.522 COMPLEX REGIONAL PAIN SYNDROME TYPE 1 OF LEFT LOWER EXTREMITY: Status: ACTIVE | Noted: 2022-09-14

## 2022-09-14 PROCEDURE — 90686 IIV4 VACC NO PRSV 0.5 ML IM: CPT | Performed by: NURSE PRACTITIONER

## 2022-09-14 PROCEDURE — 99214 OFFICE O/P EST MOD 30 MIN: CPT | Mod: 25 | Performed by: NURSE PRACTITIONER

## 2022-09-14 PROCEDURE — 90471 IMMUNIZATION ADMIN: CPT | Performed by: NURSE PRACTITIONER

## 2022-09-14 RX ORDER — LEVONORGESTREL AND ETHINYL ESTRADIOL 0.15-0.03
1 KIT ORAL DAILY
Qty: 91 TABLET | Refills: 3 | Status: SHIPPED | OUTPATIENT
Start: 2022-09-14 | End: 2022-11-14 | Stop reason: SDUPTHER

## 2022-09-14 ASSESSMENT — FIBROSIS 4 INDEX: FIB4 SCORE: 0.71

## 2022-09-14 NOTE — ASSESSMENT & PLAN NOTE
Patient currently on OCP, symptoms are controlled. She has a period about every 3 months with heavy bleeding and cramping.

## 2022-10-05 ENCOUNTER — TELEMEDICINE (OUTPATIENT)
Dept: MEDICAL GROUP | Facility: LAB | Age: 37
End: 2022-10-05
Payer: COMMERCIAL

## 2022-10-05 VITALS — HEART RATE: 72 BPM | OXYGEN SATURATION: 95 % | TEMPERATURE: 98.6 F

## 2022-10-05 DIAGNOSIS — J40 BRONCHITIS: ICD-10-CM

## 2022-10-05 PROCEDURE — 99213 OFFICE O/P EST LOW 20 MIN: CPT | Mod: 95 | Performed by: NURSE PRACTITIONER

## 2022-10-05 RX ORDER — AMOXICILLIN AND CLAVULANATE POTASSIUM 875; 125 MG/1; MG/1
1 TABLET, FILM COATED ORAL 2 TIMES DAILY
Qty: 14 TABLET | Refills: 0 | Status: SHIPPED | OUTPATIENT
Start: 2022-10-05 | End: 2023-10-12

## 2022-10-05 RX ORDER — BENZONATATE 100 MG/1
100 CAPSULE ORAL 3 TIMES DAILY PRN
Qty: 60 CAPSULE | Refills: 0 | Status: SHIPPED | OUTPATIENT
Start: 2022-10-05 | End: 2023-10-12

## 2022-10-06 NOTE — PROGRESS NOTES
Virtual Visit: Established Patient   This visit was conducted via Zoom using secure and encrypted videoconferencing technology.   The patient was in their home in the [facility].    The patient's identity was confirmed and verbal consent was obtained for this virtual visit.     Subjective:   CC:   Chief Complaint   Patient presents with    Follow-Up     Cough still lingering      Amelia Love is a 37 y.o. female presenting for evaluation and management of:    Cough  Ongoing for several weeks. Patient tested positive for covid 9/20/2022. Since then she had had a dry cough and mild congestion.   Denies fever, shortness of breath.     ROS   As documented in history of present illness above     Objective:   Pulse 72 Comment: VIRTUAL  Temp 37 °C (98.6 °F) Comment: VIRTUAL  SpO2 95% Comment: VIRTUAL    Physical Exam:  Constitutional: Alert, no distress, well-groomed.  Skin: No rashes in visible areas.  Eye: Round. Conjunctiva clear, lids normal. No icterus.   ENMT: Lips pink without lesions, good dentition, moist mucous membranes. Phonation normal.  Neck: No masses, no thyromegaly. Moves freely without pain.  Respiratory: Unlabored respiratory effort, no cough or audible wheeze  Psych: Alert and oriented x3, normal affect and mood.     Assessment and Plan:   The following treatment plan was discussed:     1. Bronchitis  Discussed using a cool mist humidifier at home. Recommend using nasal saline wash (i.e. Nedi-Pot), over-the-counter decongestants as needed and antibiotics as prescribed. Tylenol or Motrin as needed for headache or discomfort. Supportive care, differential diagnoses, and indications for immediate follow-up discussed with patient. Pathogenesis of diagnosis discussed including typical length and natural progression. Instructed to return to clinic for any change in condition, further concerns, or worsening of symptoms.  - amoxicillin-clavulanate (AUGMENTIN) 875-125 MG Tab; Take 1 Tablet by  mouth 2 times a day.  Dispense: 14 Tablet; Refill: 0  - benzonatate (TESSALON) 100 MG Cap; Take 1 Capsule by mouth 3 times a day as needed for Cough.  Dispense: 60 Capsule; Refill: 0

## 2022-10-10 NOTE — PROGRESS NOTES
"Subjective:     CC:   Chief Complaint   Patient presents with    Annual Exam     HPI:   Amelia presents today with the following:    Abnormal uterine bleeding  Patient currently on OCP, symptoms are controlled. She has a period about every 3 months with heavy bleeding and cramping.     ROS:   Gen: no fevers/chills, no changes in weight  Eyes: no changes in vision  ENT: no sore throat, no hearing loss, no bloody nose  Pulm: no sob, no cough  CV: no chest pain, no palpitations  GI: no nausea/vomiting, no diarrhea  : no dysuria  MSk: no myalgias  Skin: no rash  Neuro: no headaches, no numbness/tingling  Heme/Lymph: no easy bruising        - NOTE: All other systems reviewed and are negative, except as in HPI.    Objective:     Exam: /72 (BP Location: Right arm, Patient Position: Sitting, BP Cuff Size: Adult)   Pulse 90   Temp 36.6 °C (97.9 °F)   Resp 14   Ht 1.803 m (5' 11\")   Wt 68.9 kg (152 lb)   SpO2 98%  Body mass index is 21.2 kg/m².    General: Normal appearing. No distress.  HEENT: Normocephalic. Eyes conjunctiva clear lids without ptosis, pupils equal and reactive to light accommodation, ears normal shape and contour, canals are clear bilaterally, tympanic membranes are benign, nasal mucosa benign, oropharynx is without erythema, edema or exudates.   Neck: Supple without JVD or bruit. Thyroid is not enlarged.  Pulmonary: Clear to ausculation.  Normal effort. No rales, ronchi, or wheezing.  Cardiovascular: Regular rate and rhythm without murmur. Carotid and radial pulses are intact and equal bilaterally.  Abdomen: Soft, nontender, nondistended. Normal bowel sounds. Liver and spleen are not palpable  Neurologic: Grossly nonfocal  Lymph: No cervical or supraclavicular lymph nodes are palpable  Skin: Warm and dry.  No obvious lesions.  Musculoskeletal: Normal gait. No extremity cyanosis, clubbing, or edema.  Psych: Normal mood and affect. Alert and oriented x3. Judgment and insight is " normal.    Assessment & Plan:     37 y.o. female with the following -     1. Subclinical hypothyroidism  Labs ordered.   - CBC WITHOUT DIFFERENTIAL; Future  - Comp Metabolic Panel; Future  - Lipid Profile; Future  - TSH WITH REFLEX TO FT4; Future  - VITAMIN D,25 HYDROXY (DEFICIENCY); Future    2. Dyslipidemia  Labs as indicated.  Continue lifestyle modifications.  - CBC WITHOUT DIFFERENTIAL; Future  - Comp Metabolic Panel; Future  - Lipid Profile; Future  - TSH WITH REFLEX TO FT4; Future  - VITAMIN D,25 HYDROXY (DEFICIENCY); Future    3. Abnormal uterine bleeding  4. Encounter for birth control pills maintenance  Patient to continue medication as prescribed. Patient does not have contraindications to hormonal birth control.  Emphasized that condoms are the only way to prevent STD's.  Advised not to smoke while on hormonal birth control.  - levonorgestrel-ethinyl estradiol (SEASONALE) 0.15-0.03 MG per tablet; Take 1 Tablet by mouth every day.  Dispense: 91 Tablet; Refill: 3    5. Need for vaccination  Patient was agreeable to receiving the flu vaccine in clinic today after discussion of potential risks, benefits, and side effects. Vaccine administered without adverse effects.  - Influenza Vaccine Quad Injection (PF)

## 2022-10-14 ENCOUNTER — OFFICE VISIT (OUTPATIENT)
Dept: MEDICAL GROUP | Facility: LAB | Age: 37
End: 2022-10-14
Payer: COMMERCIAL

## 2022-10-14 VITALS
HEART RATE: 83 BPM | WEIGHT: 158.2 LBS | HEIGHT: 71 IN | OXYGEN SATURATION: 93 % | BODY MASS INDEX: 22.15 KG/M2 | DIASTOLIC BLOOD PRESSURE: 92 MMHG | SYSTOLIC BLOOD PRESSURE: 130 MMHG | TEMPERATURE: 98.4 F

## 2022-10-14 DIAGNOSIS — J40 BRONCHITIS: ICD-10-CM

## 2022-10-14 PROCEDURE — 99214 OFFICE O/P EST MOD 30 MIN: CPT | Performed by: NURSE PRACTITIONER

## 2022-10-14 RX ORDER — ALBUTEROL SULFATE 90 UG/1
2 AEROSOL, METERED RESPIRATORY (INHALATION) EVERY 4 HOURS PRN
Qty: 1 EACH | Refills: 0 | Status: SHIPPED | OUTPATIENT
Start: 2022-10-14 | End: 2023-11-15

## 2022-10-14 RX ORDER — PREDNISONE 20 MG/1
40 TABLET ORAL DAILY
Qty: 14 TABLET | Refills: 0 | Status: SHIPPED | OUTPATIENT
Start: 2022-10-14 | End: 2022-11-14

## 2022-10-14 ASSESSMENT — FIBROSIS 4 INDEX: FIB4 SCORE: 0.71

## 2022-10-14 NOTE — PROGRESS NOTES
"Subjective:     CC:   Chief Complaint   Patient presents with    Cough    Follow-Up       HPI:   Amelia presents today with the following:    Cough  Ongoing for several weeks. She tested positive for covid on 9/20/2022. She has almost completed a course of Augmentin with no relief of symptoms. Reports that her cough is now productive. Denies congestion, fever, shortness of breath, sore throat, ear pain.   No history of asthma.     ROS:   As documented in history of present illness above    Objective:     Exam: BP (!) 130/92 (BP Location: Left arm, Patient Position: Sitting, BP Cuff Size: Adult)   Pulse 83   Temp 36.9 °C (98.4 °F) (Temporal)   Ht 1.803 m (5' 11\")   Wt 71.8 kg (158 lb 3.2 oz)   SpO2 93%  Body mass index is 22.06 kg/m².    General: Normal appearing. No distress.  HEENT: Normocephalic. Eyes conjunctiva clear lids without ptosis, pupils equal and reactive to light accommodation, ears normal shape and contour, canals are clear bilaterally, tympanic membranes are benign, nasal mucosa benign, oropharynx is without erythema, edema or exudates.   Neck: Supple without JVD or bruit. Thyroid is not enlarged.  Pulmonary: Clear to ausculation.  Normal effort. No rales, ronchi, or wheezing.  Cardiovascular: Regular rate and rhythm without murmur. Carotid and radial pulses are intact and equal bilaterally.  Neurologic: Grossly nonfocal  Lymph: No cervical or supraclavicular lymph nodes are palpable  Skin: Warm and dry.  No obvious lesions.  Musculoskeletal: Normal gait. No extremity cyanosis, clubbing, or edema.  Psych: Normal mood and affect. Alert and oriented x3. Judgment and insight is normal.    Assessment & Plan:     37 y.o. female with the following -     1. Bronchitis  Patient to take medication as prescribed. Advised nasal saline and steroid sprays daily.  OTC anti-histamines (Zyrtec) as needed. If symptoms continue, recommend chest x-ray, which has been ordered.  - predniSONE (DELTASONE) 20 MG Tab; " Take 2 Tablets by mouth every day.  Dispense: 14 Tablet; Refill: 0  - albuterol 108 (90 Base) MCG/ACT Aero Soln inhalation aerosol; Inhale 2 Puffs every four hours as needed for Shortness of Breath.  Dispense: 1 Each; Refill: 0  - DX-CHEST-2 VIEWS; Future

## 2022-10-19 ENCOUNTER — APPOINTMENT (OUTPATIENT)
Dept: RADIOLOGY | Facility: MEDICAL CENTER | Age: 37
End: 2022-10-19
Attending: NURSE PRACTITIONER
Payer: COMMERCIAL

## 2022-10-19 DIAGNOSIS — J40 BRONCHITIS: ICD-10-CM

## 2022-10-19 PROCEDURE — 71046 X-RAY EXAM CHEST 2 VIEWS: CPT

## 2022-11-14 ENCOUNTER — OFFICE VISIT (OUTPATIENT)
Dept: MEDICAL GROUP | Facility: LAB | Age: 37
End: 2022-11-14
Payer: OTHER GOVERNMENT

## 2022-11-14 VITALS
HEIGHT: 71 IN | DIASTOLIC BLOOD PRESSURE: 66 MMHG | WEIGHT: 153.9 LBS | TEMPERATURE: 99.4 F | SYSTOLIC BLOOD PRESSURE: 104 MMHG | BODY MASS INDEX: 21.55 KG/M2 | OXYGEN SATURATION: 96 % | RESPIRATION RATE: 14 BRPM | HEART RATE: 90 BPM

## 2022-11-14 DIAGNOSIS — Z30.41 ENCOUNTER FOR BIRTH CONTROL PILLS MAINTENANCE: ICD-10-CM

## 2022-11-14 DIAGNOSIS — R05.2 SUBACUTE COUGH: ICD-10-CM

## 2022-11-14 PROCEDURE — 99214 OFFICE O/P EST MOD 30 MIN: CPT | Performed by: NURSE PRACTITIONER

## 2022-11-14 RX ORDER — PREDNISONE 20 MG/1
TABLET ORAL
Qty: 18 TABLET | Refills: 0 | Status: SHIPPED | OUTPATIENT
Start: 2022-11-14 | End: 2022-11-23

## 2022-11-14 RX ORDER — CODEINE PHOSPHATE AND GUAIFENESIN 10; 100 MG/5ML; MG/5ML
5 SOLUTION ORAL EVERY 6 HOURS PRN
Qty: 140 ML | Refills: 0 | Status: SHIPPED | OUTPATIENT
Start: 2022-11-14 | End: 2022-11-21

## 2022-11-14 RX ORDER — LEVONORGESTREL AND ETHINYL ESTRADIOL 0.15-0.03
1 KIT ORAL DAILY
Qty: 91 TABLET | Refills: 3 | Status: SHIPPED | OUTPATIENT
Start: 2022-11-14 | End: 2023-10-12 | Stop reason: SDUPTHER

## 2022-11-14 ASSESSMENT — FIBROSIS 4 INDEX: FIB4 SCORE: 0.71

## 2022-11-14 NOTE — PROGRESS NOTES
"Subjective:     CC:   Chief Complaint   Patient presents with    Rib Pain     R side , under R breast , cough     Medication Refill     Bc     Follow-Up     Covid / covid booster questions      HPI:   Amelia presents today with the following:    Cough  Patient reports continued cough, although reports that it is better.   Reports right rib pain for the last 2 weeks, thinks it is from coughing. The rib pain is making it harder to breathe deep and cough. She feels some relief with sitting forward.  Denies shortness of breath.   She has been using an albuterol inhaler with some relief.   No pain with palpation.     ROS:   As documented in history of present illness above    Objective:     Exam: /66 (BP Location: Left arm, Patient Position: Sitting, BP Cuff Size: Adult)   Pulse 90   Temp 37.4 °C (99.4 °F)   Resp 14   Ht 1.803 m (5' 11\")   Wt 69.8 kg (153 lb 14.4 oz)   SpO2 96%  Body mass index is 21.46 kg/m².    Constitutional: Alert, no distress, well-groomed.  Skin: Warm, dry, good turgor, no rashes in visible areas.  Eye: Equal, round and reactive, conjunctiva clear, lids normal.  ENMT: Lips without lesions, good dentition, moist mucous membranes.  Neck: Trachea midline, no masses, no thyromegaly.  Respiratory: Unlabored respiratory effort, no cough. Clear to ausculation. No rales, ronchi, or wheezing.  Cardiovascular: Regular rate and rhythm without murmur. Carotid and radial pulses are intact and equal bilaterally.  MSK: Normal gait, moves all extremities.  Neuro: Grossly non-focal.   Psych: Alert and oriented x3, normal affect and mood.    Assessment & Plan:     37 y.o. female with the following -     1. Subacute cough  Patient to take medication as directed, potential side effects of medication discussed. Sleep with HOB elevated, humidifier at night, rest, increase fluid intake.  Patient can take ibuprofen as directed for pain/discomfort.  Supportive care, differential diagnoses, and indications for " immediate follow-up discussed with patient. Pathogenesis of diagnosis discussed including typical length and natural progression. Instructed to return to clinic for any change in condition, further concerns, or worsening of symptoms. Patient states understanding of the plan of care and discharge instructions.  - predniSONE (DELTASONE) 20 MG Tab; Take 3 Tablets by mouth every day for 3 days, THEN 2 Tablets every day for 3 days, THEN 1 Tablet every day for 3 days.  Dispense: 18 Tablet; Refill: 0  - guaifenesin-codeine (ROBITUSSIN AC) Solution oral solution; Take 5 mL by mouth every 6 hours as needed for Cough for up to 7 days.  Dispense: 140 mL; Refill: 0    2. Encounter for birth control pills maintenance  Patient does not have contraindications to hormonal birth control.  Medication refilled. Emphasized that condoms are the only way to prevent STD's.  Advised not to smoke while on hormonal birth control.  - levonorgestrel-ethinyl estradiol (SEASONALE) 0.15-0.03 MG per tablet; Take 1 Tablet by mouth every day.  Dispense: 91 Tablet; Refill: 3

## 2023-01-13 ENCOUNTER — OFFICE VISIT (OUTPATIENT)
Dept: URGENT CARE | Facility: CLINIC | Age: 38
End: 2023-01-13
Payer: OTHER GOVERNMENT

## 2023-01-13 VITALS
TEMPERATURE: 98 F | RESPIRATION RATE: 16 BRPM | HEIGHT: 71 IN | SYSTOLIC BLOOD PRESSURE: 134 MMHG | BODY MASS INDEX: 21.7 KG/M2 | OXYGEN SATURATION: 97 % | DIASTOLIC BLOOD PRESSURE: 88 MMHG | WEIGHT: 155 LBS | HEART RATE: 87 BPM

## 2023-01-13 DIAGNOSIS — L01.00 IMPETIGO: ICD-10-CM

## 2023-01-13 PROCEDURE — 99213 OFFICE O/P EST LOW 20 MIN: CPT | Performed by: FAMILY MEDICINE

## 2023-01-13 ASSESSMENT — ENCOUNTER SYMPTOMS
MYALGIAS: 0
VOMITING: 0
CHILLS: 0
SHORTNESS OF BREATH: 0
DIZZINESS: 0
FEVER: 0
COUGH: 1
NAUSEA: 0
SORE THROAT: 0

## 2023-01-13 ASSESSMENT — FIBROSIS 4 INDEX: FIB4 SCORE: 0.71

## 2023-01-14 NOTE — PROGRESS NOTES
Subjective:   Amelia Love is a 37 y.o. female who presents for Nasal Swelling (X2 weeks, mainly on the right side )        Facial Injury  Chronicity: Reports right-sided nasal swelling, intermittent crusting discharge over the past 2 weeks. Episode onset: 2 weeks. The problem occurs intermittently. The problem has been unchanged. Associated symptoms include coughing (Reports intermittent cough patient attributes to previous COVID) and a rash (Intranasal). Pertinent negatives include no chills, fever, myalgias, nausea, sore throat or vomiting. She has tried rest for the symptoms. The treatment provided no relief.   PMH:  has a past medical history of Seasonal allergic rhinitis (11/15/2013).  MEDS:   Current Outpatient Medications:     mupirocin (BACTROBAN) 2 % Ointment, Apply 1 Application topically 2 times a day for 7 days., Disp: 22 g, Rfl: 0    levonorgestrel-ethinyl estradiol (SEASONALE) 0.15-0.03 MG per tablet, Take 1 Tablet by mouth every day., Disp: 91 Tablet, Rfl: 3    albuterol 108 (90 Base) MCG/ACT Aero Soln inhalation aerosol, Inhale 2 Puffs every four hours as needed for Shortness of Breath., Disp: 1 Each, Rfl: 0    gabapentin (NEURONTIN) 300 MG Cap, Take 1 Capsule by mouth 3 times a day for 360 days., Disp: 270 Capsule, Rfl: 3    gabapentin (NEURONTIN) 100 MG Cap, Take 1 Capsule by mouth 3 times a day for 360 days., Disp: 270 Capsule, Rfl: 3    VITAMIN D PO, Take  by mouth., Disp: , Rfl:     amoxicillin-clavulanate (AUGMENTIN) 875-125 MG Tab, Take 1 Tablet by mouth 2 times a day. (Patient not taking: Reported on 1/13/2023), Disp: 14 Tablet, Rfl: 0    benzonatate (TESSALON) 100 MG Cap, Take 1 Capsule by mouth 3 times a day as needed for Cough. (Patient not taking: Reported on 1/13/2023), Disp: 60 Capsule, Rfl: 0  ALLERGIES:   Allergies   Allergen Reactions    Minocycline     Tetracycline      SURGHX:   Past Surgical History:   Procedure Laterality Date    DENTAL EXTRACTION(S)       SOCHX:   "reports that she has never smoked. She has never used smokeless tobacco. She reports current alcohol use of about 0.5 oz per week. She reports that she does not use drugs.  FH:   Family History   Problem Relation Age of Onset    Hypertension Father     Hyperlipidemia Mother     Other Mother 45        MS     Thyroid Sister     Cancer Maternal Grandmother 50        breast    Thyroid Maternal Grandmother     Allergies Sister     Thyroid Maternal Aunt     Other Maternal Aunt         Raynaud's     Alcohol/Drug Neg Hx     Diabetes Neg Hx     Heart Disease Neg Hx     Psychiatric Illness Neg Hx     Clotting Disorder Neg Hx      Review of Systems   Constitutional:  Negative for chills and fever.   HENT:  Negative for sore throat.    Respiratory:  Positive for cough (Reports intermittent cough patient attributes to previous COVID). Negative for shortness of breath.    Gastrointestinal:  Negative for nausea and vomiting.   Musculoskeletal:  Negative for myalgias.   Skin:  Positive for rash (Intranasal).   Neurological:  Negative for dizziness.      Objective:   /88   Pulse 87   Temp 36.7 °C (98 °F) (Temporal)   Resp 16   Ht 1.803 m (5' 11\")   Wt 70.3 kg (155 lb)   SpO2 97%   BMI 21.62 kg/m²   Physical Exam  Vitals and nursing note reviewed.   Constitutional:       General: She is not in acute distress.     Appearance: She is well-developed.   HENT:      Head: Normocephalic and atraumatic.      Right Ear: External ear normal.      Left Ear: External ear normal.      Nose: Nose normal.      Right Nostril: No foreign body or septal hematoma.      Left Nostril: No foreign body or septal hematoma.      Right Turbinates: Swollen.      Left Turbinates: Not swollen.      Comments: Internal and external right naris erythematous, superficial bulla with peripheral edema with minimal crusting consistent with impetigo     Mouth/Throat:      Mouth: Mucous membranes are moist.   Eyes:      Conjunctiva/sclera: Conjunctivae " normal.   Cardiovascular:      Rate and Rhythm: Normal rate.   Pulmonary:      Effort: Pulmonary effort is normal. No respiratory distress.      Breath sounds: Normal breath sounds. No wheezing or rhonchi.   Abdominal:      General: There is no distension.   Musculoskeletal:         General: Normal range of motion.   Skin:     General: Skin is warm and dry.   Neurological:      General: No focal deficit present.      Mental Status: She is alert and oriented to person, place, and time. Mental status is at baseline.      Gait: Gait (gait at baseline) normal.   Psychiatric:         Judgment: Judgment normal.         Assessment/Plan:   1. Impetigo  - mupirocin (BACTROBAN) 2 % Ointment; Apply 1 Application topically 2 times a day for 7 days.  Dispense: 22 g; Refill: 0        Medical Decision Making/Course:  In the course of preparing for this visit with review of the pertinent past medical history, recent and past clinic visits, current medications, and performing chart, immunization, medical history and medication reconciliation, and in the further course of obtaining the current history pertinent to the clinic visit today, performing an exam and evaluation, ordering and independently evaluating labs, tests  , and/or procedures, prescribing any recommended new medications as noted above, providing any pertinent counseling and education and recommending further coordination of care including recommendation for symptomatic and supportive measures, at least  12 minutes of total time were spent during this encounter.      Discussed close monitoring, return precautions, and supportive measures of maintaining adequate fluid hydration and caloric intake, relative rest and symptom management as needed for pain and/or fever.    Differential diagnosis, natural history, supportive care, and indications for immediate follow-up discussed.     Advised the patient to follow-up with the primary care physician for recheck, reevaluation,  and consideration of further management.    Please note that this dictation was created using voice recognition software. I have worked with consultants from the vendor as well as technical experts from [facility] to optimize the interface. I have made every reasonable attempt to correct obvious errors, but I expect that there are errors of grammar and possibly content that I did not discover before finalizing the note.

## 2023-01-15 ENCOUNTER — PATIENT MESSAGE (OUTPATIENT)
Dept: MEDICAL GROUP | Facility: LAB | Age: 38
End: 2023-01-15
Payer: COMMERCIAL

## 2023-01-23 ENCOUNTER — APPOINTMENT (RX ONLY)
Dept: URBAN - METROPOLITAN AREA CLINIC 35 | Facility: CLINIC | Age: 38
Setting detail: DERMATOLOGY
End: 2023-01-23

## 2023-01-23 DIAGNOSIS — Z71.89 OTHER SPECIFIED COUNSELING: ICD-10-CM

## 2023-01-23 DIAGNOSIS — L81.4 OTHER MELANIN HYPERPIGMENTATION: ICD-10-CM

## 2023-01-23 DIAGNOSIS — L82.1 OTHER SEBORRHEIC KERATOSIS: ICD-10-CM

## 2023-01-23 DIAGNOSIS — D18.0 HEMANGIOMA: ICD-10-CM

## 2023-01-23 DIAGNOSIS — D22 MELANOCYTIC NEVI: ICD-10-CM

## 2023-01-23 PROBLEM — D23.62 OTHER BENIGN NEOPLASM OF SKIN OF LEFT UPPER LIMB, INCLUDING SHOULDER: Status: ACTIVE | Noted: 2023-01-23

## 2023-01-23 PROBLEM — D22.72 MELANOCYTIC NEVI OF LEFT LOWER LIMB, INCLUDING HIP: Status: ACTIVE | Noted: 2023-01-23

## 2023-01-23 PROBLEM — D18.01 HEMANGIOMA OF SKIN AND SUBCUTANEOUS TISSUE: Status: ACTIVE | Noted: 2023-01-23

## 2023-01-23 PROBLEM — D22.5 MELANOCYTIC NEVI OF TRUNK: Status: ACTIVE | Noted: 2023-01-23

## 2023-01-23 PROCEDURE — ? OBSERVATION AND MEASURE

## 2023-01-23 PROCEDURE — 99213 OFFICE O/P EST LOW 20 MIN: CPT

## 2023-01-23 PROCEDURE — ? COUNSELING

## 2023-01-23 ASSESSMENT — LOCATION ZONE DERM
LOCATION ZONE: TRUNK
LOCATION ZONE: NECK
LOCATION ZONE: LEG
LOCATION ZONE: FACE

## 2023-01-23 ASSESSMENT — LOCATION SIMPLE DESCRIPTION DERM
LOCATION SIMPLE: LEFT ACHILLES SKIN
LOCATION SIMPLE: TRAPEZIAL NECK
LOCATION SIMPLE: RIGHT BREAST
LOCATION SIMPLE: CHEST
LOCATION SIMPLE: ABDOMEN
LOCATION SIMPLE: RIGHT FOREHEAD
LOCATION SIMPLE: LEFT UPPER BACK

## 2023-01-23 ASSESSMENT — LOCATION DETAILED DESCRIPTION DERM
LOCATION DETAILED: MID TRAPEZIAL NECK
LOCATION DETAILED: EPIGASTRIC SKIN
LOCATION DETAILED: LEFT RIB CAGE
LOCATION DETAILED: RIGHT LATERAL BREAST 6-7:00 REGION
LOCATION DETAILED: LEFT MEDIAL UPPER BACK
LOCATION DETAILED: LEFT ACHILLES SKIN
LOCATION DETAILED: LEFT MEDIAL SUPERIOR CHEST
LOCATION DETAILED: RIGHT INFERIOR LATERAL FOREHEAD
LOCATION DETAILED: PERIUMBILICAL SKIN

## 2023-08-11 ENCOUNTER — TELEPHONE (OUTPATIENT)
Dept: NEUROLOGY | Facility: MEDICAL CENTER | Age: 38
End: 2023-08-11
Payer: COMMERCIAL

## 2023-08-11 NOTE — TELEPHONE ENCOUNTER
08/11/23  Called and left the patient a voicemail letting her know that she will need to call and reschedule her appointment with Dr Mcgrath on 09/15/23 as the provider will be out of the office. I left the number to the office so she could call and get it rescheduled. HL

## 2023-08-14 DIAGNOSIS — G62.9 NEUROPATHY: ICD-10-CM

## 2023-08-14 DIAGNOSIS — G90.522 COMPLEX REGIONAL PAIN SYNDROME I OF LEFT LOWER LIMB: ICD-10-CM

## 2023-08-14 RX ORDER — GABAPENTIN 300 MG/1
CAPSULE ORAL
Status: CANCELLED | OUTPATIENT
Start: 2023-08-14

## 2023-08-14 NOTE — TELEPHONE ENCOUNTER
Caller: Amelia Love    Medication Name and Dosage: gabapentin (NEURONTIN) 300 MG Cap        Medication amount left: will be out next month    Preferred Pharmacy: [facility]    Other questions (Topic): Amelia will be out of her medication next month. She received a call to reschedule her appointment as  will not be in office.     Callback Number (Will only call for issues): [phone]    Thanks.

## 2023-08-15 RX ORDER — GABAPENTIN 300 MG/1
300 CAPSULE ORAL 3 TIMES DAILY
Qty: 90 CAPSULE | Refills: 5 | Status: SHIPPED | OUTPATIENT
Start: 2023-08-15 | End: 2023-11-15

## 2023-08-23 ENCOUNTER — APPOINTMENT (RX ONLY)
Dept: URBAN - METROPOLITAN AREA CLINIC 4 | Facility: CLINIC | Age: 38
Setting detail: DERMATOLOGY
End: 2023-08-23

## 2023-08-23 DIAGNOSIS — L81.4 OTHER MELANIN HYPERPIGMENTATION: ICD-10-CM

## 2023-08-23 DIAGNOSIS — D22 MELANOCYTIC NEVI: ICD-10-CM

## 2023-08-23 DIAGNOSIS — G90.522 COMPLEX REGIONAL PAIN SYNDROME I OF LEFT LOWER LIMB: ICD-10-CM

## 2023-08-23 DIAGNOSIS — Z71.89 OTHER SPECIFIED COUNSELING: ICD-10-CM

## 2023-08-23 DIAGNOSIS — G62.9 NEUROPATHY: ICD-10-CM

## 2023-08-23 DIAGNOSIS — L82.0 INFLAMED SEBORRHEIC KERATOSIS: ICD-10-CM

## 2023-08-23 PROBLEM — D22.9 MELANOCYTIC NEVI, UNSPECIFIED: Status: ACTIVE | Noted: 2023-08-23

## 2023-08-23 PROBLEM — D48.5 NEOPLASM OF UNCERTAIN BEHAVIOR OF SKIN: Status: ACTIVE | Noted: 2023-08-23

## 2023-08-23 PROCEDURE — ? BIOPSY BY SHAVE METHOD

## 2023-08-23 PROCEDURE — 11102 TANGNTL BX SKIN SINGLE LES: CPT

## 2023-08-23 PROCEDURE — 99212 OFFICE O/P EST SF 10 MIN: CPT | Mod: 25

## 2023-08-23 PROCEDURE — ? COUNSELING

## 2023-08-23 ASSESSMENT — LOCATION ZONE DERM: LOCATION ZONE: FACE

## 2023-08-23 ASSESSMENT — LOCATION DETAILED DESCRIPTION DERM: LOCATION DETAILED: RIGHT INFERIOR FOREHEAD

## 2023-08-23 ASSESSMENT — LOCATION SIMPLE DESCRIPTION DERM: LOCATION SIMPLE: RIGHT FOREHEAD

## 2023-08-24 RX ORDER — GABAPENTIN 300 MG/1
300 CAPSULE ORAL 3 TIMES DAILY
Qty: 270 CAPSULE | Refills: 3 | Status: SHIPPED | OUTPATIENT
Start: 2023-08-24 | End: 2024-08-18

## 2023-10-11 SDOH — HEALTH STABILITY: PHYSICAL HEALTH: ON AVERAGE, HOW MANY DAYS PER WEEK DO YOU ENGAGE IN MODERATE TO STRENUOUS EXERCISE (LIKE A BRISK WALK)?: 3 DAYS

## 2023-10-11 SDOH — HEALTH STABILITY: PHYSICAL HEALTH: ON AVERAGE, HOW MANY MINUTES DO YOU ENGAGE IN EXERCISE AT THIS LEVEL?: 60 MIN

## 2023-10-11 SDOH — HEALTH STABILITY: MENTAL HEALTH
STRESS IS WHEN SOMEONE FEELS TENSE, NERVOUS, ANXIOUS, OR CAN'T SLEEP AT NIGHT BECAUSE THEIR MIND IS TROUBLED. HOW STRESSED ARE YOU?: RATHER MUCH

## 2023-10-11 SDOH — ECONOMIC STABILITY: HOUSING INSECURITY: IN THE LAST 12 MONTHS, HOW MANY PLACES HAVE YOU LIVED?: 1

## 2023-10-11 SDOH — ECONOMIC STABILITY: FOOD INSECURITY: WITHIN THE PAST 12 MONTHS, THE FOOD YOU BOUGHT JUST DIDN'T LAST AND YOU DIDN'T HAVE MONEY TO GET MORE.: NEVER TRUE

## 2023-10-11 SDOH — ECONOMIC STABILITY: FOOD INSECURITY: WITHIN THE PAST 12 MONTHS, YOU WORRIED THAT YOUR FOOD WOULD RUN OUT BEFORE YOU GOT MONEY TO BUY MORE.: NEVER TRUE

## 2023-10-11 SDOH — ECONOMIC STABILITY: INCOME INSECURITY: IN THE LAST 12 MONTHS, WAS THERE A TIME WHEN YOU WERE NOT ABLE TO PAY THE MORTGAGE OR RENT ON TIME?: NO

## 2023-10-11 SDOH — ECONOMIC STABILITY: INCOME INSECURITY: HOW HARD IS IT FOR YOU TO PAY FOR THE VERY BASICS LIKE FOOD, HOUSING, MEDICAL CARE, AND HEATING?: NOT HARD AT ALL

## 2023-10-11 ASSESSMENT — SOCIAL DETERMINANTS OF HEALTH (SDOH)
HOW OFTEN DO YOU GET TOGETHER WITH FRIENDS OR RELATIVES?: ONCE A WEEK
HOW OFTEN DO YOU GET TOGETHER WITH FRIENDS OR RELATIVES?: ONCE A WEEK
HOW OFTEN DO YOU HAVE A DRINK CONTAINING ALCOHOL: MONTHLY OR LESS
HOW OFTEN DO YOU ATTEND CHURCH OR RELIGIOUS SERVICES?: PATIENT DECLINED
IN A TYPICAL WEEK, HOW MANY TIMES DO YOU TALK ON THE PHONE WITH FAMILY, FRIENDS, OR NEIGHBORS?: MORE THAN THREE TIMES A WEEK
WITHIN THE PAST 12 MONTHS, YOU WORRIED THAT YOUR FOOD WOULD RUN OUT BEFORE YOU GOT THE MONEY TO BUY MORE: NEVER TRUE
HOW OFTEN DO YOU ATTENT MEETINGS OF THE CLUB OR ORGANIZATION YOU BELONG TO?: PATIENT DECLINED
DO YOU BELONG TO ANY CLUBS OR ORGANIZATIONS SUCH AS CHURCH GROUPS UNIONS, FRATERNAL OR ATHLETIC GROUPS, OR SCHOOL GROUPS?: NO
HOW OFTEN DO YOU ATTENT MEETINGS OF THE CLUB OR ORGANIZATION YOU BELONG TO?: PATIENT DECLINED
HOW HARD IS IT FOR YOU TO PAY FOR THE VERY BASICS LIKE FOOD, HOUSING, MEDICAL CARE, AND HEATING?: NOT HARD AT ALL
DO YOU BELONG TO ANY CLUBS OR ORGANIZATIONS SUCH AS CHURCH GROUPS UNIONS, FRATERNAL OR ATHLETIC GROUPS, OR SCHOOL GROUPS?: NO
HOW OFTEN DO YOU ATTEND CHURCH OR RELIGIOUS SERVICES?: PATIENT DECLINED
HOW MANY DRINKS CONTAINING ALCOHOL DO YOU HAVE ON A TYPICAL DAY WHEN YOU ARE DRINKING: 1 OR 2
HOW OFTEN DO YOU HAVE SIX OR MORE DRINKS ON ONE OCCASION: NEVER
IN A TYPICAL WEEK, HOW MANY TIMES DO YOU TALK ON THE PHONE WITH FAMILY, FRIENDS, OR NEIGHBORS?: MORE THAN THREE TIMES A WEEK

## 2023-10-11 ASSESSMENT — LIFESTYLE VARIABLES
HOW OFTEN DO YOU HAVE A DRINK CONTAINING ALCOHOL: MONTHLY OR LESS
SKIP TO QUESTIONS 9-10: 1
AUDIT-C TOTAL SCORE: 1
HOW OFTEN DO YOU HAVE SIX OR MORE DRINKS ON ONE OCCASION: NEVER
HOW MANY STANDARD DRINKS CONTAINING ALCOHOL DO YOU HAVE ON A TYPICAL DAY: 1 OR 2

## 2023-10-12 ENCOUNTER — OFFICE VISIT (OUTPATIENT)
Dept: MEDICAL GROUP | Facility: LAB | Age: 38
End: 2023-10-12
Payer: COMMERCIAL

## 2023-10-12 VITALS
HEART RATE: 68 BPM | WEIGHT: 162.4 LBS | OXYGEN SATURATION: 97 % | RESPIRATION RATE: 14 BRPM | BODY MASS INDEX: 22.73 KG/M2 | DIASTOLIC BLOOD PRESSURE: 64 MMHG | HEIGHT: 71 IN | SYSTOLIC BLOOD PRESSURE: 106 MMHG | TEMPERATURE: 98.2 F

## 2023-10-12 DIAGNOSIS — Z23 NEED FOR VACCINATION: ICD-10-CM

## 2023-10-12 DIAGNOSIS — Z30.41 ENCOUNTER FOR BIRTH CONTROL PILLS MAINTENANCE: ICD-10-CM

## 2023-10-12 DIAGNOSIS — E03.8 SUBCLINICAL HYPOTHYROIDISM: ICD-10-CM

## 2023-10-12 DIAGNOSIS — E78.5 DYSLIPIDEMIA: ICD-10-CM

## 2023-10-12 DIAGNOSIS — G90.522 COMPLEX REGIONAL PAIN SYNDROME TYPE 1 OF LEFT LOWER EXTREMITY: ICD-10-CM

## 2023-10-12 DIAGNOSIS — Z00.00 WELLNESS EXAMINATION: ICD-10-CM

## 2023-10-12 DIAGNOSIS — G43.009 MIGRAINE WITHOUT AURA AND WITHOUT STATUS MIGRAINOSUS, NOT INTRACTABLE: ICD-10-CM

## 2023-10-12 PROBLEM — B97.7 HPV IN FEMALE: Status: RESOLVED | Noted: 2021-09-16 | Resolved: 2023-10-12

## 2023-10-12 PROBLEM — L60.8 NAIL DEFORMITY: Status: RESOLVED | Noted: 2020-01-02 | Resolved: 2023-10-12

## 2023-10-12 PROCEDURE — 99395 PREV VISIT EST AGE 18-39: CPT | Mod: 25 | Performed by: NURSE PRACTITIONER

## 2023-10-12 PROCEDURE — 90471 IMMUNIZATION ADMIN: CPT | Performed by: NURSE PRACTITIONER

## 2023-10-12 PROCEDURE — 3078F DIAST BP <80 MM HG: CPT | Performed by: NURSE PRACTITIONER

## 2023-10-12 PROCEDURE — 90686 IIV4 VACC NO PRSV 0.5 ML IM: CPT | Performed by: NURSE PRACTITIONER

## 2023-10-12 PROCEDURE — 3074F SYST BP LT 130 MM HG: CPT | Performed by: NURSE PRACTITIONER

## 2023-10-12 RX ORDER — LEVONORGESTREL AND ETHINYL ESTRADIOL 0.15-0.03
1 KIT ORAL DAILY
Qty: 91 TABLET | Refills: 3 | Status: SHIPPED | OUTPATIENT
Start: 2023-10-12

## 2023-10-12 RX ORDER — SUMATRIPTAN 50 MG/1
50 TABLET, FILM COATED ORAL
Qty: 10 TABLET | Refills: 3 | Status: SHIPPED | OUTPATIENT
Start: 2023-10-12 | End: 2023-11-15

## 2023-10-12 ASSESSMENT — PATIENT HEALTH QUESTIONNAIRE - PHQ9: CLINICAL INTERPRETATION OF PHQ2 SCORE: 0

## 2023-10-12 ASSESSMENT — FIBROSIS 4 INDEX: FIB4 SCORE: 0.73

## 2023-10-12 NOTE — ASSESSMENT & PLAN NOTE
Chronic condition. Followed by neurology. Currently taking gabapentin 300 mg TID with good relief of symptoms.

## 2023-10-12 NOTE — ASSESSMENT & PLAN NOTE
Chronic condition. Due for labs. Patient currently managing with lifestyle modifications. Denies chest pain, shortness of breath, heart palpitations.    Lab Results   Component Value Date/Time    CHOLSTRLTOT 226 (H) 03/23/2022 05:49 AM    CHOLSTRLTOT 171 11/21/2019 06:54 AM     (H) 11/21/2019 06:54 AM    HDL 54 03/23/2022 05:49 AM    HDL 47 11/21/2019 06:54 AM    TRIGLYCERIDE 117 03/23/2022 05:49 AM    TRIGLYCERIDE 80 11/21/2019 06:54 AM

## 2023-10-12 NOTE — ASSESSMENT & PLAN NOTE
Currently on OCP, taking them as prescribed. Would like a refill at this time. No family or personal history of clotting disorders, PEs, or DVTs. No migraines w/aura. Patient does not smoke. She is not sedentary.

## 2023-10-12 NOTE — PROGRESS NOTES
Subjective:     CC:    Chief Complaint   Patient presents with    Annual Exam    Requesting Labs     HISTORY OF THE PRESENT ILLNESS: Patient is a 38 y.o. female. This pleasant patient is here today to establish care and discuss the following:    Wellness Examination     - Dyslipidemia (30-45): due  - Diabetes (HTN, HLD, BMI >25): due  - Depression screening (PHQ-2 and/or PHQ-9): 0  - Osteoporosis: Ca intake, DEXA (>65 or with risk factors): n/a  - If fx, needs BMD test or tx w/i 6 months of dx  - Dental: sees dentist regularly  - Eye: sees optometrist regularly  AAA Screening (Once in men 65-75 years who have ever smoked): n/a  Diet: balanced  Exercise: regular  Substance Use: none  Tobacco Use/counseling: n/a  Safe in relationship: yes  Seat belts, bike helmet, gun safety discussed.  Sun protection used.     Cancer screening  - Colon CA (45-75) - FIT (annual) cspy (q10yr): n/a  - Lung CA (50-81y/o w/20py smoking hx & currently smoke or quit w/i past 15 yrs): n/a  - Prostate Cancer Screening/PSA: n/a  - Cervical CA (21-65): due 2024  -  HX Abnormal pap/HPV: none  - Breast CA: mammo (required 50-73yo) or starting 40 (ACOG, ACR), 45 (ACS), 50 (USPTF): n/a  - Skin cancer screening: annually     Infectious disease screening/Immunizations  --STI/HIV Screening: n/a  --Practices safe sex.  --Hepatitis C Screening (18 to 78 yo): n/a  --Immunizations:               Influenza: due   Covid-19: UTD              Tetanus: UTD              Shingles: n/a    Complex regional pain syndrome type 1 of left lower extremity  Chronic condition. Followed by neurology. Currently taking gabapentin 300 mg TID with good relief of symptoms.    Dyslipidemia  Chronic condition. Due for labs. Patient currently managing with lifestyle modifications. Denies chest pain, shortness of breath, heart palpitations.    Lab Results   Component Value Date/Time    CHOLSTRLTOT 226 (H) 03/23/2022 05:49 AM    CHOLSTRLTOT 171 11/21/2019 06:54 AM     (H)  "11/21/2019 06:54 AM    HDL 54 03/23/2022 05:49 AM    HDL 47 11/21/2019 06:54 AM    TRIGLYCERIDE 117 03/23/2022 05:49 AM    TRIGLYCERIDE 80 11/21/2019 06:54 AM         Encounter for surveillance of contraceptive pills  Currently on OCP, taking them as prescribed. Would like a refill at this time. No family or personal history of clotting disorders, PEs, or DVTs. No migraines w/aura. Patient does not smoke. She is not sedentary.    Migraine without aura and without status migrainosus, not intractable  Has noticed that she is getting more frequent migraines, will get one about every month. Thinks that overall this year she will have 3-4 per year. Will usually take ibuprofen with minimal relief.     ROS:   Gen: no fevers/chills, no changes in weight  Eyes: no changes in vision  ENT: no sore throat, no hearing loss, no bloody nose  Pulm: no sob, no cough  CV: no chest pain, no palpitations  GI: no nausea/vomiting, no diarrhea  : no dysuria  MSk: no myalgias  Skin: no rash  Neuro: no headaches, no numbness/tingling  Heme/Lymph: no easy bruising        - NOTE: All other systems reviewed and are negative, except as in HPI.      Objective:     Exam: /64 (BP Location: Right arm, Patient Position: Sitting, BP Cuff Size: Adult)   Pulse 68   Temp 36.8 °C (98.2 °F)   Resp 14   Ht 1.803 m (5' 11\")   Wt 73.7 kg (162 lb 6.4 oz)   SpO2 97%  Body mass index is 22.65 kg/m².    General: Normal appearing. No distress.  HEENT: Normocephalic. Eyes conjunctiva clear lids without ptosis, pupils equal and reactive to light accommodation, ears normal shape and contour, canals are clear bilaterally, tympanic membranes are benign, nasal mucosa benign, oropharynx is without erythema, edema or exudates.   Neck: Supple without JVD or bruit. Thyroid is not enlarged.  Pulmonary: Clear to ausculation.  Normal effort. No rales, ronchi, or wheezing.  Cardiovascular: Regular rate and rhythm without murmur. Carotid and radial pulses are " intact and equal bilaterally.  Neurologic: Grossly nonfocal  Lymph: No cervical or supraclavicular lymph nodes are palpable  Skin: Warm and dry.  No obvious lesions.  Musculoskeletal: Normal gait. No extremity cyanosis, clubbing, or edema.  Psych: Normal mood and affect. Alert and oriented x3. Judgment and insight is normal.    Assessment & Plan:   38 y.o. female with the following -    1. Wellness examination  This is a healthy 38-year-old female here today for a preventative exam.  Previous medical history, healthcare maintenance and immunization status reviewed.  Patient is up to date.  Annual labwork ordered.  See discussion of anticipatory guidance below.  Patient will return annually for preventative exams.    Dentist and eye doctor   Labs per orders.    Anticipatory guidance  Counseling about diet, supplements, exercise, skin care and safe sex.    - CBC WITH DIFFERENTIAL; Future  - Comp Metabolic Panel; Future  - Lipid Profile; Future  - HEMOGLOBIN A1C; Future  - VITAMIN D,25 HYDROXY (DEFICIENCY); Future  - TSH; Future  - FREE THYROXINE; Future    2. Subclinical hypothyroidism  Labs ordered.   - TSH; Future  - FREE THYROXINE; Future    3. Need for vaccination  Patient was agreeable to receiving the flu vaccine in clinic today after discussion of potential risks, benefits, and side effects. Vaccine administered without adverse effects.  - Influenza Vaccine Quad Injection (PF)    4. Encounter for birth control pills maintenance  Medication refilled . Advised not to smoke while on hormonal birth control.  - levonorgestrel-ethinyl estradiol (SEASONALE) 0.15-0.03 MG per tablet; Take 1 Tablet by mouth every day.  Dispense: 91 Tablet; Refill: 3    5. Complex regional pain syndrome type 1 of left lower extremity  Chronic condition. Continue medication as prescribed. Continue to follow up with neurology.     6. Dyslipidemia  Chronic condition. Labs as indicated.  Continue lifestyle modifications.    7. Migraine  without aura and without status migrainosus, not intractable  Chronic condition. Patient to take medication as prescribed. Side effects of medication prescribed today were discussed with the patient including how to take the medication and proper dosage. Discussed repercussions of not taking the medication as prescribed. Instructed to call the office should she have any negative side effects or problems with the medication.  Encourage patient to keep a headache diary to identify triggers.  Low tyramine diet recommended.  Decrease caffeine, maintain regular sleep schedule and at least 30-minutes of exercise most days.  - SUMAtriptan (IMITREX) 50 MG Tab; Take 1 Tablet by mouth one time as needed for Migraine for up to 1 dose.  Dispense: 10 Tablet; Refill: 3

## 2023-10-12 NOTE — ASSESSMENT & PLAN NOTE
Has noticed that she is getting more frequent migraines, will get one about every month. Thinks that overall this year she will have 3-4 per year. Will usually take ibuprofen with minimal relief.

## 2023-10-20 ENCOUNTER — PHARMACY VISIT (OUTPATIENT)
Dept: PHARMACY | Facility: MEDICAL CENTER | Age: 38
End: 2023-10-20
Payer: MEDICARE

## 2023-10-20 PROCEDURE — RXMED WILLOW AMBULATORY MEDICATION CHARGE: Performed by: INTERNAL MEDICINE

## 2023-10-20 RX ORDER — COVID-19 VACCINE, MRNA 0.23 MG/2.25ML
0.3 INJECTION, SUSPENSION INTRAMUSCULAR
Qty: 0.3 ML | Refills: 0 | Status: SHIPPED | OUTPATIENT
Start: 2023-10-20 | End: 2023-11-15

## 2023-10-20 RX ORDER — INFLUENZA A VIRUS A/BRISBANE/02/2018 IVR-190 (H1N1) ANTIGEN (FORMALDEHYDE INACTIVATED), INFLUENZA A VIRUS A/KANSAS/14/2017 X-327 (H3N2) ANTIGEN (FORMALDEHYDE INACTIVATED), INFLUENZA B VIRUS B/PHUKET/3073/2013 ANTIGEN (FORMALDEHYDE INACTIVATED), AND INFLUENZA B VIRUS B/MARYLAND/15/2016 BX-69A ANTIGEN (FORMALDEHYDE INACTIVATED) 15; 15; 15; 15 UG/.5ML; UG/.5ML; UG/.5ML; UG/.5ML
0.5 INJECTION, SUSPENSION INTRAMUSCULAR
Qty: 0.5 ML | Refills: 0 | Status: SHIPPED | OUTPATIENT
Start: 2023-10-20 | End: 2023-11-15

## 2023-11-15 ENCOUNTER — OFFICE VISIT (OUTPATIENT)
Dept: NEUROLOGY | Facility: MEDICAL CENTER | Age: 38
End: 2023-11-15
Attending: STUDENT IN AN ORGANIZED HEALTH CARE EDUCATION/TRAINING PROGRAM
Payer: COMMERCIAL

## 2023-11-15 VITALS
SYSTOLIC BLOOD PRESSURE: 112 MMHG | WEIGHT: 166.89 LBS | DIASTOLIC BLOOD PRESSURE: 70 MMHG | OXYGEN SATURATION: 99 % | BODY MASS INDEX: 23.36 KG/M2 | TEMPERATURE: 98.6 F | HEIGHT: 71 IN | HEART RATE: 83 BPM

## 2023-11-15 DIAGNOSIS — Z83.49 FAMILY HISTORY OF HYPOTHYROIDISM: ICD-10-CM

## 2023-11-15 DIAGNOSIS — R51.9 NEW ONSET HEADACHE: ICD-10-CM

## 2023-11-15 DIAGNOSIS — G90.522 COMPLEX REGIONAL PAIN SYNDROME I OF LEFT LOWER LIMB: ICD-10-CM

## 2023-11-15 DIAGNOSIS — E78.5 DYSLIPIDEMIA: ICD-10-CM

## 2023-11-15 DIAGNOSIS — R20.0 LEFT SIDED NUMBNESS: ICD-10-CM

## 2023-11-15 DIAGNOSIS — G45.9 TIA (TRANSIENT ISCHEMIC ATTACK): ICD-10-CM

## 2023-11-15 DIAGNOSIS — G62.9 NEUROPATHY: ICD-10-CM

## 2023-11-15 PROCEDURE — 99215 OFFICE O/P EST HI 40 MIN: CPT | Performed by: STUDENT IN AN ORGANIZED HEALTH CARE EDUCATION/TRAINING PROGRAM

## 2023-11-15 PROCEDURE — 3074F SYST BP LT 130 MM HG: CPT | Performed by: STUDENT IN AN ORGANIZED HEALTH CARE EDUCATION/TRAINING PROGRAM

## 2023-11-15 PROCEDURE — 99211 OFF/OP EST MAY X REQ PHY/QHP: CPT | Performed by: STUDENT IN AN ORGANIZED HEALTH CARE EDUCATION/TRAINING PROGRAM

## 2023-11-15 PROCEDURE — 99212 OFFICE O/P EST SF 10 MIN: CPT | Performed by: STUDENT IN AN ORGANIZED HEALTH CARE EDUCATION/TRAINING PROGRAM

## 2023-11-15 PROCEDURE — 3078F DIAST BP <80 MM HG: CPT | Performed by: STUDENT IN AN ORGANIZED HEALTH CARE EDUCATION/TRAINING PROGRAM

## 2023-11-15 ASSESSMENT — FIBROSIS 4 INDEX: FIB4 SCORE: 0.73

## 2023-11-15 NOTE — PROGRESS NOTES
"NEUROLOGY FOLLOW-UP - 11/15/2023     REASON FOR VISIT: Amelia Love 38 y.o. female presents today for follow-up.     SUMMARY PROBLEMS/MEDICAL CONDITIONS AND PRIOR MED HX:    INTERVAL HISTORY:  Patient returns for routine follow-up for complex regional pain syndrome of the left foot.  She is doing very well on gabapentin 300 x 3 times per day and will use an additional 100 mg as needed.  She reports 300 mg 3 times daily has effectively controlled her symptoms. NO longer needing the additional 100 mg as needed helps for her further breakthrough discomfort.       Has episodic migraines once per month, holoacranial, severe, disabling. Recently prescribed imitrex by PCP but has not tried it yet. Started having them a couple of year ago. +Stress trigger. + nausea/    She had an episode of left arm numbness, sudden onset, lasted 4 hours,     CURRENT MEDICATIONS AT THE TIME OF THIS ENCOUNTER:  Current Outpatient Medications on File Prior to Visit   Medication Sig Dispense Refill    levonorgestrel-ethinyl estradiol (SEASONALE) 0.15-0.03 MG per tablet Take 1 Tablet by mouth every day. 91 Tablet 3    gabapentin (NEURONTIN) 300 MG Cap Take 1 Capsule by mouth 3 times a day for 360 days. 270 Capsule 3    VITAMIN D PO Take  by mouth.       No current facility-administered medications on file prior to visit.        EXAM:   /70 (BP Location: Right arm, Patient Position: Sitting, BP Cuff Size: Adult)   Pulse 83   Temp 37 °C (98.6 °F) (Temporal)   Ht 1.803 m (5' 11\")   Wt 75.7 kg (166 lb 14.2 oz)   SpO2 99%             Physical Exam:  Physical Exam  Constitutional:       General: She is not in acute distress.     Appearance: She is not ill-appearing.   HENT:      Head: Normocephalic and atraumatic.      Nose: Nose normal. No congestion or rhinorrhea.      Mouth/Throat:      Mouth: Mucous membranes are moist.      Pharynx: No oropharyngeal exudate or posterior oropharyngeal erythema.   Cardiovascular:      Rate and " Rhythm: Normal rate and regular rhythm.      Pulses: Normal pulses.      Heart sounds: Normal heart sounds.   Pulmonary:      Effort: Pulmonary effort is normal.      Breath sounds: Normal breath sounds. No wheezing.   Musculoskeletal:         General: No swelling or tenderness. Normal range of motion.      Cervical back: Normal range of motion and neck supple. No rigidity.   Skin:     General: Skin is warm and dry.      Findings: Erythema (Very mild erythematous swelling of the left lower extremity) present.   Neurological:      Mental Status: She is alert.      Motor: Motor strength is normal.       Neurological Exam   Neurological Exam  Mental Status  Alert.    Motor  Normal muscle bulk throughout. No fasciculations present. Strength is 5/5 throughout all four extremities.    Sensory  Sensation of the lower extremity intact with out allodynia or dysesthesias.       PERTINENT NOTES AND DIAGNOSTICS DATA PERSONALLY REVIEW:      ASSESSMENT, EDUCATION, AND COUNSELING:  This is a 38 y.o. female patient who presents to the neurology clinic. We had an extensive discussion about the patient's symptoms, signs, and work-up to date, if any. We discussed potential and/or definitive diagnoses, work-up, and potential treatments.       PLAN:  If applicable, the work-up such as labs, imaging, procedures, and/or other testing, referrals, and/or recommended treatment strategies are listed below.    Visit Diagnoses     ICD-10-CM   1. Complex regional pain syndrome i of left lower limb  G90.522   2. Neuropathy  G62.9   3. New onset headache  R51.9   4. Left sided numbness  R20.0   5. TIA (transient ischemic attack)  G45.9   6. Dyslipidemia  E78.5   7. Family history of hypothyroidism  Z83.49        Orders Placed This Encounter    MR-VENOGRAM (MRV) HEAD        Patient with CRPS controlled on gapapentin. No indication to make changes. Gapaneint refilled.    Of note, patient has new onset headaches every month, severe, incapating  ,  with migraine phenotype. Last year however she had episode of acute left onset arm numbness that resolved after 4 hours with analgesics and fluids. Patient is on OCP. PCP prescribed imitrex which she has not started yet. Symptoms have not recurred and she is non-focal. However, secondary headache syndrome like cerebral venous sinus thrombosis. Ordering MRV head. She has labs ordered by PCP so she does not need me to order those.          BILLING DOCUMENTATION:     I spent a total of I spent a total of 40 minutes on the day of the visit.   of face-to-face time in this visit. Over 50% of the time of the visit today was spent on counseling and/or coordination of care wtih the patient and/or family, as above in assessment in plan.    Abraham Mcgrath MD  Department of Neurology at [facility]  Diplomate of the American Board of Psychiatry and Neurology, General Neurology  Diplomate of American Board of Psychiatry and Neurology, a Member Board of the American Board of Medical Subspecialties, Epilepsy  Director of [facility]'s Level III Comprehensive Epilepsy Program  Professor of Clinical Neurology, [employer] [facility] [employer].   [address], [address]  [facility] [id number] [phone]   Fax: [phone]  E-mail: [email or web address]

## 2023-11-15 NOTE — PATIENT INSTRUCTIONS
NEUROLOGY CLINIC VISIT WITH DR. MCGRATH       PATIENT EXPECTATIONS AND IMPORTANT APPOINTMENT REMINDERS:   You matter to Dr. Mcgrath and you deserve the best care.   Dr. Mcgrath prides himself on providing the best possible care to all his patients. He strives to make each appointment meaningful, so that all your concerns are being addressed and all your neurological problems are being optimally treated. In order to achieve these goals for everyone, Dr. Mcgrath has listed important reminders and the best ways to prepare for each appointment. Please read each item carefully. Thank you!    REFILLS:   Request refills AT LEAST 1 week in advance to ensure you do not run out of medications    [facility]  It is STRONGLY encouraged that ALL patients sign up for [employer]. It is BY FAR the fastest and most convenient way for both Dr. Mcgrath and patients to obtain timely refills.  If you are having trouble signing up or logging into your account, staff are available to help you. Please ask a medical assistant or staff at the  to assist you.    TEST RESULS:   All labs and diagnostic test results will be reviewed at your next visit, UNLESS  Dr. Mcgrath determines that there are important findings on the tests need to be acted on sooner. Dr. Mcgrath will either call or send a message through [employer] if this is the case.    BE PREPARED PRIOR TO EVERY APPOINTMENT:  All patient are responsible for ensuring that ALL test results that were completed outside of the [facility] system have been received by our Neurology Department PRIOR to your appointment with Dr. Mcgrath.    IMPORTANT:  ALL images (not just the reports) must be sent and uploaded to the [facility] system. Dr. Mcgrath reviews all images personally prior to each visit. Ensuring that ALL the test results and test images are accessible to Dr. Mcgrath prior to your appointment is YOUR responsibility and an important part of making the most out of each appointment.   Bring a  [facility] picture ID and an updated insurance card EVERY visit.  It is highly recommended that you bring at every visit a list of the most important topics that you want address. While it may not be possible to address all items on the list in a single visit, preparing a list will ensure that Dr. Mcgrath addresses the items that are most important to you and your health    PAPERWORK, DOCUMENTATION, LETTER REQUESTS:  You must notify the office ahead of your appointment of all paperwork or letter requests.   Please DO NOT wait until the last minute to make these requests. Please give all paperwork to the medical assistant at the start of the appointment and check-in process. Please note that Dr. Mcgrath may not be able complete some types of documentation in a single appointment or even within a single day or week. This is why it is important to communicate paperwork requests prior to your appointment and at least 2 weeks prior to any deadlines.    KNOW ALL YOU MEDICATIONS:   AT EVERY SINGLE APPOINTMENT, please bring a list of every single prescribed, non-prescribed, and over the counter medication or supplements you are taking, including ones taken on a rare or intermittent basis.  Include the following information for each prescribed or non-prescribed medications:  Name of medication   The strength of EACH pill/capsule/tablet, etc.   The number of pills/capsules/tablets, etc taken per dose  The number and time of day that doses are taken  For every single Supplement that you take on a routine or intermittent basis, you must include:  The Brand Name   A complete list of every single ingredient, compound, vitamin, and/or mineral in each dose, along with the corresponding amounts/strengths of all ingredients, vitamins, minerals, etc., if such information is provided or known  The number of doses taken per day and time of day doses are taken  If medications are taken on an intermittent or as needed basis, please  "estimate how many days per week or days per month the medications are used  DO NOT just print out your medication list from [employer] or bring a list from a prior appointment or hospitalizations because the information is often often unreliable, inaccurate, outdated, and/or incomplete   The list should be printed or written  If you forget or do not have a list of all the medication, then it is acceptable, although less preferred, to bring all the bottles to the appointment     ARRIVE EARLY FOR ALL VISITS:  Please note that we are unable to accommodate late arrivals as per office policy.  YOU-the patient - (NOT a parent, spouse, or friend) must be physically present at check-in no later than 12 minutes after the scheduled appointment time, or you will be asked to reschedule   Consider scheduling a virtual appointment with Dr. Mcgrath through [employer] as an alternative if transportation to the clinic is difficult or unavailable   Please note, however, that virtual visits can only be scheduled after being an established patient of Dr. Mcgrath. All new appointments must be done in-person in clinic  Some insurances will not cover the cost of virtual appointments. Please check with your insurance to find out if these visits are covered    COMMUNICATING URGENT AND NON-URGENT MATTERS:  Your concerns are important and deserve to be heard and addressed. If you have an urgent matter, there are two methods that will ensure your concerns are prioritized appropriately:   Preferred method: Sign-up/Login to your [employer] account and send a message addressed to Dr. Mcgrath or Trista Magaña (Dr. Mcgrath's assistant). In the subject line, type \"urgent\" followed by a word or phrase describing the situation (For example, write \"Urgent: Out of antiseuzre med and need refill\" or \"Urgent: Severe side effects to new meds\". In doing this, our staff can ensure urgent messages are triaged appropriately and communicated to Dr. Mcgrath that day.  Call " [facility] main line at [phone]. Dr. Mcgrath's voicemail extension is [phone]. When leaving a voice message, specifically indicate if it is urgent (or non-urgent) so that the matter can be triaged appropriately and addressed in a timely manner    Thank you for taking important action in your care!

## 2024-02-13 ENCOUNTER — APPOINTMENT (RX ONLY)
Dept: URBAN - METROPOLITAN AREA CLINIC 35 | Facility: CLINIC | Age: 39
Setting detail: DERMATOLOGY
End: 2024-02-13

## 2024-02-13 DIAGNOSIS — Z71.89 OTHER SPECIFIED COUNSELING: ICD-10-CM

## 2024-02-13 DIAGNOSIS — L81.4 OTHER MELANIN HYPERPIGMENTATION: ICD-10-CM

## 2024-02-13 DIAGNOSIS — D18.0 HEMANGIOMA: ICD-10-CM

## 2024-02-13 DIAGNOSIS — D22 MELANOCYTIC NEVI: ICD-10-CM

## 2024-02-13 DIAGNOSIS — L82.1 OTHER SEBORRHEIC KERATOSIS: ICD-10-CM

## 2024-02-13 DIAGNOSIS — L56.5 DISSEMINATED SUPERFICIAL ACTINIC POROKERATOSIS (DSAP): ICD-10-CM

## 2024-02-13 PROBLEM — D22.72 MELANOCYTIC NEVI OF LEFT LOWER LIMB, INCLUDING HIP: Status: ACTIVE | Noted: 2024-02-13

## 2024-02-13 PROBLEM — D22.5 MELANOCYTIC NEVI OF TRUNK: Status: ACTIVE | Noted: 2024-02-13

## 2024-02-13 PROBLEM — D18.01 HEMANGIOMA OF SKIN AND SUBCUTANEOUS TISSUE: Status: ACTIVE | Noted: 2024-02-13

## 2024-02-13 PROBLEM — D23.62 OTHER BENIGN NEOPLASM OF SKIN OF LEFT UPPER LIMB, INCLUDING SHOULDER: Status: ACTIVE | Noted: 2024-02-13

## 2024-02-13 PROCEDURE — ? ORDER FOR PHOTODYNAMIC THERAPY

## 2024-02-13 PROCEDURE — ? ADDITIONAL NOTES

## 2024-02-13 PROCEDURE — ? OBSERVATION AND MEASURE

## 2024-02-13 PROCEDURE — ? COUNSELING

## 2024-02-13 PROCEDURE — ? LIQUID NITROGEN

## 2024-02-13 PROCEDURE — 17000 DESTRUCT PREMALG LESION: CPT

## 2024-02-13 PROCEDURE — 99213 OFFICE O/P EST LOW 20 MIN: CPT | Mod: 25

## 2024-02-13 ASSESSMENT — LOCATION DETAILED DESCRIPTION DERM
LOCATION DETAILED: MID TRAPEZIAL NECK
LOCATION DETAILED: LEFT RIB CAGE
LOCATION DETAILED: PERIUMBILICAL SKIN
LOCATION DETAILED: LEFT MEDIAL SUPERIOR CHEST
LOCATION DETAILED: RIGHT LATERAL BREAST 6-7:00 REGION
LOCATION DETAILED: RIGHT ANTERIOR DISTAL THIGH
LOCATION DETAILED: EPIGASTRIC SKIN
LOCATION DETAILED: LEFT MEDIAL UPPER BACK
LOCATION DETAILED: RIGHT DISTAL DORSAL FOREARM
LOCATION DETAILED: RIGHT INFERIOR LATERAL FOREHEAD
LOCATION DETAILED: LEFT ACHILLES SKIN
LOCATION DETAILED: LEFT DISTAL DORSAL FOREARM

## 2024-02-13 ASSESSMENT — LOCATION SIMPLE DESCRIPTION DERM
LOCATION SIMPLE: LEFT ACHILLES SKIN
LOCATION SIMPLE: RIGHT FOREHEAD
LOCATION SIMPLE: RIGHT FOREARM
LOCATION SIMPLE: LEFT UPPER BACK
LOCATION SIMPLE: TRAPEZIAL NECK
LOCATION SIMPLE: CHEST
LOCATION SIMPLE: RIGHT BREAST
LOCATION SIMPLE: LEFT FOREARM
LOCATION SIMPLE: ABDOMEN
LOCATION SIMPLE: RIGHT THIGH

## 2024-02-13 ASSESSMENT — LOCATION ZONE DERM
LOCATION ZONE: FACE
LOCATION ZONE: ARM
LOCATION ZONE: TRUNK
LOCATION ZONE: LEG
LOCATION ZONE: NECK

## 2024-02-13 NOTE — PROCEDURE: ORDER FOR PHOTODYNAMIC THERAPY
Face And Ears Incubation Time: 1 Hour
Occlusion: No
Detail Level: Zone
Face And Scalp Incubation Time: 1 Hour for the face and 2 Hours for the scalp
Photosensitizer: Levulan
Consent: Written consent was obtained today.  The procedure and risks were reviewed with the patient including but not limited to: burning, pigmentary changes, pain, blistering, scabbing, redness, and the possibility of needing numerous treatments. Strict photoprotection after the procedure was also discussed.
Chest Incubation Time: 2 Hours
Face Incubation Time: 1.5 Hour
Pdt Type: Blue Light
Frequency Of Pdt: Single Treatment
Location: [facility]

## 2024-02-13 NOTE — PROCEDURE: LIQUID NITROGEN
Detail Level: Detailed
Show Aperture Variable?: Yes
Consent: The patient's consent was obtained including but not limited to risks of crusting, scabbing, blistering, scarring, darker or lighter pigmentary change, recurrence, incomplete removal and infection.
Number Of Freeze-Thaw Cycles: 1 freeze-thaw cycle
Render Post-Care Instructions In Note?: no
Duration Of Freeze Thaw-Cycle (Seconds): 10
Post-Care Instructions: I reviewed with the patient in detail post-care instructions. Patient is to wear sunprotection, and avoid picking at any of the treated lesions. Pt may apply Vaseline to crusted or scabbing areas.

## 2024-03-18 ENCOUNTER — APPOINTMENT (OUTPATIENT)
Dept: NEUROLOGY | Facility: MEDICAL CENTER | Age: 39
End: 2024-03-18
Attending: STUDENT IN AN ORGANIZED HEALTH CARE EDUCATION/TRAINING PROGRAM
Payer: COMMERCIAL

## 2024-03-28 ENCOUNTER — APPOINTMENT (OUTPATIENT)
Dept: LAB | Facility: MEDICAL CENTER | Age: 39
End: 2024-03-28
Attending: NURSE PRACTITIONER
Payer: COMMERCIAL

## 2024-04-01 ENCOUNTER — APPOINTMENT (OUTPATIENT)
Dept: MEDICAL GROUP | Facility: LAB | Age: 39
End: 2024-04-01
Payer: COMMERCIAL

## 2024-04-01 VITALS
WEIGHT: 166.4 LBS | OXYGEN SATURATION: 96 % | DIASTOLIC BLOOD PRESSURE: 64 MMHG | TEMPERATURE: 98 F | HEIGHT: 71 IN | SYSTOLIC BLOOD PRESSURE: 122 MMHG | BODY MASS INDEX: 23.3 KG/M2 | RESPIRATION RATE: 14 BRPM | HEART RATE: 78 BPM

## 2024-04-01 DIAGNOSIS — K59.00 CONSTIPATION, UNSPECIFIED CONSTIPATION TYPE: ICD-10-CM

## 2024-04-01 DIAGNOSIS — K60.2 ANAL FISSURE: ICD-10-CM

## 2024-04-01 DIAGNOSIS — K64.9 HEMORRHOIDS, UNSPECIFIED HEMORRHOID TYPE: ICD-10-CM

## 2024-04-01 PROCEDURE — 3078F DIAST BP <80 MM HG: CPT | Performed by: NURSE PRACTITIONER

## 2024-04-01 PROCEDURE — 3074F SYST BP LT 130 MM HG: CPT | Performed by: NURSE PRACTITIONER

## 2024-04-01 PROCEDURE — 99213 OFFICE O/P EST LOW 20 MIN: CPT | Performed by: NURSE PRACTITIONER

## 2024-04-01 NOTE — PROGRESS NOTES
"Subjective:     CC:   Chief Complaint   Patient presents with    Anal Fissure     Blood in stool , painful bowel movement , sitting for a short period get uncomfortable      HPI:   Amelia presents today with the following:    Blood in stool  Onset yesterday. Reports that she used her bidet and felt something \"tear\". She noticed quite a bit of bright red blood in the toilet bowl. Since then she has noticed some bright red blood when she wipes. She has had hemorrhoids in the past and what she thought was an anal fissure, this took several months to resolve. She is continuing to have some pain. Does report some constipation.    ROS:   As documented in history of present illness above    Objective:     Exam: /64 (BP Location: Right arm, Patient Position: Sitting, BP Cuff Size: Adult)   Pulse 78   Temp 36.7 °C (98 °F)   Resp 14   Ht 1.803 m (5' 11\")   Wt 75.5 kg (166 lb 6.4 oz)   SpO2 96%  Body mass index is 23.21 kg/m².    Constitutional: Alert, no distress, well-groomed.  Skin: Warm, dry, good turgor, no rashes in visible areas.  Eye: Equal, round and reactive, conjunctiva clear, lids normal.  ENMT: Lips without lesions, good dentition, moist mucous membranes.  Neck: Trachea midline, no masses, no thyromegaly.  Respiratory: Unlabored respiratory effort, no cough.  Rectal: small external hemorrhoidal tags, small fissure at 12 o'clock position  MSK: Normal gait, moves all extremities.  Neuro: Grossly non-focal.   Psych: Alert and oriented x3, normal affect and mood.    Assessment & Plan:     38 y.o. female with the following -     1. Anal fissure  2. Hemorrhoids, unspecified hemorrhoid type  3. Constipation, unspecified constipation type  Discussed OTC Tucks pads. Discussed dietary changes and increasing exercise, limiting time on toilet, stool softeners, and sitz baths. Encourage regular fruits and vegetables. Increase water intake. MiraLAX daily. Supportive care, differential diagnoses, and indications for " immediate follow-up discussed with patient. Pathogenesis of diagnosis discussed including typical length and natural progression. Instructed to return to clinic or nearest emergency department for any change in condition, further concerns, or worsening of symptoms.

## 2024-05-08 ENCOUNTER — OFFICE VISIT (OUTPATIENT)
Dept: MEDICAL GROUP | Facility: LAB | Age: 39
End: 2024-05-08
Payer: COMMERCIAL

## 2024-05-08 VITALS
OXYGEN SATURATION: 96 % | DIASTOLIC BLOOD PRESSURE: 64 MMHG | WEIGHT: 170.8 LBS | HEART RATE: 94 BPM | RESPIRATION RATE: 14 BRPM | TEMPERATURE: 98.3 F | BODY MASS INDEX: 23.91 KG/M2 | HEIGHT: 71 IN | SYSTOLIC BLOOD PRESSURE: 126 MMHG

## 2024-05-08 DIAGNOSIS — J06.9 UPPER RESPIRATORY TRACT INFECTION, UNSPECIFIED TYPE: ICD-10-CM

## 2024-05-08 DIAGNOSIS — H66.93 BILATERAL OTITIS MEDIA, UNSPECIFIED OTITIS MEDIA TYPE: ICD-10-CM

## 2024-05-08 PROCEDURE — 3078F DIAST BP <80 MM HG: CPT | Performed by: NURSE PRACTITIONER

## 2024-05-08 PROCEDURE — 3074F SYST BP LT 130 MM HG: CPT | Performed by: NURSE PRACTITIONER

## 2024-05-08 PROCEDURE — 87651 STREP A DNA AMP PROBE: CPT | Performed by: NURSE PRACTITIONER

## 2024-05-08 PROCEDURE — 0241U POCT CEPHEID COV-2, FLU A/B, RSV - PCR: CPT | Performed by: NURSE PRACTITIONER

## 2024-05-08 PROCEDURE — 99213 OFFICE O/P EST LOW 20 MIN: CPT | Performed by: NURSE PRACTITIONER

## 2024-05-08 RX ORDER — AMOXICILLIN AND CLAVULANATE POTASSIUM 875; 125 MG/1; MG/1
1 TABLET, FILM COATED ORAL 2 TIMES DAILY
Qty: 14 TABLET | Refills: 0 | Status: SHIPPED | OUTPATIENT
Start: 2024-05-08

## 2024-05-08 NOTE — LETTER
May 8, 2024         Patient: Amelia Love   YOB: 1985   Date of Visit: 5/8/2024           To Whom it May Concern:    Please excuse Amelia Love from work on on 5/6/2024 - 5/10/2024. She may return to work on 5/13/2024.    If you have any questions or concerns, please don't hesitate to call.        Sincerely,           SIMBA Carbone.

## 2024-05-08 NOTE — PROGRESS NOTES
"Subjective:     CC:   Chief Complaint   Patient presents with    Sore Throat     Headache , fatigue , congestion , cough ,chills x 5 days      HPI:   Amelia presents today with the following:    URI  Onset about 4 days ago. Reports fatigue, chills, congestion, sore throat, cough, headache, sinus pain/pressure, shortness of breath, rhinorrhea.  Denies fever, ear pain, N/V/D/C, chest tightness.  Home covid test was negative, but they were .   No known sick contacts, although she was around a lot of people last week.   Has been taking OTC cold medication without relief.     ROS:   As documented in history of present illness above    Objective:     Exam: /64 (BP Location: Left arm, Patient Position: Sitting, BP Cuff Size: Adult)   Pulse 94   Temp 36.8 °C (98.3 °F)   Resp 14   Ht 1.803 m (5' 11\")   Wt 77.5 kg (170 lb 12.8 oz)   SpO2 96%  Body mass index is 23.82 kg/m².    Constitutional: Alert, no distress, plus 3 vital signs  Skin:  Warm, dry, no rashes invisible areas  Eye: Equal, round and reactive, conjunctiva clear  ENMT: Bilateral tympanic membranes are retracted with erythema, no perforation. Positive bilateral maxillary sinus tenderness. Oropharynx is slightly injected without exudate. No tonsillar enlargement.    Neck: Mild anterior cervical, nontender lymphadenopathy  Respiratory: Unlabored respiration, lungs clear to auscultation, no wheezes, no rhonchi  Cardiovascular: Normal rate and rhythm  Psych: Alert, pleasant, well-groomed, normal affect    Assessment & Plan:     38 y.o. female with the following -     1. Upper respiratory tract infection, unspecified type  Symptomatic care.  -Oral hydration and rest.   -Cough control: nonpharmacologic options for cough relief such as throat lozenges, hot tea, honey.  -Over the counter expectorant as directed; Guaifenesin (Mucinex).  -Tylenol or ibuprofen for pain and fever as directed.   -Warm salt water gargles.  -OTC Throat lozenges or spray " (Cepacol).  - POCT CEPHEID GROUP A STREP - PCR  - POCT CoV-2, Flu A/B, RSV by PCR  - amoxicillin-clavulanate (AUGMENTIN) 875-125 MG Tab; Take 1 Tablet by mouth 2 times a day.  Dispense: 14 Tablet; Refill: 0    2. Bilateral otitis media, unspecified otitis media type  Patient to take medication as prescribed. Motrin or Tylenol for pain. Return to the office if there is no improvement in symptoms, there are persistent fevers that are not controlled wit Motrin or Tylenol, or increasing pain. I- amoxicillin-clavulanate (AUGMENTIN) 875-125 MG Tab; Take 1 Tablet by mouth 2 times a day.  Dispense: 14 Tablet; Refill: 0

## 2024-05-09 LAB
FLUAV RNA SPEC QL NAA+PROBE: NEGATIVE
FLUBV RNA SPEC QL NAA+PROBE: NEGATIVE
RSV RNA SPEC QL NAA+PROBE: NEGATIVE
S PYO DNA SPEC NAA+PROBE: NOT DETECTED
SARS-COV-2 RNA RESP QL NAA+PROBE: NEGATIVE

## 2024-06-25 ENCOUNTER — DOCUMENTATION (OUTPATIENT)
Dept: NEUROLOGY | Facility: MEDICAL CENTER | Age: 39
End: 2024-06-25
Payer: COMMERCIAL

## 2024-06-25 ENCOUNTER — APPOINTMENT (OUTPATIENT)
Dept: NEUROLOGY | Facility: MEDICAL CENTER | Age: 39
End: 2024-06-25
Attending: PSYCHIATRY & NEUROLOGY
Payer: COMMERCIAL

## 2024-08-06 DIAGNOSIS — G62.9 NEUROPATHY: ICD-10-CM

## 2024-08-06 DIAGNOSIS — G90.522 COMPLEX REGIONAL PAIN SYNDROME I OF LEFT LOWER LIMB: ICD-10-CM

## 2024-08-06 NOTE — TELEPHONE ENCOUNTER
Received request via: Pharmacy    Medication Name/Dosage GABAPENTIN CAP 300MG(N)    When was medication last prescribed 08/24/2023     How many refills were previously provided 3    How many Refills does he patient have left from last prescription 0    Was the patient seen in the last year in this department? Yes   Date of last office visit 11/15/2023      Per last Neurology Office Visit, when was the date of next follow up visit set for?                            Date of office visit follow up request 3/15/2024     Does the patient have an upcoming appointment? Yes   If yes, when 8/16/2024             If no, schedule appointment 0    Does the patient have nursing home Plus and need 100 day supply (blood pressure, diabetes and cholesterol meds only)? Patient does not have SCP

## 2024-08-08 RX ORDER — GABAPENTIN 300 MG/1
300 CAPSULE ORAL 3 TIMES DAILY
Qty: 270 CAPSULE | Refills: 3 | Status: SHIPPED | OUTPATIENT
Start: 2024-08-08 | End: 2025-08-03

## 2024-08-16 ENCOUNTER — OFFICE VISIT (OUTPATIENT)
Dept: NEUROLOGY | Facility: MEDICAL CENTER | Age: 39
End: 2024-08-16
Attending: STUDENT IN AN ORGANIZED HEALTH CARE EDUCATION/TRAINING PROGRAM
Payer: COMMERCIAL

## 2024-08-16 VITALS
HEIGHT: 71 IN | OXYGEN SATURATION: 97 % | WEIGHT: 172.84 LBS | BODY MASS INDEX: 24.2 KG/M2 | SYSTOLIC BLOOD PRESSURE: 124 MMHG | TEMPERATURE: 98.8 F | DIASTOLIC BLOOD PRESSURE: 78 MMHG | HEART RATE: 75 BPM

## 2024-08-16 DIAGNOSIS — G43.009 MIGRAINE WITHOUT AURA AND WITHOUT STATUS MIGRAINOSUS, NOT INTRACTABLE: ICD-10-CM

## 2024-08-16 DIAGNOSIS — G90.522 COMPLEX REGIONAL PAIN SYNDROME I OF LEFT LOWER LIMB: ICD-10-CM

## 2024-08-16 PROCEDURE — 99211 OFF/OP EST MAY X REQ PHY/QHP: CPT | Performed by: STUDENT IN AN ORGANIZED HEALTH CARE EDUCATION/TRAINING PROGRAM

## 2024-08-16 PROCEDURE — 99212 OFFICE O/P EST SF 10 MIN: CPT | Performed by: STUDENT IN AN ORGANIZED HEALTH CARE EDUCATION/TRAINING PROGRAM

## 2024-08-16 PROCEDURE — 3074F SYST BP LT 130 MM HG: CPT | Performed by: STUDENT IN AN ORGANIZED HEALTH CARE EDUCATION/TRAINING PROGRAM

## 2024-08-16 PROCEDURE — 3078F DIAST BP <80 MM HG: CPT | Performed by: STUDENT IN AN ORGANIZED HEALTH CARE EDUCATION/TRAINING PROGRAM

## 2024-08-16 ASSESSMENT — PATIENT HEALTH QUESTIONNAIRE - PHQ9: CLINICAL INTERPRETATION OF PHQ2 SCORE: 0

## 2024-08-16 NOTE — PATIENT INSTRUCTIONS
NEUROLOGY CLINIC VISIT WITH DR. MCGRATH     PLEASE READ THIS ENTIRE DOCUMENT CAREFULLY AND COMPLETELY:    First and foremost, you matter to Dr. Mcgrath and you deserve the best care.   Dr. Mcgrath prides himself on providing the best possible care to all his patients. He strives to make each appointment meaningful, so that all your concerns are being addressed and all your neurological problems are being optimally treated. In order to achieve these goals for everyone, Dr. Mcgrath has listed important reminders and the best ways to prepare for each appointment. Please read each item carefully. Thank you!    Due to the high volume of patients we are trying to help, your physician will not be able to respond by phone or in [employer] to your routine concerns between appointments.  This does not reflect a lack of interest or concern for you or your diagnosis.  Please bring these questions and concerns to your appointment where your physician can answer.  Please relay more pressing concerns to our office, either via [employer], or by phone; if not able to reach us please visit nearby Urgent Care Center or Emergency Department.  If any emergent medical needs, please seek emergent medical help and/or call 911.    Also, please note that we are not able to fill out paperwork that might be related to your work, utility company, disability, and/or driving, among others, in between the visits.  Please schedule a dedicated appointment to address any and all paperwork.  This is not due to lack of concern or interest for your disease-related work/administrative problems, but to make sure that we provide the best possible care and to fill out your paperwork in a correct, complete, and timely manner.  ------------------------------------------------------------------------------------------  Please let our office know if you have any changes in your seizure frequency and/characteristics.     Please keep a diary of your seizures and bring it  with you to each appointment.    Please take vitamin D3 2817-2345 internation units daily.     Please abstain from driving until further notice    If you are a biological female with epilepsy who is of reproductive age, who is actively breastfeeding, and/or who infants/young children:  Please take folic acid 1 mg daily. This is an over-the-counter supplement that is recommended to prevent certain developmental problems in your baby, in case you become pregnant in the future.  It is critical that you let our office know as soon as you become pregnant or plan to become pregnant.  If you are caring for a baby/young child, please make sure to be sitting on a soft surface while holding your baby/young child, so in case you have a seizure, your baby/young child is not injured due to fall.   Please let us know if, while breastfeeding, you observe that your baby is excessively sleepy and/or has other behavioral changes. Because many antiseizure medications are collected in breast milk, some nursing babies can suffer adverse medication effects.    Please note that the following might precipitate seizures:   missed doses of antiseizure medications  being sick with a fever, stress  Fatigue  sleep deprivation or abnormal sleeping patterns  not eating regularly  not drinking enough water  drinking too much alcohol  stopping alcohol suddenly if you are currently using it on a regular/daily basis,   using recreational drugs, among others.    Please note that the following might lead to an injury or even be life-threatening in the event you have a seizure and/or lose awareness while:  being in a large body of water by yourself, such as bath, pool, lake, ocean, among others (risk of drowning)  being on unprotected heights (risk of fall)  being around and/or operating heavy machinery (risk of injury)  being around open fire/hot surfaces (risk of burns)  any other activities/circumstances, in which if you lose awareness, you might  injure yourself and/or others.  -------------------------------------------------------------------------------------------  SUDEP (SUDDEN UNEXPECTED DEATH IN EPILEPSY)  It is important that your seizures are well controlled and you have none or have them rarely. In addition to avoiding injury related to breakthrough seizures, frequent seizures increase risk of SUDEP (sudden unexpected death in epilepsy), where a person goes into a seizure and then never wakes up. The best way to prevent SUDEP is to control your seizures well.   ------------------------------------------------------------------------------------------  Please call for help (crisis line and/or 911) in case you have thoughts of harming yourself and/or others.  ------------------------------------------------------------------------------------------  INSTRUCTIONS FOR YOUR FAMILY/CAREGIVERS:  Please call 911 if the patient has a seizure longer than 2-3 minutes, if seizures are back to back without her recovering to her baseline, or she does not start recovering within 5-10 minutes after the seizure stops. During the seizure - please turn her on her side, please make sure her head is protected (for example, you should put a pillow under her head, if one is available), and please do not put anything in her mouth.   ------------------------------------------------------------------------------------------  PATIENT EXPECTATIONS,  IMPORTANT APPOINTMENT REMINDERS, AND ADDITIONAL HELPFUL TIPS:   REFILLS:   Request refills AT LEAST 1 week in advance to ensure you do not run out of medications    MyChart  It is STRONGLY encouraged that ALL patients sign up for MyChart. It is BY FAR the fastest and most convenient way for both Dr. Mcgrath and patients to obtain timely refills.  If you are having trouble signing up or logging into your account, staff are available to help you. Please ask a medical assistant or staff at the  to assist you.    TEST RESULS:    All labs and diagnostic test results will be reviewed at your next visit, UNLESS  Dr. Mcgrath determines that there are important findings on the tests need to be acted on sooner. Dr. Mcgrath will either call or send a message through [employer] if this is the case.    BE PREPARED PRIOR TO EVERY APPOINTMENT:  All patient are responsible for ensuring that ALL test results that were completed outside of the [facility] system have been received by our Neurology Department PRIOR to your appointment with Dr. Mcgrath.    IMPORTANT:  ALL images (not just the reports) must be sent and uploaded to the [facility] system. Dr. Mcgrath reviews all images personally prior to each visit. Ensuring that ALL the test results and test images are accessible to Dr. Mcgrath prior to your appointment is YOUR responsibility and an important part of making the most out of each appointment.   Bring a government-issues picture ID and an updated insurance card EVERY visit.  It is highly recommended that you bring at every visit a list of the most important topics that you want address. While it may not be possible to address all items on the list in a single visit, preparing a list will ensure that Dr. Mcgrath addresses the items that are most important to you and your health    PAPERWORK, DOCUMENTATION, LETTER REQUESTS:  You must notify the office ahead of your appointment of all paperwork or letter requests.   Please DO NOT wait until the last minute to make these requests. Please give all paperwork to the medical assistant at the start of the appointment and check-in process. Please note that Dr. Mcgrath may not be able complete some types of documentation in a single appointment or even within a single day or week. This is why it is important to communicate paperwork requests prior to your appointment and at least 2 weeks prior to any deadlines.    KNOW ALL YOU MEDICATIONS:   AT EVERY SINGLE APPOINTMENT, please bring a list of every single prescribed,  non-prescribed, and over the counter medication or supplements you are taking, including ones taken on a rare or intermittent basis.  Include the following information for each prescribed or non-prescribed medications:  Name of medication   The strength of EACH pill/capsule/tablet, etc.   The number of pills/capsules/tablets, etc taken per dose  The number and time of day that doses are taken  For every single Supplement that you take on a routine or intermittent basis, you must include:  The Brand Name   A complete list of every single ingredient, compound, vitamin, and/or mineral in each dose, along with the corresponding amounts/strengths of all ingredients, vitamins, minerals, etc., if such information is provided or known  The number of doses taken per day and time of day doses are taken  If medications are taken on an intermittent or as needed basis, please estimate how many days per week or days per month the medications are used  DO NOT just print out your medication list from [facility] or bring a list from a prior appointment or hospitalizations because the information is often often unreliable, inaccurate, outdated, and/or incomplete   The list should be printed or written  If you forget or do not have a list of all the medication, then it is acceptable, although less preferred, to bring all the bottles to the appointment     ARRIVE EARLY FOR ALL VISITS:  Please note that we are unable to accommodate late arrivals as per office policy.  YOU-the patient - (NOT a parent, spouse, or friend) must be physically present at check-in no later than 12 minutes after the scheduled appointment time, or you will be asked to reschedule   Consider scheduling a virtual appointment with Dr. Mcgrath through [employer] as an alternative if transportation to the clinic is difficult or unavailable   Please note, however, that virtual visits can only be scheduled after being an established patient of Dr. Mcgrath. All new appointments  "must be done in-person in clinic  Some insurances will not cover the cost of virtual appointments. Please check with your insurance to find out if these visits are covered    COMMUNICATING URGENT AND NON-URGENT MATTERS:  Your concerns are important and deserve to be heard and addressed. If you have an urgent matter, there are two methods that will ensure your concerns are prioritized appropriately:   Preferred method: Sign-up/Login to your [employer] account and send a message addressed to Dr. Mcgrath or Trista Magaña (Dr. Mcgrath's assistant). In the subject line, type \"urgent\" followed by a word or phrase describing the situation (For example, write \"Urgent: Out of antiseuzre med and need refill\" or \"Urgent: Severe side effects to new meds\". In doing this, our staff can ensure urgent messages are triaged appropriately and communicated to Dr. Mcgrath that day.  Call [facility] main line at [phone]. Dr. Mcgrath's voicemail extension is [phone]. When leaving a voice message, specifically indicate if it is urgent (or non-urgent) so that the matter can be triaged appropriately and addressed in a timely manner    Thank you for entrusting your neurological care to [facility] and we look forward to continuing to serve you.   "

## 2024-08-16 NOTE — PROGRESS NOTES
"NEUROLOGY FOLLOW-UP - 08/16/2024     REASON FOR VISIT: Amelia Love 39 y.o. female presents today for follow-up       SUMMARY RELEVANT PAST MEDICAL HISTORY AND/OR COMPENDIUM OF RELEVANT WORK-UP AND TREATMENTS TO DATE:      INTERVAL HISTORY:  complex regional pain syndrome of the left foot. She continues to do very well. She continues gabapentin 300 mg TID although sometimes she will take 300 mg BID and does fine at that dose.     She dose notice gabapentin is still needed on days when she has not taken the gabapentin. Pain comes back as does the swelling.    She noticed that alcohol seemed to cause a flair up when she went out for a social drink with a friend and noticed pain and swelling afterwards.    CURRENT MEDICATIONS AT THE TIME OF THIS ENCOUNTER:  Current Outpatient Medications on File Prior to Visit   Medication Sig Dispense Refill    gabapentin (NEURONTIN) 300 MG Cap Take 1 Capsule by mouth 3 times a day for 360 days. Take  one 300 mg capsule + one 100 mg capsule three times per day 270 Capsule 3    levonorgestrel-ethinyl estradiol (SEASONALE) 0.15-0.03 MG per tablet Take 1 Tablet by mouth every day. 91 Tablet 3    amoxicillin-clavulanate (AUGMENTIN) 875-125 MG Tab Take 1 Tablet by mouth 2 times a day. 14 Tablet 0    VITAMIN D PO Take  by mouth. (Patient not taking: Reported on 8/16/2024)       No current facility-administered medications on file prior to visit.          EXAM:   /78 (BP Location: Right arm, Patient Position: Sitting, BP Cuff Size: Adult)   Pulse 75   Temp 37.1 °C (98.8 °F) (Temporal)   Ht 1.803 m (5' 11\")   Wt 78.4 kg (172 lb 13.5 oz)   SpO2 97%    Wt Readings from Last 5 Encounters:   08/16/24 78.4 kg (172 lb 13.5 oz)   05/08/24 77.5 kg (170 lb 12.8 oz)   04/01/24 75.5 kg (166 lb 6.4 oz)   11/15/23 75.7 kg (166 lb 14.2 oz)   10/12/23 73.7 kg (162 lb 6.4 oz)      Physical Exam:  Physical Exam  Constitutional:       General: She is not in acute distress.     Appearance: " She is not ill-appearing.   HENT:      Head: Normocephalic and atraumatic.      Nose: Nose normal. No congestion or rhinorrhea.      Mouth/Throat:      Mouth: Mucous membranes are moist.      Pharynx: No oropharyngeal exudate or posterior oropharyngeal erythema.   Cardiovascular:      Rate and Rhythm: Normal rate and regular rhythm.      Pulses: Normal pulses.      Heart sounds: Normal heart sounds.   Pulmonary:      Effort: Pulmonary effort is normal.      Breath sounds: Normal breath sounds. No wheezing.   Musculoskeletal:         General: No swelling or tenderness. Normal range of motion.      Cervical back: Normal range of motion and neck supple. No rigidity.   Skin:     General: Skin is warm and dry.      Findings: Erythema (Very mild erythematous swelling of the left lower extremity) present.   Neurological:      Mental Status: She is alert.      Motor: Motor strength is normal.       Neurological Exam   Neurological Exam  Mental Status  Alert.    Motor  Normal muscle bulk throughout. No fasciculations present. Strength is 5/5 throughout all four extremities.    Sensory  Sensation of the lower extremity intact with out allodynia or dysesthesias.         ASSESSMENT, EDUCATION, AND COUNSELING:  This is a 39 y.o. female patient who presents to the neurology clinic. We had an extensive discussion about the patient's symptoms, signs, and work-up to date, if any. We discussed potential and/or definitive diagnoses, work-up, and potential treatments.     PLAN:  If applicable, the work-up such as labs, imaging, procedures, and/or other testing, referrals, and/or recommended treatment strategies are listed below.    Medications were administered today in clinic (if any):     Visit Diagnoses     ICD-10-CM   1. Complex regional pain syndrome i of left lower limb  G90.522   2. Migraine without aura and without status migrainosus, not intractable  G43.009      No orders of the defined types were placed in this encounter.        complex regional pain syndrome of the left foot controlled on gabapentin. Continue to take gabapentin 300 mg 2-3 times per day. Follow-up as needed as she is clinically stable.        BILLING DOCUMENTATION:     The number of minutes of face-to-face time spent in this encounter was I spent a total of 15 minutes on the day of the visit.  . Over 50% of the time of the visit today was spent on counseling and/or coordination of care wtih the patient and/or family, as outlined above in assessment in plan.    Abraham Mcgrath MD  Department of Neurology at [facility]  Diplomate of the American Board of Psychiatry and Neurology, General Neurology  Diplomate of American Board of Psychiatry and Neurology, a Member Board of the American Board of Medical Subspecialties, Epilepsy  Director of [facility]'s Level III Comprehensive Epilepsy Program  Professor of Clinical Neurology, [facility].   [address], [address]  [facility] [address] [phone]   Fax: [phone]  E-mail: [email or web address]

## 2024-10-03 LAB
25(OH)D3+25(OH)D2 SERPL-MCNC: 34.9 NG/ML (ref 30–100)
ALBUMIN SERPL-MCNC: 4.3 G/DL (ref 3.9–4.9)
ALP SERPL-CCNC: 53 IU/L (ref 44–121)
ALT SERPL-CCNC: 16 IU/L (ref 0–32)
AST SERPL-CCNC: 13 IU/L (ref 0–40)
BASOPHILS # BLD AUTO: 0 X10E3/UL (ref 0–0.2)
BASOPHILS NFR BLD AUTO: 1 %
BILIRUB SERPL-MCNC: 0.4 MG/DL (ref 0–1.2)
BUN SERPL-MCNC: 9 MG/DL (ref 6–20)
BUN/CREAT SERPL: 10 (ref 9–23)
CALCIUM SERPL-MCNC: 9.1 MG/DL (ref 8.7–10.2)
CHLORIDE SERPL-SCNC: 105 MMOL/L (ref 96–106)
CO2 SERPL-SCNC: 19 MMOL/L (ref 20–29)
CREAT SERPL-MCNC: 0.9 MG/DL (ref 0.57–1)
EGFRCR SERPLBLD CKD-EPI 2021: 83 ML/MIN/1.73
EOSINOPHIL # BLD AUTO: 0.1 X10E3/UL (ref 0–0.4)
EOSINOPHIL NFR BLD AUTO: 2 %
ERYTHROCYTE [DISTWIDTH] IN BLOOD BY AUTOMATED COUNT: 11.7 % (ref 11.7–15.4)
GLOBULIN SER CALC-MCNC: 2.1 G/DL (ref 1.5–4.5)
GLUCOSE SERPL-MCNC: 89 MG/DL (ref 70–99)
HBA1C MFR BLD: 5.4 % (ref 4.8–5.6)
HCT VFR BLD AUTO: 43.6 % (ref 34–46.6)
HGB BLD-MCNC: 14.5 G/DL (ref 11.1–15.9)
IMM GRANULOCYTES # BLD AUTO: 0 X10E3/UL (ref 0–0.1)
IMM GRANULOCYTES NFR BLD AUTO: 0 %
IMMATURE CELLS  115398: NORMAL
LYMPHOCYTES # BLD AUTO: 1.6 X10E3/UL (ref 0.7–3.1)
LYMPHOCYTES NFR BLD AUTO: 28 %
MCH RBC QN AUTO: 30.1 PG (ref 26.6–33)
MCHC RBC AUTO-ENTMCNC: 33.3 G/DL (ref 31.5–35.7)
MCV RBC AUTO: 91 FL (ref 79–97)
MONOCYTES # BLD AUTO: 0.3 X10E3/UL (ref 0.1–0.9)
MONOCYTES NFR BLD AUTO: 6 %
MORPHOLOGY BLD-IMP: NORMAL
NEUTROPHILS # BLD AUTO: 3.4 X10E3/UL (ref 1.4–7)
NEUTROPHILS NFR BLD AUTO: 63 %
NRBC BLD AUTO-RTO: NORMAL %
PLATELET # BLD AUTO: 222 X10E3/UL (ref 150–450)
POTASSIUM SERPL-SCNC: 4.4 MMOL/L (ref 3.5–5.2)
PROT SERPL-MCNC: 6.4 G/DL (ref 6–8.5)
RBC # BLD AUTO: 4.82 X10E6/UL (ref 3.77–5.28)
SODIUM SERPL-SCNC: 139 MMOL/L (ref 134–144)
T4 FREE SERPL-MCNC: 1.41 NG/DL (ref 0.82–1.77)
TSH SERPL DL<=0.005 MIU/L-ACNC: 2.14 UIU/ML (ref 0.45–4.5)
WBC # BLD AUTO: 5.5 X10E3/UL (ref 3.4–10.8)

## 2024-10-22 SDOH — HEALTH STABILITY: PHYSICAL HEALTH: ON AVERAGE, HOW MANY DAYS PER WEEK DO YOU ENGAGE IN MODERATE TO STRENUOUS EXERCISE (LIKE A BRISK WALK)?: 4 DAYS

## 2024-10-22 SDOH — ECONOMIC STABILITY: FOOD INSECURITY: WITHIN THE PAST 12 MONTHS, THE FOOD YOU BOUGHT JUST DIDN'T LAST AND YOU DIDN'T HAVE MONEY TO GET MORE.: NEVER TRUE

## 2024-10-22 SDOH — ECONOMIC STABILITY: INCOME INSECURITY: IN THE LAST 12 MONTHS, WAS THERE A TIME WHEN YOU WERE NOT ABLE TO PAY THE MORTGAGE OR RENT ON TIME?: NO

## 2024-10-22 SDOH — ECONOMIC STABILITY: INCOME INSECURITY: HOW HARD IS IT FOR YOU TO PAY FOR THE VERY BASICS LIKE FOOD, HOUSING, MEDICAL CARE, AND HEATING?: NOT HARD AT ALL

## 2024-10-22 SDOH — ECONOMIC STABILITY: FOOD INSECURITY: WITHIN THE PAST 12 MONTHS, YOU WORRIED THAT YOUR FOOD WOULD RUN OUT BEFORE YOU GOT MONEY TO BUY MORE.: NEVER TRUE

## 2024-10-22 SDOH — HEALTH STABILITY: PHYSICAL HEALTH: ON AVERAGE, HOW MANY MINUTES DO YOU ENGAGE IN EXERCISE AT THIS LEVEL?: 20 MIN

## 2024-10-22 ASSESSMENT — SOCIAL DETERMINANTS OF HEALTH (SDOH)
DO YOU BELONG TO ANY CLUBS OR ORGANIZATIONS SUCH AS CHURCH GROUPS UNIONS, FRATERNAL OR ATHLETIC GROUPS, OR SCHOOL GROUPS?: NO
HOW OFTEN DO YOU ATTEND CHURCH OR RELIGIOUS SERVICES?: PATIENT DECLINED
HOW HARD IS IT FOR YOU TO PAY FOR THE VERY BASICS LIKE FOOD, HOUSING, MEDICAL CARE, AND HEATING?: NOT HARD AT ALL
HOW OFTEN DO YOU GET TOGETHER WITH FRIENDS OR RELATIVES?: ONCE A WEEK
IN A TYPICAL WEEK, HOW MANY TIMES DO YOU TALK ON THE PHONE WITH FAMILY, FRIENDS, OR NEIGHBORS?: ONCE A WEEK
WITHIN THE PAST 12 MONTHS, YOU WORRIED THAT YOUR FOOD WOULD RUN OUT BEFORE YOU GOT THE MONEY TO BUY MORE: NEVER TRUE
HOW OFTEN DO YOU GET TOGETHER WITH FRIENDS OR RELATIVES?: ONCE A WEEK
IN THE PAST 12 MONTHS, HAS THE ELECTRIC, GAS, OIL, OR WATER COMPANY THREATENED TO SHUT OFF SERVICE IN YOUR HOME?: NO
HOW MANY DRINKS CONTAINING ALCOHOL DO YOU HAVE ON A TYPICAL DAY WHEN YOU ARE DRINKING: 1 OR 2
IN A TYPICAL WEEK, HOW MANY TIMES DO YOU TALK ON THE PHONE WITH FAMILY, FRIENDS, OR NEIGHBORS?: ONCE A WEEK
DO YOU BELONG TO ANY CLUBS OR ORGANIZATIONS SUCH AS CHURCH GROUPS UNIONS, FRATERNAL OR ATHLETIC GROUPS, OR SCHOOL GROUPS?: NO
HOW OFTEN DO YOU ATTENT MEETINGS OF THE CLUB OR ORGANIZATION YOU BELONG TO?: PATIENT DECLINED
HOW OFTEN DO YOU ATTEND CHURCH OR RELIGIOUS SERVICES?: PATIENT DECLINED
HOW OFTEN DO YOU ATTENT MEETINGS OF THE CLUB OR ORGANIZATION YOU BELONG TO?: PATIENT DECLINED
HOW OFTEN DO YOU HAVE A DRINK CONTAINING ALCOHOL: MONTHLY OR LESS
HOW OFTEN DO YOU HAVE SIX OR MORE DRINKS ON ONE OCCASION: NEVER

## 2024-10-22 ASSESSMENT — LIFESTYLE VARIABLES
HOW OFTEN DO YOU HAVE A DRINK CONTAINING ALCOHOL: MONTHLY OR LESS
HOW MANY STANDARD DRINKS CONTAINING ALCOHOL DO YOU HAVE ON A TYPICAL DAY: 1 OR 2
SKIP TO QUESTIONS 9-10: 1
HOW OFTEN DO YOU HAVE SIX OR MORE DRINKS ON ONE OCCASION: NEVER
AUDIT-C TOTAL SCORE: 1

## 2024-10-24 ENCOUNTER — APPOINTMENT (OUTPATIENT)
Dept: MEDICAL GROUP | Facility: LAB | Age: 39
End: 2024-10-24
Payer: COMMERCIAL

## 2024-10-24 ENCOUNTER — HOSPITAL ENCOUNTER (OUTPATIENT)
Facility: MEDICAL CENTER | Age: 39
End: 2024-10-24
Attending: NURSE PRACTITIONER
Payer: COMMERCIAL

## 2024-10-24 VITALS
SYSTOLIC BLOOD PRESSURE: 114 MMHG | RESPIRATION RATE: 14 BRPM | HEART RATE: 94 BPM | BODY MASS INDEX: 24.67 KG/M2 | DIASTOLIC BLOOD PRESSURE: 66 MMHG | WEIGHT: 176.2 LBS | OXYGEN SATURATION: 100 % | TEMPERATURE: 98 F | HEIGHT: 71 IN

## 2024-10-24 DIAGNOSIS — Z12.4 SCREENING FOR CERVICAL CANCER: ICD-10-CM

## 2024-10-24 DIAGNOSIS — Z01.419 WELL WOMAN EXAM: ICD-10-CM

## 2024-10-24 PROCEDURE — 3078F DIAST BP <80 MM HG: CPT | Performed by: NURSE PRACTITIONER

## 2024-10-24 PROCEDURE — 88142 CYTOPATH C/V THIN LAYER: CPT

## 2024-10-24 PROCEDURE — 3074F SYST BP LT 130 MM HG: CPT | Performed by: NURSE PRACTITIONER

## 2024-10-24 PROCEDURE — 99395 PREV VISIT EST AGE 18-39: CPT | Performed by: NURSE PRACTITIONER

## 2024-10-24 ASSESSMENT — FIBROSIS 4 INDEX: FIB4 SCORE: 0.57

## 2024-10-25 DIAGNOSIS — Z12.4 SCREENING FOR CERVICAL CANCER: ICD-10-CM

## 2024-11-01 LAB
HPV I/H RISK 1 DNA SPEC QL NAA+PROBE: NOT DETECTED
SPECIMEN SOURCE: NORMAL
THINPREP PAP, CYTOLOGY NL11781: NORMAL

## 2024-11-05 ENCOUNTER — APPOINTMENT (RX ONLY)
Dept: URBAN - METROPOLITAN AREA CLINIC 35 | Facility: CLINIC | Age: 39
Setting detail: DERMATOLOGY
End: 2024-11-05

## 2024-11-05 DIAGNOSIS — D485 NEOPLASM OF UNCERTAIN BEHAVIOR OF SKIN: ICD-10-CM

## 2024-11-05 PROBLEM — D48.5 NEOPLASM OF UNCERTAIN BEHAVIOR OF SKIN: Status: ACTIVE | Noted: 2024-11-05

## 2024-11-05 PROCEDURE — 11311 SHAVE SKIN LESION 0.6-1.0 CM: CPT

## 2024-11-05 PROCEDURE — ? SHAVE REMOVAL

## 2024-11-05 ASSESSMENT — LOCATION DETAILED DESCRIPTION DERM: LOCATION DETAILED: LEFT CENTRAL TEMPLE

## 2024-11-05 ASSESSMENT — LOCATION SIMPLE DESCRIPTION DERM: LOCATION SIMPLE: LEFT TEMPLE

## 2024-11-05 ASSESSMENT — LOCATION ZONE DERM: LOCATION ZONE: FACE

## 2024-11-05 NOTE — HPI: SKIN LESION
Is This A New Presentation, Or A Follow-Up?: Skin Lesion
What Type Of Note Output Would You Prefer (Optional)?: Standard Output
How Severe Is Your Skin Lesion?: mild
Has Your Skin Lesion Been Treated?: not been treated
Additional History: Patient states flesh colored wart like papule developed rather quickly.

## 2024-11-05 NOTE — PROCEDURE: SHAVE REMOVAL
Medical Necessity Information: It is in your best interest to select a reason for this procedure from the list below. All of these items fulfill various CMS LCD requirements except the new and changing color options.
Medical Necessity Clause: This procedure was medically necessary because the lesion that was treated was:
Lab: [id number]
Lab Facility: 
Detail Level: Detailed
Was A Bandage Applied: Yes
Size Of Lesion In Cm (Required): 1
X Size Of Lesion In Cm (Optional): 0
Depth Of Shave: dermis
Biopsy Method: Personna blade
Anesthesia Type: 1% lidocaine with epinephrine
Hemostasis: Drysol
Wound Care: Petrolatum
Render Path Notes In Note?: No
Consent was obtained from the patient. The risks and benefits to therapy were discussed in detail. Specifically, the risks of infection, scarring, bleeding, prolonged wound healing, incomplete removal, allergy to anesthesia, nerve injury and recurrence were addressed. Prior to the procedure, the treatment site was clearly identified and confirmed by the patient. All components of Universal Protocol/PAUSE Rule completed.
Post-Care Instructions: I reviewed with the patient in detail post-care instructions. Patient is to keep the biopsy site dry overnight, and then apply bacitracin twice daily until healed. Patient may apply hydrogen peroxide soaks to remove any crusting.
Notification Instructions: Patient will be notified of pathology results. However, patient instructed to call the office if not contacted within 2 weeks.
Billing Type: Third-Party Bill

## 2024-11-28 DIAGNOSIS — Z30.41 ENCOUNTER FOR BIRTH CONTROL PILLS MAINTENANCE: ICD-10-CM

## 2024-11-30 NOTE — TELEPHONE ENCOUNTER
Received request via: Pharmacy    Was the patient seen in the last year in this department? Yes  LOV : 10/24/2024   Does the patient have an active prescription (recently filled or refills available) for medication(s) requested? No    Pharmacy Name: [facility]    Does the patient have [facility] Plus and need 100-day supply? (This applies to ALL medications) Patient does not have SCP

## 2024-12-02 RX ORDER — LEVONORGESTREL AND ETHINYL ESTRADIOL 0.15-0.03
KIT ORAL
Qty: 91 TABLET | Refills: 3 | Status: SHIPPED | OUTPATIENT
Start: 2024-12-02

## 2025-01-16 ENCOUNTER — APPOINTMENT (OUTPATIENT)
Dept: URBAN - METROPOLITAN AREA CLINIC 35 | Facility: CLINIC | Age: 40
Setting detail: DERMATOLOGY
End: 2025-01-16

## 2025-01-16 DIAGNOSIS — D22 MELANOCYTIC NEVI: ICD-10-CM | Status: STABLE

## 2025-01-16 PROBLEM — D22.4 MELANOCYTIC NEVI OF SCALP AND NECK: Status: ACTIVE | Noted: 2025-01-16

## 2025-01-16 PROCEDURE — 99212 OFFICE O/P EST SF 10 MIN: CPT

## 2025-01-16 PROCEDURE — ? COUNSELING

## 2025-01-16 ASSESSMENT — LOCATION DETAILED DESCRIPTION DERM: LOCATION DETAILED: LEFT SUPERIOR PARIETAL SCALP

## 2025-01-16 ASSESSMENT — LOCATION SIMPLE DESCRIPTION DERM: LOCATION SIMPLE: SCALP

## 2025-01-16 ASSESSMENT — LOCATION ZONE DERM: LOCATION ZONE: SCALP

## 2025-03-06 ENCOUNTER — APPOINTMENT (OUTPATIENT)
Dept: URBAN - METROPOLITAN AREA CLINIC 35 | Facility: CLINIC | Age: 40
Setting detail: DERMATOLOGY
End: 2025-03-06

## 2025-03-06 DIAGNOSIS — D18.0 HEMANGIOMA: ICD-10-CM

## 2025-03-06 DIAGNOSIS — L82.0 INFLAMED SEBORRHEIC KERATOSIS: ICD-10-CM

## 2025-03-06 DIAGNOSIS — D22 MELANOCYTIC NEVI: ICD-10-CM | Status: UNCHANGED

## 2025-03-06 DIAGNOSIS — L82.1 OTHER SEBORRHEIC KERATOSIS: ICD-10-CM

## 2025-03-06 DIAGNOSIS — L56.5 DISSEMINATED SUPERFICIAL ACTINIC POROKERATOSIS (DSAP): ICD-10-CM

## 2025-03-06 DIAGNOSIS — Z71.89 OTHER SPECIFIED COUNSELING: ICD-10-CM

## 2025-03-06 DIAGNOSIS — L81.4 OTHER MELANIN HYPERPIGMENTATION: ICD-10-CM

## 2025-03-06 PROBLEM — D22.5 MELANOCYTIC NEVI OF TRUNK: Status: ACTIVE | Noted: 2025-03-06

## 2025-03-06 PROBLEM — D18.01 HEMANGIOMA OF SKIN AND SUBCUTANEOUS TISSUE: Status: ACTIVE | Noted: 2025-03-06

## 2025-03-06 PROBLEM — D22.72 MELANOCYTIC NEVI OF LEFT LOWER LIMB, INCLUDING HIP: Status: ACTIVE | Noted: 2025-03-06

## 2025-03-06 PROBLEM — D23.62 OTHER BENIGN NEOPLASM OF SKIN OF LEFT UPPER LIMB, INCLUDING SHOULDER: Status: ACTIVE | Noted: 2025-03-06

## 2025-03-06 PROCEDURE — ? COUNSELING

## 2025-03-06 PROCEDURE — ? LIQUID NITROGEN

## 2025-03-06 PROCEDURE — 17110 DESTRUCTION B9 LES UP TO 14: CPT

## 2025-03-06 PROCEDURE — ? OBSERVATION

## 2025-03-06 PROCEDURE — 99213 OFFICE O/P EST LOW 20 MIN: CPT | Mod: 25

## 2025-03-06 PROCEDURE — ? TREATMENT REGIMEN

## 2025-03-06 PROCEDURE — ? PRESCRIPTION

## 2025-03-06 RX ORDER — FLUOROURACIL 0.04 G/G
THIN LAYER CREAM TOPICAL QHS
Qty: 40 | Refills: 1 | Status: ERX | COMMUNITY
Start: 2025-03-06

## 2025-03-06 RX ADMIN — FLUOROURACIL THIN LAYER: 0.04 CREAM TOPICAL at 00:00

## 2025-03-06 ASSESSMENT — LOCATION DETAILED DESCRIPTION DERM
LOCATION DETAILED: RIGHT PROXIMAL DORSAL FOREARM
LOCATION DETAILED: RIGHT LATERAL BREAST 6-7:00 REGION
LOCATION DETAILED: LEFT RIB CAGE
LOCATION DETAILED: LEFT ACHILLES SKIN
LOCATION DETAILED: EPIGASTRIC SKIN
LOCATION DETAILED: LEFT PROXIMAL DORSAL FOREARM
LOCATION DETAILED: PERIUMBILICAL SKIN
LOCATION DETAILED: MID TRAPEZIAL NECK
LOCATION DETAILED: LEFT MEDIAL UPPER BACK
LOCATION DETAILED: RIGHT MEDIAL PROXIMAL PRETIBIAL REGION
LOCATION DETAILED: RIGHT INFERIOR LATERAL FOREHEAD

## 2025-03-06 ASSESSMENT — LOCATION SIMPLE DESCRIPTION DERM
LOCATION SIMPLE: TRAPEZIAL NECK
LOCATION SIMPLE: RIGHT BREAST
LOCATION SIMPLE: LEFT ACHILLES SKIN
LOCATION SIMPLE: LEFT FOREARM
LOCATION SIMPLE: ABDOMEN
LOCATION SIMPLE: RIGHT FOREHEAD
LOCATION SIMPLE: RIGHT PRETIBIAL REGION
LOCATION SIMPLE: RIGHT FOREARM
LOCATION SIMPLE: LEFT UPPER BACK

## 2025-03-06 ASSESSMENT — LOCATION ZONE DERM
LOCATION ZONE: LEG
LOCATION ZONE: FACE
LOCATION ZONE: ARM
LOCATION ZONE: TRUNK
LOCATION ZONE: NECK

## 2025-03-06 NOTE — PROCEDURE: LIQUID NITROGEN
Include Z78.9 (Other Specified Conditions Influencing Health Status) As An Associated Diagnosis?: No
Show Aperture Variable?: Yes
Application Tool (Optional): Cry-AC
Number Of Freeze-Thaw Cycles: 2 freeze-thaw cycles
Medical Necessity Clause: This procedure was medically necessary because the lesions that were treated were:
Duration Of Freeze Thaw-Cycle (Seconds): 10
Detail Level: Detailed
Post-Care Instructions: I reviewed with the patient in detail post-care instructions. Patient is to wear sunprotection, and avoid picking at any of the treated lesions. Pt may apply Vaseline to crusted or scabbing areas.
Spray Paint Text: The liquid nitrogen was applied to the skin utilizing a spray paint frosting technique.
Medical Necessity Information: It is in your best interest to select a reason for this procedure from the list below. All of these items fulfill various CMS LCD requirements except the new and changing color options.
Consent: The patient's consent was obtained including but not limited to risks of crusting, scabbing, blistering, scarring, darker or lighter pigmentary change, recurrence, incomplete removal and infection.

## 2025-04-25 LAB
CHOLEST SERPL-MCNC: 233 MG/DL (ref 100–199)
HDLC SERPL-MCNC: 42 MG/DL
LDL CALC COMMENT:: ABNORMAL
LDLC SERPL CALC-MCNC: 164 MG/DL (ref 0–99)
TRIGL SERPL-MCNC: 150 MG/DL (ref 0–149)
VLDLC SERPL CALC-MCNC: 27 MG/DL (ref 5–40)

## 2025-04-28 ENCOUNTER — RESULTS FOLLOW-UP (OUTPATIENT)
Dept: MEDICAL GROUP | Facility: LAB | Age: 40
End: 2025-04-28
Payer: COMMERCIAL

## 2025-06-09 ENCOUNTER — OFFICE VISIT (OUTPATIENT)
Dept: MEDICAL GROUP | Age: 40
End: 2025-06-09
Payer: COMMERCIAL

## 2025-06-09 ENCOUNTER — APPOINTMENT (OUTPATIENT)
Dept: MEDICAL GROUP | Facility: LAB | Age: 40
End: 2025-06-09
Payer: COMMERCIAL

## 2025-06-09 VITALS
OXYGEN SATURATION: 99 % | DIASTOLIC BLOOD PRESSURE: 80 MMHG | WEIGHT: 175 LBS | BODY MASS INDEX: 24.5 KG/M2 | TEMPERATURE: 98.2 F | HEART RATE: 94 BPM | HEIGHT: 71 IN | SYSTOLIC BLOOD PRESSURE: 128 MMHG

## 2025-06-09 DIAGNOSIS — J06.9 UPPER RESPIRATORY TRACT INFECTION, UNSPECIFIED TYPE: Primary | ICD-10-CM

## 2025-06-09 DIAGNOSIS — J18.9 PNEUMONIA DUE TO INFECTIOUS ORGANISM, UNSPECIFIED LATERALITY, UNSPECIFIED PART OF LUNG: ICD-10-CM

## 2025-06-09 PROCEDURE — 3079F DIAST BP 80-89 MM HG: CPT | Performed by: STUDENT IN AN ORGANIZED HEALTH CARE EDUCATION/TRAINING PROGRAM

## 2025-06-09 PROCEDURE — 3074F SYST BP LT 130 MM HG: CPT | Performed by: STUDENT IN AN ORGANIZED HEALTH CARE EDUCATION/TRAINING PROGRAM

## 2025-06-09 PROCEDURE — 99213 OFFICE O/P EST LOW 20 MIN: CPT | Performed by: STUDENT IN AN ORGANIZED HEALTH CARE EDUCATION/TRAINING PROGRAM

## 2025-06-09 RX ORDER — AZITHROMYCIN 500 MG/1
500 TABLET, FILM COATED ORAL DAILY
Qty: 3 TABLET | Refills: 0 | Status: SHIPPED | OUTPATIENT
Start: 2025-06-09

## 2025-06-09 ASSESSMENT — FIBROSIS 4 INDEX: FIB4 SCORE: 0.59

## 2025-06-09 NOTE — PROGRESS NOTES
"Verbal consent was acquired by the patient to use [employer] ambient listening note generation during this visit     Subjective:     HPI:   History of Present Illness  The patient is a 40-year-old female who presents for a sickness follow-up.    She began experiencing symptoms on 06/02/2025, which have persisted for a week. She reports chills, a sore throat since Monday, and hoarseness that started on Wednesday. Her throat condition has improved slightly. She attempted to return to work on Friday morning due to the improvement in her throat but had to leave early due to feeling unwell and developing congestion by Friday night. Over the weekend, she experienced diarrhea, chills, fatigue, night sweats, muscle aches, and a sensation of heaviness. She reports no fever or respiratory issues. She has been using NyQuil at night to aid sleep. She recalls a similar experience during her last COVID-19 infection, although it was accompanied by a fever. She also mentions occasional dark green or yellowish nasal discharge. She has been absent from work for 4 days and requires a doctor's note as per her employer's policy. She suspects overexertion from yard work and gardening may have contributed to her current state. 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 She has previously taken Augmentin without any adverse reactions.    No n/v/d, fever, chills, sob, chest pain. Chills+       Objective:     Exam:  /80 (BP Location: Left arm, Patient Position: Sitting, BP Cuff Size: Adult)   Pulse 94   Temp 36.8 °C (98.2 °F) (Temporal)   Ht 1.803 m (5' 11\")   Wt 79.4 kg (175 lb)   SpO2 99%   BMI 24.41 kg/m²  Body mass index is 24.41 kg/m².    Physical Exam  Gen: nad  HENT: ncat, EOMI  Resp: ctabl, no w/c  Cardiac: rrr, no m  GI: nt/nd  Neuro: no focal deficits, cn 2-12 intact throughout, sensation to light touch intact throughout  Psych: appropriate mood and " affect      Results      Assessment & Plan:     1. Upper respiratory tract infection, unspecified type        2. Pneumonia due to infectious organism, unspecified laterality, unspecified part of lung  amoxicillin-clavulanate (AUGMENTIN) 875-125 MG Tab    azithromycin (ZITHROMAX) 500 MG tablet          Assessment & Plan  1. Upper respiratory infection.  2. Suspected atypical pneumonia  - Symptoms have persisted for a week, including chills, sore throat, hoarseness, congestion, diarrhea, fatigue, night sweats, and myalgia.  - The absence of fever and the emergence of chills 7 days after suggest a possible bacterial infection, pneumonia.  - Prescriptions for Augmentin and azithromycin have been provided.   - A work note has been issued for the period from 06/13/2025 to 06/20/2025. If there is no improvement within a week, the antibiotic regimen will be extended or modified.        Return if symptoms worsen or fail to improve.    Please note that this dictation was created using voice recognition software. I have made every reasonable attempt to correct obvious errors, but I expect that there are errors of grammar and possibly content that I did not discover before finalizing the note.

## 2025-06-09 NOTE — LETTER
June 9, 2025    To Whom It May Concern:         This is confirmation that Amelia Love attended her scheduled appointment with Milo Singh M.D. on 6/09/25.    Please, excuse patient from work from 6/3/2025 until 6/13/2025. Ok to return to work when feeling better.          If you have any questions please do not hesitate to call me at the phone number listed below.    Sincerely,          Milo Singh M.D.  [phone]

## 2025-06-20 ENCOUNTER — APPOINTMENT (OUTPATIENT)
Facility: MEDICAL CENTER | Age: 40
End: 2025-06-20
Attending: NURSE PRACTITIONER
Payer: COMMERCIAL

## 2025-06-20 DIAGNOSIS — Z12.31 VISIT FOR SCREENING MAMMOGRAM: ICD-10-CM

## 2025-07-18 ENCOUNTER — APPOINTMENT (OUTPATIENT)
Facility: MEDICAL CENTER | Age: 40
End: 2025-07-18
Attending: NURSE PRACTITIONER
Payer: COMMERCIAL

## 2025-07-18 DIAGNOSIS — Z12.31 VISIT FOR SCREENING MAMMOGRAM: ICD-10-CM

## 2025-08-05 ENCOUNTER — PATIENT MESSAGE (OUTPATIENT)
Dept: MEDICAL GROUP | Facility: LAB | Age: 40
End: 2025-08-05
Payer: COMMERCIAL

## 2025-08-05 DIAGNOSIS — F40.243 FEAR OF FLYING: Primary | ICD-10-CM

## 2025-08-05 RX ORDER — LORAZEPAM 0.5 MG/1
0.5 TABLET ORAL
Qty: 10 TABLET | Refills: 0 | Status: SHIPPED | OUTPATIENT
Start: 2025-08-05 | End: 2025-08-15

## 2025-09-12 ENCOUNTER — APPOINTMENT (OUTPATIENT)
Facility: MEDICAL CENTER | Age: 40
End: 2025-09-12
Attending: NURSE PRACTITIONER
Payer: COMMERCIAL

## 2025-09-12 DIAGNOSIS — Z12.31 VISIT FOR SCREENING MAMMOGRAM: ICD-10-CM
